# Patient Record
Sex: FEMALE | Race: WHITE | NOT HISPANIC OR LATINO | Employment: FULL TIME | URBAN - METROPOLITAN AREA
[De-identification: names, ages, dates, MRNs, and addresses within clinical notes are randomized per-mention and may not be internally consistent; named-entity substitution may affect disease eponyms.]

---

## 2017-02-06 ENCOUNTER — OFFICE VISIT (OUTPATIENT)
Dept: MEDICAL GROUP | Facility: MEDICAL CENTER | Age: 42
End: 2017-02-06
Payer: COMMERCIAL

## 2017-02-06 VITALS
WEIGHT: 225 LBS | DIASTOLIC BLOOD PRESSURE: 88 MMHG | TEMPERATURE: 98 F | OXYGEN SATURATION: 99 % | RESPIRATION RATE: 12 BRPM | HEART RATE: 75 BPM | SYSTOLIC BLOOD PRESSURE: 122 MMHG | BODY MASS INDEX: 36.16 KG/M2 | HEIGHT: 66 IN

## 2017-02-06 DIAGNOSIS — R51.9 GENERALIZED HEADACHES: ICD-10-CM

## 2017-02-06 DIAGNOSIS — R53.83 OTHER FATIGUE: ICD-10-CM

## 2017-02-06 DIAGNOSIS — G47.09 OTHER INSOMNIA: ICD-10-CM

## 2017-02-06 DIAGNOSIS — M25.50 MULTIPLE JOINT PAIN: ICD-10-CM

## 2017-02-06 PROCEDURE — 99214 OFFICE O/P EST MOD 30 MIN: CPT | Performed by: NURSE PRACTITIONER

## 2017-02-06 RX ORDER — TEMAZEPAM 7.5 MG/1
7.5 CAPSULE ORAL NIGHTLY PRN
Qty: 15 CAP | Refills: 0 | Status: SHIPPED
Start: 2017-02-06 | End: 2017-02-24

## 2017-02-06 NOTE — MR AVS SNAPSHOT
"        Patito Briscoe   2017 8:40 AM   Office Visit   MRN: [id number]    Department:  [address]   Dept Phone:  [phone]    Description:  Female : 1975   Provider:  PITO Borges           Reason for Visit     Insomnia patient states she is having ongoing issues with insomnia       Allergies as of 2017     Allergen Noted Reactions    Doxycycline 2013   Diarrhea, Itching    Lachelle 2011   Diarrhea, Vomiting    Codeine 2011   Rash, Itching    rash    Penicillins 2011   Hives    Ok with omnicef    Sumatriptan 2015   Itching    Vicodin [Hydrocodone-Acetaminophen] 2013   Vomiting      You were diagnosed with     Other fatigue   [0921897]       Generalized headaches   [411206]       Multiple joint pain   [466871]       Other insomnia   [106118]         Vital Signs     Blood Pressure Pulse Temperature Respirations Height Weight    122/88 mmHg 75 36.7 °C (98 °F) 12 1.676 m (5' 6\") 102.059 kg (225 lb)    Body Mass Index Oxygen Saturation Smoking Status             36.33 kg/m2 99% Never Smoker          Basic Information     Date Of Birth Sex Race Ethnicity Preferred Language    1975 Female White Non- English      Problem List              ICD-10-CM Priority Class Noted - Resolved    Asthma, exercise induced J45.990 Low  3/4/2013 - Present    Gastroesophageal reflux disease without esophagitis K21.9   2016 - Present    Multiple joint pain M25.50   2016 - Present    Other fatigue R53.83   2016 - Present    Diarrhea R19.7   2016 - Present    Generalized headaches R51   2017 - Present      Health Maintenance        Date Due Completion Dates    IMM DTaP/Tdap/Td Vaccine (1 - Tdap) 7/15/1996 ---    IMM INFLUENZA (1) 2016 10/1/2014    PAP SMEAR 2019            Current Immunizations     Influenza TIV (IM) 10/1/2014    Pneumococcal Vaccine (PCV7) Historical Data 10/1/2014      Below and/or attached are " the medications your provider expects you to take. Review all of your home medications and newly ordered medications with your provider and/or pharmacist. Follow medication instructions as directed by your provider and/or pharmacist. Please keep your medication list with you and share with your provider. Update the information when medications are discontinued, doses are changed, or new medications (including over-the-counter products) are added; and carry medication information at all times in the event of emergency situations     Allergies:  DOXYCYCLINE - Diarrhea,Itching     GINGER - Diarrhea,Vomiting     CODEINE - Rash,Itching     PENICILLINS - Hives     SUMATRIPTAN - Itching     VICODIN - Vomiting               Medications  Valid as of: February 06, 2017 -  9:12 AM    Generic Name Brand Name Tablet Size Instructions for use    Albuterol Sulfate (Aero Soln) albuterol 108 (90 BASE) MCG/ACT Inhale 1-2 Puffs by mouth every four hours as needed for Shortness of Breath.        Albuterol Sulfate (Nebu Soln) PROVENTIL 2.5mg/3ml 3 mL by Nebulization route every four hours as needed for Shortness of Breath.        Cholecalciferol (Cap) Vitamin D 1000 UNIT Take 1 Cap by mouth every day.        Omeprazole (CAPSULE DELAYED RELEASE) PRILOSEC 20 MG Take 1 Cap by mouth every day.        Phentermine HCl (Tab) ADIPEX-P 37.5 MG TAKE ONE TABLET BY MOUTH IN THE MORNING BEFORE BREAKFAST        Temazepam (Cap) RESTORIL 7.5 MG Take 1 Cap by mouth at bedtime as needed for Sleep.        .                 Medicines prescribed today were sent to:     [facility] [address] [facility] (), [place] - [address]    [address] () [place] [address]    Phone: [phone] Fax: [phone]    Open 24 Hours?: No    [facility] #[id number] - [place], [place] - [address] [facility]    [address] [facility] [place] [address]    Phone: [phone] Fax: [phone]    Open 24 Hours?: No      Medication refill instructions:       If your prescription bottle  indicates you have medication refills left, it is not necessary to call your provider’s office. Please contact your pharmacy and they will refill your medication.    If your prescription bottle indicates you do not have any refills left, you may request refills at any time through one of the following ways: The online Trunk Show system (except Urgent Care), by calling your provider’s office, or by asking your pharmacy to contact your provider’s office with a refill request. Medication refills are processed only during regular business hours and may not be available until the next business day. Your provider may request additional information or to have a follow-up visit with you prior to refilling your medication.   *Please Note: Medication refills are assigned a new Rx number when refilled electronically. Your pharmacy may indicate that no refills were authorized even though a new prescription for the same medication is available at the pharmacy. Please request the medicine by name with the pharmacy before contacting your provider for a refill.        Referral     A referral request has been sent to our patient care coordination department. Please allow 3-5 business days for us to process this request and contact you either by phone or mail. If you do not hear from us by the 5th business day, please call us at [phone].           Trunk Show Access Code: Activation code not generated  Current Trunk Show Status: Active
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
## 2017-02-24 DIAGNOSIS — G47.09 OTHER INSOMNIA: ICD-10-CM

## 2017-02-24 RX ORDER — TEMAZEPAM 15 MG/1
15 CAPSULE ORAL NIGHTLY PRN
Qty: 30 CAP | Refills: 0 | Status: SHIPPED
Start: 2017-02-24 | End: 2017-04-10 | Stop reason: SDUPTHER

## 2017-02-28 DIAGNOSIS — G47.09 OTHER INSOMNIA: ICD-10-CM

## 2017-02-28 RX ORDER — PHENTERMINE HYDROCHLORIDE 37.5 MG/1
TABLET ORAL
Qty: 30 TAB | Refills: 0 | Status: SHIPPED
Start: 2017-02-28 | End: 2017-04-10 | Stop reason: SDUPTHER

## 2017-03-01 NOTE — TELEPHONE ENCOUNTER
Was the patient seen in the last year in this department? Yes  refill 2/6/17    Does patient have an active prescription for medications requested? No     Received Request Via: Pharmacy

## 2017-04-03 ENCOUNTER — HOSPITAL ENCOUNTER (EMERGENCY)
Facility: MEDICAL CENTER | Age: 42
End: 2017-04-03
Attending: EMERGENCY MEDICINE
Payer: COMMERCIAL

## 2017-04-03 ENCOUNTER — APPOINTMENT (OUTPATIENT)
Dept: RADIOLOGY | Facility: MEDICAL CENTER | Age: 42
End: 2017-04-03
Attending: EMERGENCY MEDICINE
Payer: COMMERCIAL

## 2017-04-03 VITALS
HEIGHT: 63 IN | BODY MASS INDEX: 39.65 KG/M2 | OXYGEN SATURATION: 96 % | HEART RATE: 86 BPM | DIASTOLIC BLOOD PRESSURE: 89 MMHG | RESPIRATION RATE: 18 BRPM | WEIGHT: 223.77 LBS | SYSTOLIC BLOOD PRESSURE: 142 MMHG | TEMPERATURE: 97.9 F

## 2017-04-03 DIAGNOSIS — R07.81 PLEURITIC CHEST PAIN: ICD-10-CM

## 2017-04-03 DIAGNOSIS — R07.9 CHEST PAIN, UNSPECIFIED TYPE: ICD-10-CM

## 2017-04-03 LAB
ALBUMIN SERPL BCP-MCNC: 4.8 G/DL (ref 3.2–4.9)
ALBUMIN/GLOB SERPL: 1.3 G/DL
ALP SERPL-CCNC: 63 U/L (ref 30–99)
ALT SERPL-CCNC: 19 U/L (ref 2–50)
ANION GAP SERPL CALC-SCNC: 8 MMOL/L (ref 0–11.9)
APPEARANCE UR: CLEAR
AST SERPL-CCNC: 18 U/L (ref 12–45)
BASOPHILS # BLD AUTO: 0.5 % (ref 0–1.8)
BASOPHILS # BLD: 0.03 K/UL (ref 0–0.12)
BILIRUB SERPL-MCNC: 0.5 MG/DL (ref 0.1–1.5)
BNP SERPL-MCNC: 5 PG/ML (ref 0–100)
BUN SERPL-MCNC: 18 MG/DL (ref 8–22)
CALCIUM SERPL-MCNC: 10 MG/DL (ref 8.5–10.5)
CHLORIDE SERPL-SCNC: 101 MMOL/L (ref 96–112)
CO2 SERPL-SCNC: 25 MMOL/L (ref 20–33)
COLOR UR AUTO: YELLOW
CREAT SERPL-MCNC: 0.72 MG/DL (ref 0.5–1.4)
DEPRECATED D DIMER PPP IA-ACNC: <200 NG/ML(D-DU)
EKG IMPRESSION: NORMAL
EOSINOPHIL # BLD AUTO: 0.27 K/UL (ref 0–0.51)
EOSINOPHIL NFR BLD: 4.4 % (ref 0–6.9)
ERYTHROCYTE [DISTWIDTH] IN BLOOD BY AUTOMATED COUNT: 38.9 FL (ref 35.9–50)
GFR SERPL CREATININE-BSD FRML MDRD: >60 ML/MIN/1.73 M 2
GLOBULIN SER CALC-MCNC: 3.7 G/DL (ref 1.9–3.5)
GLUCOSE SERPL-MCNC: 89 MG/DL (ref 65–99)
GLUCOSE UR QL STRIP.AUTO: NEGATIVE MG/DL
HCG UR QL: NEGATIVE
HCT VFR BLD AUTO: 47 % (ref 37–47)
HGB BLD-MCNC: 15.6 G/DL (ref 12–16)
IMM GRANULOCYTES # BLD AUTO: 0.01 K/UL (ref 0–0.11)
IMM GRANULOCYTES NFR BLD AUTO: 0.2 % (ref 0–0.9)
KETONES UR QL STRIP.AUTO: NEGATIVE MG/DL
LEUKOCYTE ESTERASE UR QL STRIP.AUTO: NEGATIVE
LIPASE SERPL-CCNC: 8 U/L (ref 11–82)
LYMPHOCYTES # BLD AUTO: 1.57 K/UL (ref 1–4.8)
LYMPHOCYTES NFR BLD: 25.7 % (ref 22–41)
MCH RBC QN AUTO: 30 PG (ref 27–33)
MCHC RBC AUTO-ENTMCNC: 33.2 G/DL (ref 33.6–35)
MCV RBC AUTO: 90.4 FL (ref 81.4–97.8)
MONOCYTES # BLD AUTO: 0.29 K/UL (ref 0–0.85)
MONOCYTES NFR BLD AUTO: 4.8 % (ref 0–13.4)
NEUTROPHILS # BLD AUTO: 3.93 K/UL (ref 2–7.15)
NEUTROPHILS NFR BLD: 64.4 % (ref 44–72)
NITRITE UR QL STRIP.AUTO: NEGATIVE
NRBC # BLD AUTO: 0 K/UL
NRBC BLD AUTO-RTO: 0 /100 WBC
PH UR STRIP.AUTO: 6 [PH]
PLATELET # BLD AUTO: 186 K/UL (ref 164–446)
PMV BLD AUTO: 11.2 FL (ref 9–12.9)
POTASSIUM SERPL-SCNC: 3.9 MMOL/L (ref 3.6–5.5)
PROT SERPL-MCNC: 8.5 G/DL (ref 6–8.2)
PROT UR QL STRIP: NEGATIVE MG/DL
RBC # BLD AUTO: 5.2 M/UL (ref 4.2–5.4)
RBC UR QL AUTO: NEGATIVE
SODIUM SERPL-SCNC: 134 MMOL/L (ref 135–145)
SP GR UR: 1.01
TROPONIN I SERPL-MCNC: <0.01 NG/ML (ref 0–0.04)
WBC # BLD AUTO: 6.1 K/UL (ref 4.8–10.8)

## 2017-04-03 PROCEDURE — 36415 COLL VENOUS BLD VENIPUNCTURE: CPT

## 2017-04-03 PROCEDURE — 96374 THER/PROPH/DIAG INJ IV PUSH: CPT

## 2017-04-03 PROCEDURE — 71020 DX-CHEST-2 VIEWS: CPT

## 2017-04-03 PROCEDURE — 84484 ASSAY OF TROPONIN QUANT: CPT

## 2017-04-03 PROCEDURE — 83880 ASSAY OF NATRIURETIC PEPTIDE: CPT

## 2017-04-03 PROCEDURE — 80053 COMPREHEN METABOLIC PANEL: CPT

## 2017-04-03 PROCEDURE — 700101 HCHG RX REV CODE 250: Performed by: EMERGENCY MEDICINE

## 2017-04-03 PROCEDURE — 93005 ELECTROCARDIOGRAM TRACING: CPT

## 2017-04-03 PROCEDURE — 99284 EMERGENCY DEPT VISIT MOD MDM: CPT

## 2017-04-03 PROCEDURE — 94640 AIRWAY INHALATION TREATMENT: CPT

## 2017-04-03 PROCEDURE — 83690 ASSAY OF LIPASE: CPT

## 2017-04-03 PROCEDURE — 85025 COMPLETE CBC W/AUTO DIFF WBC: CPT

## 2017-04-03 PROCEDURE — 81025 URINE PREGNANCY TEST: CPT

## 2017-04-03 PROCEDURE — 85379 FIBRIN DEGRADATION QUANT: CPT

## 2017-04-03 PROCEDURE — 700111 HCHG RX REV CODE 636 W/ 250 OVERRIDE (IP): Performed by: EMERGENCY MEDICINE

## 2017-04-03 PROCEDURE — 81002 URINALYSIS NONAUTO W/O SCOPE: CPT

## 2017-04-03 RX ORDER — OXYCODONE HYDROCHLORIDE AND ACETAMINOPHEN 5; 325 MG/1; MG/1
1-2 TABLET ORAL EVERY 4 HOURS PRN
Qty: 15 TAB | Refills: 0 | Status: SHIPPED | OUTPATIENT
Start: 2017-04-03 | End: 2019-07-08

## 2017-04-03 RX ORDER — IPRATROPIUM BROMIDE AND ALBUTEROL SULFATE 2.5; .5 MG/3ML; MG/3ML
3 SOLUTION RESPIRATORY (INHALATION)
Status: COMPLETED | OUTPATIENT
Start: 2017-04-03 | End: 2017-04-03

## 2017-04-03 RX ORDER — KETOROLAC TROMETHAMINE 30 MG/ML
30 INJECTION, SOLUTION INTRAMUSCULAR; INTRAVENOUS ONCE
Status: COMPLETED | OUTPATIENT
Start: 2017-04-03 | End: 2017-04-03

## 2017-04-03 RX ADMIN — IPRATROPIUM BROMIDE AND ALBUTEROL SULFATE 3 ML: .5; 3 SOLUTION RESPIRATORY (INHALATION) at 11:15

## 2017-04-03 RX ADMIN — KETOROLAC TROMETHAMINE 30 MG: 30 INJECTION, SOLUTION INTRAMUSCULAR; INTRAVENOUS at 12:22

## 2017-04-03 ASSESSMENT — COPD QUESTIONNAIRES
HAVE YOU SMOKED AT LEAST 100 CIGARETTES IN YOUR ENTIRE LIFE: YES
DURING THE PAST 4 WEEKS HOW MUCH DID YOU FEEL SHORT OF BREATH: NONE/LITTLE OF THE TIME
DO YOU EVER COUGH UP ANY MUCUS OR PHLEGM?: NO/ONLY WITH OCCASIONAL COLDS OR INFECTIONS
COPD SCREENING SCORE: 2

## 2017-04-03 ASSESSMENT — PAIN SCALES - GENERAL: PAINLEVEL_OUTOF10: 5

## 2017-04-03 ASSESSMENT — LIFESTYLE VARIABLES: EVER_SMOKED: YES

## 2017-04-03 NOTE — ED PROVIDER NOTES
ED Provider Note    CHIEF COMPLAINT  Chief Complaint   Patient presents with   • Chest Pain     Pt BIB self to triage for c/o sharp/stabbing pain for two days. pt reports pain in left chest/ radiates to back/shoulder and up neck.        HPI  Patito Briscoe is a 41 y.o. female who presents to the emergency department playing and chest pain. She states that she has left-sided chest pain and increase in severity over the last 48 hours. The pain, and suddenly when she was laughing watching a video. The pain is sharp in nature, radiates to her back and left arm, no alleviating or exacerbating factors. She's had a slight cough and shortness of breath and feels pressure in the left side of her chest wall. She denies fever, shortness of breath, hemoptysis, hematochezia, melena, hematemesis. The patient denies swelling of her lower extremities, abdominal pain, nausea, vomiting. She states that she has not had a period in many years secondary to ablation.    Cardiac Risk Factors:  No Age > 65  No Aspirin use within 7 days  No prior history of coronary artery disease  No diabetes  No hyperlipidemia   No hypertension  No obesity  No family history of coronary artery disease at a young age <54 yo  No tobacco use   No drugs (methamphetamine or cocaine)  No history of aortic aneurysm   No history of aortic dissection   No history of deep vein thrombosis or pulmonary embolism   No hormone replacement  No oral birth control      REVIEW OF SYSTEMS  Positives as above. Pertinent negatives include shortness of breath, All other review of systems are negative    PAST MEDICAL HISTORY  Past Medical History   Diagnosis Date   • Asthma, exercise induced 3/4/2013       FAMILY HISTORY  Noncontributory    SOCIAL HISTORY  Social History     Social History   • Marital Status:      Spouse Name: N/A   • Number of Children: N/A   • Years of Education: N/A     Social History Main Topics   • Smoking status: Never Smoker    • Smokeless  "tobacco: Never Used   • Alcohol Use: Yes      Comment: occasionally   • Drug Use: No   • Sexual Activity:     Partners: Male     Birth Control/ Protection: Surgical     Other Topics Concern   • Not on file     Social History Narrative       SURGICAL HISTORY  Past Surgical History   Procedure Laterality Date   • Tonsillectomy     • Clavicle orif     • Endometrial ablation       2009       CURRENT MEDICATIONS  Home Medications     **Home medications have not yet been reviewed for this encounter**          ALLERGIES  Allergies   Allergen Reactions   • Doxycycline Diarrhea and Itching   • Lachelle Diarrhea and Vomiting   • Codeine Rash and Itching     rash   • Penicillins Hives     Ok with omnicef   • Sumatriptan Itching   • Vicodin [Hydrocodone-Acetaminophen] Vomiting       PHYSICAL EXAM  VITAL SIGNS: /89 mmHg  Pulse 86  Temp(Src) 36.6 °C (97.9 °F) (Temporal)  Resp 18  Ht 1.6 m (5' 2.99\")  Wt 101.5 kg (223 lb 12.3 oz)  BMI 39.65 kg/m2  SpO2 96%     Constitutional: Well developed, Well nourished, No acute distress, Non-toxic appearance.   Eyes: PERRLA, EOMI, Conjunctiva normal, No discharge.   Cardiovascular: Normal heart rate, Normal rhythm, No murmurs, No rubs, No gallops, and intact distal pulses.   Thorax & Lungs: Slight wheezing and rhonchi left chest wall No respiratory distress,  No chest wall tenderness.   Abdomen: Bowel sounds normal, Soft, No tenderness, No guarding, No rebound, No pulsatile masses.   Skin: Warm, Dry, No erythema, No rash.   Extremities: Full range of motion, no deformity, no edema, negative Homans sign.  Neurologic: Alert & oriented x 3, No focal deficits noted, acting appropriately on exam.  Psychiatric: Affect normal for clinical presentation.    Results for orders placed or performed during the hospital encounter of 04/03/17   CBC w/ Differential   Result Value Ref Range    WBC 6.1 4.8 - 10.8 K/uL    RBC 5.20 4.20 - 5.40 M/uL    Hemoglobin 15.6 12.0 - 16.0 g/dL    Hematocrit " 47.0 37.0 - 47.0 %    MCV 90.4 81.4 - 97.8 fL    MCH 30.0 27.0 - 33.0 pg    MCHC 33.2 (L) 33.6 - 35.0 g/dL    RDW 38.9 35.9 - 50.0 fL    Platelet Count 186 164 - 446 K/uL    MPV 11.2 9.0 - 12.9 fL    Neutrophils-Polys 64.40 44.00 - 72.00 %    Lymphocytes 25.70 22.00 - 41.00 %    Monocytes 4.80 0.00 - 13.40 %    Eosinophils 4.40 0.00 - 6.90 %    Basophils 0.50 0.00 - 1.80 %    Immature Granulocytes 0.20 0.00 - 0.90 %    Nucleated RBC 0.00 /100 WBC    Neutrophils (Absolute) 3.93 2.00 - 7.15 K/uL    Lymphs (Absolute) 1.57 1.00 - 4.80 K/uL    Monos (Absolute) 0.29 0.00 - 0.85 K/uL    Eos (Absolute) 0.27 0.00 - 0.51 K/uL    Baso (Absolute) 0.03 0.00 - 0.12 K/uL    Immature Granulocytes (abs) 0.01 0.00 - 0.11 K/uL    NRBC (Absolute) 0.00 K/uL   Complete Metabolic Panel (CMP)   Result Value Ref Range    Sodium 134 (L) 135 - 145 mmol/L    Potassium 3.9 3.6 - 5.5 mmol/L    Chloride 101 96 - 112 mmol/L    Co2 25 20 - 33 mmol/L    Anion Gap 8.0 0.0 - 11.9    Glucose 89 65 - 99 mg/dL    Bun 18 8 - 22 mg/dL    Creatinine 0.72 0.50 - 1.40 mg/dL    Calcium 10.0 8.5 - 10.5 mg/dL    AST(SGOT) 18 12 - 45 U/L    ALT(SGPT) 19 2 - 50 U/L    Alkaline Phosphatase 63 30 - 99 U/L    Total Bilirubin 0.5 0.1 - 1.5 mg/dL    Albumin 4.8 3.2 - 4.9 g/dL    Total Protein 8.5 (H) 6.0 - 8.2 g/dL    Globulin 3.7 (H) 1.9 - 3.5 g/dL    A-G Ratio 1.3 g/dL   Lipase   Result Value Ref Range    Lipase 8 (L) 11 - 82 U/L   Troponin STAT   Result Value Ref Range    Troponin I <0.01 0.00 - 0.04 ng/mL   Btype Natriuretic Peptide   Result Value Ref Range    B Natriuretic Peptide 5 0 - 100 pg/mL   D-Dimer (only helpful in low pre-test probability wells critieria. Do not order if patient ruled out by PERC criteria. See Weblinks at top of Labs section)   Result Value Ref Range    D-Dimer Screen <200 <250 ng/mL(D-DU)   ESTIMATED GFR   Result Value Ref Range    GFR If African American >60 >60 mL/min/1.73 m 2    GFR If Non African American >60 >60 mL/min/1.73 m 2    POC UA   Result Value Ref Range    POC Color Yellow     POC Appearance Clear     POC Glucose Negative Negative mg/dL    POC Ketones Negative Negative mg/dL    POC Specific Gravity 1.010 1.005-1.030    POC Blood Negative Negative    POC Urine PH 6.0 5.0-8.0    POC Protein Negative Negative mg/dL    POC Nitrites Negative Negative    POC Leukocyte Esterase Negative Negative   POC URINE PREGNANCY   Result Value Ref Range    POC Urine Pregnancy Test Negative Negative   EKG (NOW)   Result Value Ref Range    Report       [facility] Emergency Dept.    Test Date:  2017  Pt Name:    VELASQUEZ DUFF              Department: ER  MRN:        [id number]                      Room:  Gender:     F                            Technician: [id number]  :        1975                   Requested By:ER TRIAGE PROTOCOL  Order #:    [id number]                    Reading MD: LANE REDDY DO    Measurements  Intervals                                Axis  Rate:       77                           P:          37  CA:         164                          QRS:        13  QRSD:       80                           T:          -8  QT:         404  QTc:        458    Interpretive Statements  SINUS RHYTHM  BORDERLINE T ABNORMALITIES, INFERIOR LEADS  No previous ECG available for comparison    Electronically Signed On 4-3-2017 10:48:41 PDT by LANE REDDY DO       EKG reviewed above  RADIOLOGY/PROCEDURES  DX-CHEST-2 VIEWS   Final Result      No acute cardiopulmonary disease.          COURSE & MEDICAL DECISION MAKING  Pertinent Labs & Imaging studies reviewed. (See chart for details)  This is a charming 41-year-old female with pleuritic chest pain. She has no cardiac risk factors, negative EKG, troponin negative his symptoms greater than 24 hours. I do not believe she is experiencing acute coronary syndrome, myocardial infarction. In addition, the patient has a negative d-dimer and I do not believe the patient  is experiencing a pulmonary embolism, she is not hypoxic, tachypneic or tachycardic. The patient probably is experiencing pleurisy. I have given her Toradol IV in the emergency department with significant improvement of symptoms. I instructed the patient to return to the emergency department if she has increasing symptomatology and to follow-up with her primary care physician.    Discharge Medication List as of 4/3/2017 12:56 PM      START taking these medications    Details   oxycodone-acetaminophen (PERCOCET) 5-325 MG Tab Take 1-2 Tabs by mouth every four hours as needed., Disp-15 Tab, R-0, Print Rx Paper             FINAL IMPRESSION     1. Chest pain, unspecified type    2. Pleuritic chest pain        I reviewed prescription monitoring program for patient's narcotic use before prescribing a scheduled drug.The patient will not drink alcohol nor drive with prescribed medications.The patient will return for new or worsening symptoms and is stable at the time of discharge.    The patient is referred to a primary physician for blood pressure management, diabetic screening, and for all other preventative health concerns.    DISPOSITION:  Patient will be discharged home in stable condition.    FOLLOW UP:  [facility], Emergency Dept  [address] [facility] [address] [phone]    If symptoms worsen    Jessica Schroeder, A.P.N.  [address] [address] [address] [phone]    Schedule an appointment as soon as possible for a visit in 1 week        OUTPATIENT MEDICATIONS:  Discharge Medication List as of 4/3/2017 12:56 PM      START taking these medications    Details   oxycodone-acetaminophen (PERCOCET) 5-325 MG Tab Take 1-2 Tabs by mouth every four hours as needed., Disp-15 Tab, R-0, Print Rx Paper                    Electronically signed by: Leonardo Toth, 4/3/2017 11:24 AM

## 2017-04-03 NOTE — ED AVS SNAPSHOT
K121 Access Code: Activation code not generated  Current K121 Status: Active    [employer]  A secure, online tool to manage your health information     [employer]’s K121® is a secure, online tool that connects you to your personalized health information from the privacy of your home -- day or night - making it very easy for you to manage your healthcare. Once the activation process is completed, you can even access your medical information using the K121 rolly, which is available for free in the Apple Rolly store or Google Play store.     K121 provides the following levels of access (as shown below):   My Chart Features   [facility] Primary Care Doctor [facility]  Specialists [facility]  Urgent  Care Non-[facility]  Primary Care  Doctor   Email your healthcare team securely and privately 24/7 X X X X   Manage appointments: schedule your next appointment; view details of past/upcoming appointments X      Request prescription refills. X      View recent personal medical records, including lab and immunizations X X X X   View health record, including health history, allergies, medications X X X X   Read reports about your outpatient visits, procedures, consult and ER notes X X X X   See your discharge summary, which is a recap of your hospital and/or ER visit that includes your diagnosis, lab results, and care plan. X X       How to register for K121:  1. Go to  [email or web address].  2. Click on the Sign Up Now box, which takes you to the New Member Sign Up page. You will need to provide the following information:  a. Enter your K121 Access Code exactly as it appears at the top of this page. (You will not need to use this code after you’ve completed the sign-up process. If you do not sign up before the expiration date, you must request a new code.)   b. Enter your date of birth.   c. Enter your home email address.   d. Click Submit, and follow the next screen’s instructions.  3. Create a K121 ID. This will  be your [employer] login ID and cannot be changed, so think of one that is secure and easy to remember.  4. Create a [employer] password. You can change your password at any time.  5. Enter your Password Reset Question and Answer. This can be used at a later time if you forget your password.   6. Enter your e-mail address. This allows you to receive e-mail notifications when new information is available in [employer].  7. Click Sign Up. You can now view your health information.    For assistance activating your [employer] account, call [phone]

## 2017-04-03 NOTE — ED NOTES
Chief Complaint   Patient presents with   • Chest Pain     Pt BIB self to triage for c/o sharp/stabbing pain for two days. pt reports pain in left chest/ radiates to back/shoulder and up neck.      Explained to pt triage process, made pt aware to tell this RN of any changes/concerns, pt verbalized understanding of process and instructions given. Pt to ER lobby.

## 2017-04-03 NOTE — DISCHARGE INSTRUCTIONS
Chest Wall Pain  Chest wall pain is pain in or around the bones and muscles of your chest. It may take up to 6 weeks to get better. It may take longer if you must stay physically active in your work and activities.   CAUSES   Chest wall pain may happen on its own. However, it may be caused by:  · A viral illness like the flu.  · Injury.  · Coughing.  · Exercise.  · Arthritis.  · Fibromyalgia.  · Shingles.  HOME CARE INSTRUCTIONS   · Avoid overtiring physical activity. Try not to strain or perform activities that cause pain. This includes any activities using your chest or your abdominal and side muscles, especially if heavy weights are used.  · Put ice on the sore area.  · Put ice in a plastic bag.  · Place a towel between your skin and the bag.  · Leave the ice on for 15-20 minutes per hour while awake for the first 2 days.  · Only take over-the-counter or prescription medicines for pain, discomfort, or fever as directed by your caregiver.  SEEK IMMEDIATE MEDICAL CARE IF:   · Your pain increases, or you are very uncomfortable.  · You have a fever.  · Your chest pain becomes worse.  · You have new, unexplained symptoms.  · You have nausea or vomiting.  · You feel sweaty or lightheaded.  · You have a cough with phlegm (sputum), or you cough up blood.  MAKE SURE YOU:   · Understand these instructions.  · Will watch your condition.  · Will get help right away if you are not doing well or get worse.     This information is not intended to replace advice given to you by your health care provider. Make sure you discuss any questions you have with your health care provider.     Document Released: 12/18/2006 Document Revised: 03/11/2013 Document Reviewed: 03/14/2016  [employer] Interactive Patient Education ©2016 [employer]  Pleurisy  Pleurisy is an inflammation and swelling of the lining of the lungs (pleura). Because of this inflammation, it hurts to breathe. It can be aggravated by coughing, laughing, or deep breathing.  Pleurisy is often caused by an underlying infection or disease.   HOME CARE INSTRUCTIONS   Monitor your pleurisy for any changes. The following actions may help to alleviate any discomfort you are experiencing:  · Medicine may help with pain. Only take over-the-counter or prescription medicines for pain, discomfort, or fever as directed by your health care provider.  · Only take antibiotic medicine as directed. Make sure to finish it even if you start to feel better.  SEEK MEDICAL CARE IF:   · Your pain is not controlled with medicine or is increasing.  · You have an increase in pus-like (purulent) secretions brought up with coughing.  SEEK IMMEDIATE MEDICAL CARE IF:   · You have blue or dark lips, fingernails, or toenails.  · You are coughing up blood.  · You have increased difficulty breathing.  · You have continuing pain unrelieved by medicine or pain lasting more than 1 week.  · You have pain that radiates into your neck, arms, or jaw.  · You develop increased shortness of breath or wheezing.  · You develop a fever, rash, vomiting, fainting, or other serious symptoms.  MAKE SURE YOU:  · Understand these instructions.    · Will watch your condition.    · Will get help right away if you are not doing well or get worse.        This information is not intended to replace advice given to you by your health care provider. Make sure you discuss any questions you have with your health care provider.     Document Released: 12/18/2006 Document Revised: 08/20/2014 Document Reviewed: 06/01/2014  [employer] Interactive Patient Education ©2016 [employer]

## 2017-04-03 NOTE — ED AVS SNAPSHOT
Home Care Instructions                                                                                                                Patito Briscoe   MRN: [id number]    Department:  [facility], Emergency Dept   Date of Visit:  4/3/2017            [facility] Emergency Dept    [address] [place] [address]    Phone:  [phone]      You were seen by     Leonardo Toth D.O.      Your Diagnosis Was     Chest pain, unspecified type     R07.9       These are the medications you received during your hospitalization from 04/03/2017 0937 to 04/03/2017 1256     Date/Time Order Dose Route Action    04/03/2017 1115 ipratropium-albuterol (DUONEB) nebulizer solution 3 mL 3 mL Nebulization Given    04/03/2017 1222 ketorolac (TORADOL) injection 30 mg 30 mg Intravenous Given      Follow-up Information     1. Follow up with [facility] Emergency [facility].    Specialty:  Emergency Medicine    Why:  If symptoms worsen    Contact information    [address] [place] [id number] [phone]        2. Follow up with [facility]. Schedule an appointment as soon as possible for a visit in 1 week.    Specialty:  Family Medicine    Contact information    [address] [facility] [address]  [facility] [address] [phone]        Medication Information     Review all of your home medications and newly ordered medications with your primary doctor and/or pharmacist as soon as possible. Follow medication instructions as directed by your doctor and/or pharmacist.     Please keep your complete medication list with you and share with your physician. Update the information when medications are discontinued, doses are changed, or new medications (including over-the-counter products) are added; and carry medication information at all times in the event of emergency situations.               Medication List      START taking these medications        Instructions    Morning Afternoon Evening Bedtime    oxycodone-acetaminophen 5-325 MG Tabs   Commonly known as:  PERCOCET        Take 1-2 Tabs by mouth every four hours as needed.   Dose:  1-2 Tab                          ASK your doctor about these medications        Instructions    Morning Afternoon Evening Bedtime    * albuterol 108 (90 BASE) MCG/ACT Aers inhalation aerosol        Doctor's comments:  May fill as cheapest albuterol possible.   Inhale 1-2 Puffs by mouth every four hours as needed for Shortness of Breath.   Dose:  1-2 Puff                        * albuterol 2.5mg/3ml Nebu solution for nebulization   Commonly known as:  PROVENTIL        3 mL by Nebulization route every four hours as needed for Shortness of Breath.   Dose:  2.5 mg                        omeprazole 20 MG delayed-release capsule   Commonly known as:  PRILOSEC        Take 1 Cap by mouth every day.   Dose:  20 mg                        phentermine 37.5 MG tablet   Commonly known as:  ADIPEX-P        TAKE ONE TABLET BY MOUTH ONCE DAILY IN THE MORNING BEFORE  BREAKFAST                        temazepam 15 MG Caps   Commonly known as:  RESTORIL        Take 1 Cap by mouth at bedtime as needed for Sleep.   Dose:  15 mg                        Vitamin D 1000 UNIT Caps        Take 1 Cap by mouth every day.   Dose:  1 Cap                        * Notice:  This list has 2 medication(s) that are the same as other medications prescribed for you. Read the directions carefully, and ask your doctor or other care provider to review them with you.         Where to Get Your Medications      You can get these medications from any pharmacy     Bring a paper prescription for each of these medications    - oxycodone-acetaminophen 5-325 MG Tabs            Procedures and tests performed during your visit     Btype Natriuretic Peptide    CBC w/ Differential    Complete Metabolic Panel (CMP)    D-Dimer (only helpful in low pre-test probability wells critieria. Do  not order if patient ruled out by PERC criteria. See Weblinks at top of Labs section)    DX-CHEST-2 VIEWS    EKG (NOW)    ESTIMATED GFR    IV Saline Lock    Lipase    OLD EKG    POC UA    POC URINE PREGNANCY    Troponin STAT        Discharge Instructions       Chest Wall Pain  Chest wall pain is pain in or around the bones and muscles of your chest. It may take up to 6 weeks to get better. It may take longer if you must stay physically active in your work and activities.   CAUSES   Chest wall pain may happen on its own. However, it may be caused by:  · A viral illness like the flu.  · Injury.  · Coughing.  · Exercise.  · Arthritis.  · Fibromyalgia.  · Shingles.  HOME CARE INSTRUCTIONS   · Avoid overtiring physical activity. Try not to strain or perform activities that cause pain. This includes any activities using your chest or your abdominal and side muscles, especially if heavy weights are used.  · Put ice on the sore area.  · Put ice in a plastic bag.  · Place a towel between your skin and the bag.  · Leave the ice on for 15-20 minutes per hour while awake for the first 2 days.  · Only take over-the-counter or prescription medicines for pain, discomfort, or fever as directed by your caregiver.  SEEK IMMEDIATE MEDICAL CARE IF:   · Your pain increases, or you are very uncomfortable.  · You have a fever.  · Your chest pain becomes worse.  · You have new, unexplained symptoms.  · You have nausea or vomiting.  · You feel sweaty or lightheaded.  · You have a cough with phlegm (sputum), or you cough up blood.  MAKE SURE YOU:   · Understand these instructions.  · Will watch your condition.  · Will get help right away if you are not doing well or get worse.     This information is not intended to replace advice given to you by your health care provider. Make sure you discuss any questions you have with your health care provider.     Document Released: 12/18/2006 Document Revised: 03/11/2013 Document Reviewed:  03/14/2016  [employer] Interactive Patient Education ©2016 [employer].  Pleurisy  Pleurisy is an inflammation and swelling of the lining of the lungs (pleura). Because of this inflammation, it hurts to breathe. It can be aggravated by coughing, laughing, or deep breathing. Pleurisy is often caused by an underlying infection or disease.   HOME CARE INSTRUCTIONS   Monitor your pleurisy for any changes. The following actions may help to alleviate any discomfort you are experiencing:  · Medicine may help with pain. Only take over-the-counter or prescription medicines for pain, discomfort, or fever as directed by your health care provider.  · Only take antibiotic medicine as directed. Make sure to finish it even if you start to feel better.  SEEK MEDICAL CARE IF:   · Your pain is not controlled with medicine or is increasing.  · You have an increase in pus-like (purulent) secretions brought up with coughing.  SEEK IMMEDIATE MEDICAL CARE IF:   · You have blue or dark lips, fingernails, or toenails.  · You are coughing up blood.  · You have increased difficulty breathing.  · You have continuing pain unrelieved by medicine or pain lasting more than 1 week.  · You have pain that radiates into your neck, arms, or jaw.  · You develop increased shortness of breath or wheezing.  · You develop a fever, rash, vomiting, fainting, or other serious symptoms.  MAKE SURE YOU:  · Understand these instructions.    · Will watch your condition.    · Will get help right away if you are not doing well or get worse.        This information is not intended to replace advice given to you by your health care provider. Make sure you discuss any questions you have with your health care provider.     Document Released: 12/18/2006 Document Revised: 08/20/2014 Document Reviewed: 06/01/2014  [employer] Interactive Patient Education ©2016 [employer].            Patient Information     Patient Information    Following emergency treatment: all patient  requiring follow-up care must return either to a private physician or a clinic if your condition worsens before you are able to obtain further medical attention, please return to the emergency room.     Billing Information    At [facility], we work to make the billing process streamlined for our patients.  Our Representatives are here to answer any questions you may have regarding your hospital bill.  If you have insurance coverage and have supplied your insurance information to us, we will submit a claim to your insurer on your behalf.  Should you have any questions regarding your bill, we can be reached online or by phone as follows:  Online: You are able pay your bills online or live chat with our representatives about any billing questions you may have. We are here to help Monday - Friday from 8:00am to 7:30pm and 9:00am - 12:00pm on Saturdays.  Please visit [email or web address]  for more information.   Phone:  [phone] or [phone]    Please note that your emergency physician, surgeon, pathologist, radiologist, anesthesiologist, and other specialists are not employed by [facility] and will therefore bill separately for their services.  Please contact them directly for any questions concerning their bills at the numbers below:     Emergency Physician Services:  [phone]  [facility]:  [phone]  [facility]:  [phone]  [facility]:  [phone]    1. Your final bill may vary from the amount quoted upon discharge if all procedures are not complete at that time, or if your doctor has additional procedures of which we are not aware. You will receive an additional bill if you return to the Emergency Department at [facility] for suture removal regardless of the facility of which the sutures were placed.     2. Please arrange for settlement of this account at the emergency registration.    3. All self-pay accounts  are due in full at the time of treatment.  If you are unable to meet this obligation then payment is expected within 4-5 days.     4. If you have had radiology studies (CT, X-ray, Ultrasound, MRI), you have received a preliminary result during your emergency department visit. Please contact the radiology department [phone] to receive a copy of your final result. Please discuss the Final result with your primary physician or with the follow up physician provided.     Crisis Hotline:  [place] Crisis Hotline:  0-740-HJYGMHQ or 1-849.348.2262  [place] Crisis Hotline:    1-840.603.8719 or 488-620-2303         ED Discharge Follow Up Questions    1. In order to provide you with very good care, we would like to follow up with a phone call in the next few days.  May we have your permission to contact you?     YES /  NO    2. What is the best phone number to call you? (       )_____-__________    3. What is the best time to call you?      Morning  /  Afternoon  /  Evening                   Patient Signature:  ____________________________________________________________    Date:  ____________________________________________________________

## 2017-04-03 NOTE — ED NOTES
Pt given discharge instructions and Rx x 1. Pt verbalized understanding. VSS no acute distress noted, pt able to ambulate without difficulty. Pt home with

## 2017-04-03 NOTE — FLOWSHEET NOTE
04/03/17 1118   Interdisciplinary Plan of Care-Goals (Indications)   Obstructive Ventilatory Defect or Pulmonary Disease without Obvious Obstruction History / Diagnosis   Interdisciplinary Plan of Care-Outcomes    Bronchodilator Outcome Patient at Stable Baseline   Education   Education Yes - Pt. / Family has been Instructed in use of Respiratory Medications and Adverse Reactions   RT Assessment of Delivered Medications   Evaluation of Medication Delivery Daily Yes-- Pt /Family has been Instructed in use of Respiratory Medications and Adverse Reactions   SVN Group   #SVN Performed Yes   Given By: Mouthpiece   Date SVN Last Changed 04/03/17   Date SVN Next Change Due (Q 7 Days) 04/10/17   Respiratory WDL   Respiratory (WDL) X   Chest Exam   Respiration 18   Pulse 79   Heart Rate (Monitored) 79   Breath Sounds   Pre/Post Intervention Pre Intervention Assessment   RUL Breath Sounds Clear   RML Breath Sounds Clear   RLL Breath Sounds Diminished   LANNY Breath Sounds Clear   LLL Breath Sounds Diminished   Oximetry   Continuous Oximetry Yes   O2 Alarms Set & Reviewed Yes   Oxygen   Home O2 Use Prior To Admission? No   Pulse Oximetry 100 %   O2 (LPM) 0   O2 (FiO2) 21   O2 Daily Delivery Respiratory  Room Air with O2 Available

## 2017-04-03 NOTE — ED AVS SNAPSHOT
4/3/2017          Patito Briscoe  [address]   [place] [address]    Dear Patito:    [facility] wants to ensure your discharge home is safe and you or your loved ones have had all your questions answered regarding your care after you leave the hospital.    You may receive a telephone call within two days of your discharge.  This call is to make certain you understand your discharge instructions as well as ensure we provided you with the best care possible during your stay with us.     The call will only last approximately 3-5 minutes and will be done by a nurse.    Once again, we want to ensure your discharge home is safe and that you have a clear understanding of any next steps in your care.  If you have any questions or concerns, please do not hesitate to contact us, we are here for you.  Thank you for choosing [facility] for your healthcare needs.    Sincerely,    Shravan Gomes    [facility]

## 2017-04-11 DIAGNOSIS — G47.09 OTHER INSOMNIA: ICD-10-CM

## 2017-04-11 RX ORDER — TEMAZEPAM 15 MG/1
15 CAPSULE ORAL NIGHTLY PRN
Qty: 30 CAP | Refills: 0 | Status: SHIPPED
Start: 2017-04-11 | End: 2019-12-24

## 2017-04-11 RX ORDER — PHENTERMINE HYDROCHLORIDE 37.5 MG/1
TABLET ORAL
Qty: 30 TAB | Refills: 0 | Status: SHIPPED
Start: 2017-04-11 | End: 2017-07-10 | Stop reason: SDUPTHER

## 2017-04-17 RX ORDER — TEMAZEPAM 15 MG/1
CAPSULE ORAL
Qty: 30 CAP | Refills: 0 | Status: SHIPPED
Start: 2017-04-17 | End: 2017-05-22 | Stop reason: SDUPTHER

## 2017-04-17 NOTE — TELEPHONE ENCOUNTER
Prescription faxed to:    [id number]  [place] (), [place] - [address]  [address] [place] [address]  [place] () [place] [address]  Phone: [phone] Fax: [phone]

## 2017-04-17 NOTE — TELEPHONE ENCOUNTER
Was the patient seen in the last year in this department? Yes     Does patient have an active prescription for medications requested? No     Received Request Via: Pharmacy     Per  last fill: 3-10-17 # 30

## 2017-05-22 RX ORDER — PHENTERMINE HYDROCHLORIDE 37.5 MG/1
TABLET ORAL
Qty: 30 TAB | Refills: 0 | Status: SHIPPED
Start: 2017-05-22 | End: 2018-05-21 | Stop reason: SDUPTHER

## 2017-05-22 RX ORDER — TEMAZEPAM 15 MG/1
15 CAPSULE ORAL NIGHTLY PRN
Qty: 30 CAP | Refills: 0 | Status: SHIPPED
Start: 2017-05-22 | End: 2019-07-08 | Stop reason: SDUPTHER

## 2017-05-22 RX ORDER — OMEPRAZOLE 20 MG/1
CAPSULE, DELAYED RELEASE ORAL
Qty: 30 CAP | Refills: 0 | Status: SHIPPED | OUTPATIENT
Start: 2017-05-22 | End: 2017-07-03 | Stop reason: SDUPTHER

## 2017-05-22 NOTE — TELEPHONE ENCOUNTER
Was the patient seen in the last year in this department? Yes     Does patient have an active prescription for medications requested? No     Received Request Via: Pharmacy     Per  last fill:   Restoril: 4-17-17 # 30  Phentermine: 4-13-17 # 30

## 2017-05-22 NOTE — TELEPHONE ENCOUNTER
Prescription faxed to:    [id number]  [place] (), [place] - [address]  [address] [place] [address]  [place] () [place] [address]  Phone: [phone] Fax: [phone]

## 2017-07-03 RX ORDER — OMEPRAZOLE 20 MG/1
CAPSULE, DELAYED RELEASE ORAL
Qty: 30 CAP | Refills: 0 | Status: SHIPPED | OUTPATIENT
Start: 2017-07-03

## 2017-07-10 RX ORDER — PHENTERMINE HYDROCHLORIDE 37.5 MG/1
TABLET ORAL
Qty: 30 TAB | Refills: 0 | Status: SHIPPED
Start: 2017-07-10 | End: 2017-12-18 | Stop reason: SDUPTHER

## 2017-07-10 NOTE — TELEPHONE ENCOUNTER
Was the patient seen in the last year in this department? Yes     Does patient have an active prescription for medications requested? No     Received Request Via: Pharmacy     Last visit:2/6/17    Last  fill:5/22/17

## 2018-01-15 ENCOUNTER — APPOINTMENT (OUTPATIENT)
Dept: MEDICAL GROUP | Facility: LAB | Age: 43
End: 2018-01-15

## 2018-02-21 RX ORDER — PHENTERMINE HYDROCHLORIDE 37.5 MG/1
TABLET ORAL
Qty: 30 TAB | Refills: 0
Start: 2018-02-21 | End: 2018-03-23

## 2018-02-21 NOTE — TELEPHONE ENCOUNTER
Was the patient seen in the last year in this department? No LOV: 2-6-17    Does patient have an active prescription for medications requested? No     Received Request Via: Pharmacy     Per  last fill: 12-19-17 # 30

## 2018-05-21 DIAGNOSIS — E66.9 CLASS 2 OBESITY WITHOUT SERIOUS COMORBIDITY IN ADULT, UNSPECIFIED BMI, UNSPECIFIED OBESITY TYPE: ICD-10-CM

## 2018-05-21 PROBLEM — E66.812 CLASS 2 OBESITY WITHOUT SERIOUS COMORBIDITY IN ADULT: Status: ACTIVE | Noted: 2018-05-21

## 2018-05-21 RX ORDER — PHENTERMINE HYDROCHLORIDE 37.5 MG/1
TABLET ORAL
Qty: 30 TAB | Refills: 2 | Status: SHIPPED
Start: 2018-05-21 | End: 2018-06-18

## 2018-07-02 ENCOUNTER — OFFICE VISIT (OUTPATIENT)
Dept: URGENT CARE | Facility: PHYSICIAN GROUP | Age: 43
End: 2018-07-02

## 2018-07-02 VITALS
DIASTOLIC BLOOD PRESSURE: 78 MMHG | RESPIRATION RATE: 18 BRPM | SYSTOLIC BLOOD PRESSURE: 122 MMHG | BODY MASS INDEX: 31.89 KG/M2 | WEIGHT: 180 LBS | TEMPERATURE: 98.7 F | HEART RATE: 74 BPM | OXYGEN SATURATION: 97 % | HEIGHT: 63 IN

## 2018-07-02 DIAGNOSIS — J45.21 MILD INTERMITTENT ASTHMA WITH EXACERBATION: ICD-10-CM

## 2018-07-02 DIAGNOSIS — J06.9 ACUTE URI: ICD-10-CM

## 2018-07-02 PROCEDURE — 99214 OFFICE O/P EST MOD 30 MIN: CPT | Performed by: PHYSICIAN ASSISTANT

## 2018-07-02 RX ORDER — PREDNISONE 20 MG/1
TABLET ORAL
Qty: 10 TAB | Refills: 0 | Status: SHIPPED | OUTPATIENT
Start: 2018-07-02 | End: 2019-07-08

## 2018-07-02 RX ORDER — METHYLPREDNISOLONE 4 MG/1
TABLET ORAL
Qty: 1 KIT | Refills: 0 | Status: SHIPPED | OUTPATIENT
Start: 2018-07-02 | End: 2019-07-08

## 2018-07-03 ASSESSMENT — ENCOUNTER SYMPTOMS
SHORTNESS OF BREATH: 0
SPUTUM PRODUCTION: 0
WHEEZING: 1
HEMOPTYSIS: 0
FEVER: 0
CHEST TIGHTNESS: 1
SORE THROAT: 0
CHILLS: 0
COUGH: 1
PALPITATIONS: 0

## 2018-07-03 NOTE — PROGRESS NOTES
Subjective:      Patito Briscoe is a 42 y.o. female who presents with Cough (congestion, sinus x 2 day)            Asthma   She complains of chest tightness, cough and wheezing. There is no hemoptysis, shortness of breath or sputum production. This is a new problem. The current episode started in the past 7 days. The problem occurs constantly. The problem has been unchanged. The cough is non-productive. Pertinent negatives include no chest pain, ear pain, fever, malaise/fatigue or sore throat. Her symptoms are aggravated by lying down. Her symptoms are not alleviated by beta-agonist. There are no known risk factors for lung disease. Her past medical history is significant for asthma.       Review of Systems   Constitutional: Negative for chills, fever and malaise/fatigue.   HENT: Negative for congestion, ear pain and sore throat.    Respiratory: Positive for cough and wheezing. Negative for hemoptysis, sputum production and shortness of breath.    Cardiovascular: Negative for chest pain and palpitations.   All other systems reviewed and are negative.    PMH:  has a past medical history of Asthma, exercise induced (3/4/2013). She also has no past medical history of Diabetes.  MEDS:   Current Outpatient Prescriptions:   •  Albuterol Sulfate 108 (90 Base) MCG/ACT AEROSOL POWDER, BREATH ACTIVATED, Inhale 1 Inhalation by mouth 4 times a day as needed., Disp: 1 Each, Rfl: 3  •  MethylPREDNISolone (MEDROL DOSEPAK) 4 MG Tablet Therapy Pack, Take as directed on package., Disp: 1 Kit, Rfl: 0  •  omeprazole (PRILOSEC) 20 MG delayed-release capsule, TAKE ONE CAPSULE BY MOUTH ONCE DAILY, Disp: 30 Cap, Rfl: 0  •  albuterol (PROVENTIL) 2.5mg/3ml Nebu Soln solution for nebulization, 3 mL by Nebulization route every four hours as needed for Shortness of Breath., Disp: 100 mL, Rfl: 3  •  albuterol 108 (90 BASE) MCG/ACT Aero Soln inhalation aerosol, Inhale 1-2 Puffs by mouth every four hours as needed for Shortness of Breath.,  "Disp: 1 Inhaler, Rfl: 3  •  Cholecalciferol (VITAMIN D) 1000 UNIT Cap, Take 1 Cap by mouth every day., Disp: 30 Cap, Rfl: 11  •  predniSONE (DELTASONE) 20 MG Tab, Take two daily for 5 days, Disp: 10 Tab, Rfl: 0  •  temazepam (RESTORIL) 15 MG Cap, Take 1 Cap by mouth at bedtime as needed for Sleep. TAKE ONE CAPSULE BY MOUTH AT BEDTIME AS NEEDED FOR SLEEP, Disp: 30 Cap, Rfl: 0  •  temazepam (RESTORIL) 15 MG Cap, Take 1 Cap by mouth at bedtime as needed for Sleep., Disp: 30 Cap, Rfl: 0  •  oxycodone-acetaminophen (PERCOCET) 5-325 MG Tab, Take 1-2 Tabs by mouth every four hours as needed., Disp: 15 Tab, Rfl: 0  ALLERGIES:   Allergies   Allergen Reactions   • Doxycycline Diarrhea and Itching   • Ginger Diarrhea and Vomiting   • Codeine Rash and Itching     rash   • Penicillins Hives     Ok with omnicef   • Sumatriptan Itching   • Vicodin [Hydrocodone-Acetaminophen] Vomiting     SURGHX:   Past Surgical History:   Procedure Laterality Date   • CLAVICLE ORIF     • ENDOMETRIAL ABLATION      2009   • TONSILLECTOMY       SOCHX:  reports that she has never smoked. She has never used smokeless tobacco. She reports that she drinks alcohol. She reports that she does not use drugs.  FH: Family history was reviewed, no pertinent findings to report  Medications, Allergies, and current problem list reviewed today in Epic       Objective:     /78   Pulse 74   Temp 37.1 °C (98.7 °F)   Resp 18   Ht 1.6 m (5' 2.99\")   Wt 81.6 kg (180 lb)   SpO2 97%   BMI 31.90 kg/m²      Physical Exam   Constitutional: She is oriented to person, place, and time. She appears well-developed and well-nourished. She is active.  Non-toxic appearance. She does not have a sickly appearance. She does not appear ill. No distress. She is not intubated.   HENT:   Head: Normocephalic and atraumatic.   Right Ear: Hearing, tympanic membrane, external ear and ear canal normal.   Left Ear: Hearing, tympanic membrane, external ear and ear canal normal. "   Nose: Nose normal.   Mouth/Throat: Uvula is midline, oropharynx is clear and moist and mucous membranes are normal.   Eyes: Conjunctivae, EOM and lids are normal.   Neck: Normal range of motion and full passive range of motion without pain. Neck supple.   Cardiovascular: Normal rate, regular rhythm, S1 normal, S2 normal and normal heart sounds.  Exam reveals no gallop and no friction rub.    No murmur heard.  Pulmonary/Chest: Effort normal. No accessory muscle usage. No apnea, no tachypnea and no bradypnea. She is not intubated. No respiratory distress. She has no decreased breath sounds. She has wheezes. She has no rhonchi. She has no rales. She exhibits no tenderness.   Musculoskeletal: Normal range of motion.   Neurological: She is alert and oriented to person, place, and time.   Skin: Skin is warm and dry.   Psychiatric: She has a normal mood and affect. Her speech is normal and behavior is normal. Judgment and thought content normal.   Vitals reviewed.              Assessment/Plan:     1. Mild intermittent asthma with exacerbation    - Albuterol Sulfate 108 (90 Base) MCG/ACT AEROSOL POWDER, BREATH ACTIVATED; Inhale 1 Inhalation by mouth 4 times a day as needed.  Dispense: 1 Each; Refill: 3  - MethylPREDNISolone (MEDROL DOSEPAK) 4 MG Tablet Therapy Pack; Take as directed on package.  Dispense: 1 Kit; Refill: 0    Differential diagnosis, natural history, supportive care discussed. Follow-up with primary care provider within 7-10 days, emergency room precautions discussed.  Patient and/or family appears understanding of information.  Handout and review of patients diagnosis and treatment was discussed extensively.

## 2019-07-08 ENCOUNTER — OFFICE VISIT (OUTPATIENT)
Dept: MEDICAL GROUP | Facility: LAB | Age: 44
End: 2019-07-08

## 2019-07-08 VITALS
OXYGEN SATURATION: 95 % | TEMPERATURE: 97.9 F | BODY MASS INDEX: 35.97 KG/M2 | HEART RATE: 72 BPM | SYSTOLIC BLOOD PRESSURE: 98 MMHG | WEIGHT: 203 LBS | DIASTOLIC BLOOD PRESSURE: 78 MMHG | RESPIRATION RATE: 14 BRPM | HEIGHT: 63 IN

## 2019-07-08 DIAGNOSIS — Z12.31 ENCOUNTER FOR SCREENING MAMMOGRAM FOR BREAST CANCER: ICD-10-CM

## 2019-07-08 DIAGNOSIS — G47.9 SLEEP DISTURBANCE: ICD-10-CM

## 2019-07-08 PROCEDURE — 99213 OFFICE O/P EST LOW 20 MIN: CPT | Performed by: NURSE PRACTITIONER

## 2019-07-08 RX ORDER — PHENTERMINE HYDROCHLORIDE 37.5 MG/1
TABLET ORAL
Qty: 30 TAB | Refills: 3 | Status: SHIPPED
Start: 2019-07-08 | End: 2019-08-05

## 2019-07-08 RX ORDER — TEMAZEPAM 15 MG/1
15 CAPSULE ORAL NIGHTLY PRN
Qty: 30 CAP | Refills: 2 | Status: SHIPPED
Start: 2019-07-08 | End: 2019-08-07

## 2019-07-08 ASSESSMENT — PATIENT HEALTH QUESTIONNAIRE - PHQ9: CLINICAL INTERPRETATION OF PHQ2 SCORE: 0
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
## 2019-12-04 DIAGNOSIS — R23.2 HOT FLASHES: ICD-10-CM

## 2020-01-29 ENCOUNTER — HOSPITAL ENCOUNTER (OUTPATIENT)
Dept: LAB | Facility: MEDICAL CENTER | Age: 45
End: 2020-01-29
Attending: NURSE PRACTITIONER
Payer: COMMERCIAL

## 2020-01-29 DIAGNOSIS — R23.2 HOT FLASHES: ICD-10-CM

## 2020-01-29 LAB
ERYTHROCYTE [DISTWIDTH] IN BLOOD BY AUTOMATED COUNT: 39.7 FL (ref 35.9–50)
HCT VFR BLD AUTO: 45.7 % (ref 37–47)
HGB BLD-MCNC: 15 G/DL (ref 12–16)
MCH RBC QN AUTO: 30.8 PG (ref 27–33)
MCHC RBC AUTO-ENTMCNC: 32.8 G/DL (ref 33.6–35)
MCV RBC AUTO: 93.8 FL (ref 81.4–97.8)
PLATELET # BLD AUTO: 215 K/UL (ref 164–446)
PMV BLD AUTO: 12 FL (ref 9–12.9)
PROGEST SERPL-MCNC: <0.08 NG/ML
RBC # BLD AUTO: 4.87 M/UL (ref 4.2–5.4)
WBC # BLD AUTO: 7.2 K/UL (ref 4.8–10.8)

## 2020-01-29 PROCEDURE — 36415 COLL VENOUS BLD VENIPUNCTURE: CPT

## 2020-01-29 PROCEDURE — 82670 ASSAY OF TOTAL ESTRADIOL: CPT

## 2020-01-29 PROCEDURE — 83001 ASSAY OF GONADOTROPIN (FSH): CPT

## 2020-01-29 PROCEDURE — 84144 ASSAY OF PROGESTERONE: CPT

## 2020-01-29 PROCEDURE — 85027 COMPLETE CBC AUTOMATED: CPT

## 2020-01-29 PROCEDURE — 84443 ASSAY THYROID STIM HORMONE: CPT

## 2020-01-30 ENCOUNTER — OFFICE VISIT (OUTPATIENT)
Dept: MEDICAL GROUP | Facility: LAB | Age: 45
End: 2020-01-30
Payer: COMMERCIAL

## 2020-01-30 VITALS
WEIGHT: 211 LBS | OXYGEN SATURATION: 98 % | HEART RATE: 90 BPM | RESPIRATION RATE: 12 BRPM | SYSTOLIC BLOOD PRESSURE: 128 MMHG | HEIGHT: 63 IN | DIASTOLIC BLOOD PRESSURE: 84 MMHG | TEMPERATURE: 97.7 F | BODY MASS INDEX: 37.39 KG/M2

## 2020-01-30 DIAGNOSIS — R61 NIGHT SWEATS: ICD-10-CM

## 2020-01-30 DIAGNOSIS — Z23 NEED FOR PNEUMOCOCCAL VACCINATION: ICD-10-CM

## 2020-01-30 DIAGNOSIS — R79.89 LOW SERUM PROGESTERONE: ICD-10-CM

## 2020-01-30 DIAGNOSIS — G89.29 CHRONIC PAIN OF LEFT UPPER EXTREMITY: ICD-10-CM

## 2020-01-30 DIAGNOSIS — M79.645 PAIN IN FINGER OF LEFT HAND: ICD-10-CM

## 2020-01-30 DIAGNOSIS — Z23 NEED FOR TDAP VACCINATION: ICD-10-CM

## 2020-01-30 DIAGNOSIS — L60.8 DISCOLORATION OF NAILBEDS: ICD-10-CM

## 2020-01-30 DIAGNOSIS — F51.01 PRIMARY INSOMNIA: ICD-10-CM

## 2020-01-30 DIAGNOSIS — M79.602 CHRONIC PAIN OF LEFT UPPER EXTREMITY: ICD-10-CM

## 2020-01-30 DIAGNOSIS — Z00.00 PREVENTATIVE HEALTH CARE: ICD-10-CM

## 2020-01-30 LAB
ESTRADIOL SERPL-MCNC: 57 PG/ML
FSH SERPL-ACNC: 16 MIU/ML
TSH SERPL DL<=0.005 MIU/L-ACNC: 1.33 UIU/ML (ref 0.38–5.33)

## 2020-01-30 PROCEDURE — 90471 IMMUNIZATION ADMIN: CPT | Performed by: NURSE PRACTITIONER

## 2020-01-30 PROCEDURE — 90715 TDAP VACCINE 7 YRS/> IM: CPT | Performed by: NURSE PRACTITIONER

## 2020-01-30 PROCEDURE — 99215 OFFICE O/P EST HI 40 MIN: CPT | Mod: 25 | Performed by: NURSE PRACTITIONER

## 2020-01-30 PROCEDURE — 90732 PPSV23 VACC 2 YRS+ SUBQ/IM: CPT | Performed by: NURSE PRACTITIONER

## 2020-01-30 PROCEDURE — 90472 IMMUNIZATION ADMIN EACH ADD: CPT | Performed by: NURSE PRACTITIONER

## 2020-01-30 RX ORDER — HYDROXYZINE HYDROCHLORIDE 25 MG/1
25 TABLET, FILM COATED ORAL EVERY EVENING
Qty: 30 TAB | Refills: 2 | Status: SHIPPED | OUTPATIENT
Start: 2020-01-30 | End: 2020-05-22

## 2020-01-30 SDOH — HEALTH STABILITY: MENTAL HEALTH: HOW OFTEN DO YOU HAVE A DRINK CONTAINING ALCOHOL?: NEVER

## 2020-01-30 ASSESSMENT — PATIENT HEALTH QUESTIONNAIRE - PHQ9: CLINICAL INTERPRETATION OF PHQ2 SCORE: 0
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
## 2020-02-05 DIAGNOSIS — G47.9 SLEEP DISTURBANCE: ICD-10-CM

## 2020-02-05 NOTE — TELEPHONE ENCOUNTER
Received request via: Patient    Was the patient seen in the last year in this department? Yes 1-    Does the patient have an active prescription (recently filled or refills available) for medication(s) requested? No   12/24/2019 #30

## 2020-02-06 RX ORDER — TEMAZEPAM 15 MG/1
CAPSULE ORAL
Qty: 30 CAP | Refills: 0 | Status: SHIPPED
Start: 2020-02-06 | End: 2020-03-07

## 2020-02-29 ENCOUNTER — HOSPITAL ENCOUNTER (OUTPATIENT)
Dept: RADIOLOGY | Facility: MEDICAL CENTER | Age: 45
End: 2020-02-29
Attending: ORTHOPAEDIC SURGERY
Payer: COMMERCIAL

## 2020-02-29 DIAGNOSIS — R22.32 MASS OF FINGER OF LEFT HAND: ICD-10-CM

## 2020-02-29 PROCEDURE — A9576 INJ PROHANCE MULTIPACK: HCPCS | Performed by: ORTHOPAEDIC SURGERY

## 2020-02-29 PROCEDURE — 73220 MRI UPPR EXTREMITY W/O&W/DYE: CPT | Mod: LT

## 2020-02-29 PROCEDURE — 700117 HCHG RX CONTRAST REV CODE 255: Performed by: ORTHOPAEDIC SURGERY

## 2020-02-29 RX ADMIN — GADOTERIDOL 15 ML: 279.3 INJECTION, SOLUTION INTRAVENOUS at 09:27

## 2020-03-24 ENCOUNTER — OFFICE VISIT (OUTPATIENT)
Dept: MEDICAL GROUP | Facility: LAB | Age: 45
End: 2020-03-24
Payer: COMMERCIAL

## 2020-03-24 DIAGNOSIS — M79.645 FINGER PAIN, LEFT: ICD-10-CM

## 2020-03-24 DIAGNOSIS — R93.89 ABNORMAL MRI: ICD-10-CM

## 2020-03-24 DIAGNOSIS — E66.9 CLASS 2 OBESITY WITHOUT SERIOUS COMORBIDITY IN ADULT, UNSPECIFIED BMI, UNSPECIFIED OBESITY TYPE: ICD-10-CM

## 2020-03-24 PROCEDURE — 99213 OFFICE O/P EST LOW 20 MIN: CPT | Mod: 95,CR | Performed by: NURSE PRACTITIONER

## 2020-03-24 RX ORDER — GABAPENTIN 100 MG/1
100-300 CAPSULE ORAL 3 TIMES DAILY
Qty: 90 CAP | Refills: 0 | Status: SHIPPED | OUTPATIENT
Start: 2020-03-24 | End: 2020-05-22

## 2020-03-24 NOTE — PROGRESS NOTES
Telephone Appointment Visit   As a means of avoiding spread of COVID-19, this visit is being conducted by telephone. This telephone visit was initiated by the patient and they verbally consented.    Time at start of call: 2:27    Reason for Call:  Symptom Follow-up    Patient Comments / History:   #1- extreme finger pain (4th left finger):  Pt reports that there has been a line present below nail for years without pain and pain started in Jan 2020 per pt - bringing her in here for a visit.  Now seeing Ortho - Dr. Fuller -whom she states has recommended surgery for definitive diagnoses/excision of small nailbed mass involving the fourth digit that measures on MRI 6 x 5 x 2 mm in size. Extremely painful for pt- worsening day by day, particularly if she accidentally touches the finger / nail.  Pain is low grade all the time but dramatically worse in the cold or with contact- causing an electric shock.  Struggling with insurance to get insurance to cover this recommended surgery which makes her very frustrated and tearful.  Told maybe a glomus tumor but can't tell her for sure until surgery / pathology is complete in the differential diagnoses makes her very fearful, including a squamous cell carcinoma or melanoma.  Dr Fuller rx meloxicam for the pain which she was just notified today by the pharmacy but it is ready.  She is in such pain that she tells me she is dreaming at night that she has cut her finger off because of the discomfort.  She works as a hairstylist.    #2-obesity: Chronic issue.  Requesting a refill of phentermine which helps to control her appetite.  She denies any negative side effects of phentermine, particularly denies heart racing or phentermine induced anxiety/insomnia.    Labs / Images Reviewed   MRI dated 2/29/2020    Assessment and Plan:   1. Finger pain, left  -Reviewed hand MRI and recent orthopedic consult note discussing surgery.  Apparently misunderstood patient at our visit in  January in terms of the duration of her pain, she tells me that it was nonpainful until a few weeks before she came in to see me in January.  Patient would really like to move forward with surgery in hopes of improving her quality of life/eliminating her daily pain level.  I will get in touch with her orthopedist's office to see if there is anything I can do to help move along the approval process for the surgical procedure.  She was prescribed gabapentin in the meantime to help decrease her pain level and offer her a bit of improved sleep.  Discussed potential side effects of gabapentin.  Also encouraged her to start meloxicam as prescribed by her orthopedic surgeon.  - gabapentin (NEURONTIN) 100 MG Cap; Take 1-3 Caps by mouth 3 times a day.  Dispense: 90 Cap; Refill: 0    2. Class 2 obesity without serious comorbidity in adult, unspecified BMI, unspecified obesity type  -Phentermine is working well for the patient.  This was refilled.  Reviewed her  profile which is appropriate, showing that I am the only prescriber of this medication for her at this time.    Follow-up: 1 to 2 months regarding phentermine and at any time regarding initiation of gabapentin/her intense finger pain.    Time at end of call: 2:50  Total Time Spent: 21-30 minutes    GORDY Light.

## 2020-05-11 ENCOUNTER — TELEPHONE (OUTPATIENT)
Dept: MEDICAL GROUP | Facility: LAB | Age: 45
End: 2020-05-11

## 2020-05-11 NOTE — TELEPHONE ENCOUNTER
----- Message from Gissel Batista, Med Ass't sent at 5/8/2020  4:46 PM PDT -----  Regarding: URGENT  Jun Naranjo from patient insurance company called requesting for a call back in regards to patient's referral for surgery on her finger. He stated patient is in excruciating pain and her insurance has been trying to get a hold of us for about 8 weeks. Chart notes are needed in order for surgery referral to get approved. Jun did state it was urgent. His phone number is [phone]. If you need to get a hold of patient's , phone number is [phone]. I was not able to take care of this call as we were supper slammed Friday so I do apologize.     Thank you

## 2020-05-22 DIAGNOSIS — F51.01 PRIMARY INSOMNIA: ICD-10-CM

## 2020-05-22 DIAGNOSIS — E66.9 CLASS 2 OBESITY WITHOUT SERIOUS COMORBIDITY IN ADULT, UNSPECIFIED BMI, UNSPECIFIED OBESITY TYPE: ICD-10-CM

## 2020-05-22 DIAGNOSIS — M79.645 FINGER PAIN, LEFT: ICD-10-CM

## 2020-05-22 RX ORDER — GABAPENTIN 100 MG/1
CAPSULE ORAL
Qty: 90 CAP | Refills: 0 | Status: SHIPPED | OUTPATIENT
Start: 2020-05-22 | End: 2020-06-09

## 2020-05-22 RX ORDER — PHENTERMINE HYDROCHLORIDE 37.5 MG/1
TABLET ORAL
Qty: 30 TAB | Refills: 0 | Status: SHIPPED | OUTPATIENT
Start: 2020-05-22 | End: 2020-09-09 | Stop reason: SDUPTHER

## 2020-05-22 RX ORDER — HYDROXYZINE HYDROCHLORIDE 25 MG/1
TABLET, FILM COATED ORAL
Qty: 30 TAB | Refills: 0 | Status: SHIPPED | OUTPATIENT
Start: 2020-05-22 | End: 2020-09-09

## 2020-05-22 NOTE — TELEPHONE ENCOUNTER
Received request via: Pharmacy   4/10/20  Was the patient seen in the last year in this department? Yes  3/24/20  Does the patient have an active prescription (recently filled or refills available) for medication(s) requested? No

## 2020-05-29 ENCOUNTER — OFFICE VISIT (OUTPATIENT)
Dept: ADMISSIONS | Facility: MEDICAL CENTER | Age: 45
End: 2020-05-29
Attending: ORTHOPAEDIC SURGERY
Payer: COMMERCIAL

## 2020-05-29 DIAGNOSIS — Z01.812 PRE-OPERATIVE LABORATORY EXAMINATION: ICD-10-CM

## 2020-05-29 LAB — COVID ORDER STATUS COVID19: NORMAL

## 2020-05-29 PROCEDURE — C9803 HOPD COVID-19 SPEC COLLECT: HCPCS

## 2020-06-01 LAB
SARS-COV-2 RNA RESP QL NAA+PROBE: NOT DETECTED
SPECIMEN SOURCE: NORMAL

## 2020-06-02 ENCOUNTER — ANESTHESIA EVENT (OUTPATIENT)
Dept: SURGERY | Facility: MEDICAL CENTER | Age: 45
End: 2020-06-02
Payer: COMMERCIAL

## 2020-06-02 DIAGNOSIS — Z01.812 PRE-OPERATIVE LABORATORY EXAMINATION: ICD-10-CM

## 2020-06-02 LAB — HCG SERPL QL: NEGATIVE

## 2020-06-02 PROCEDURE — 36415 COLL VENOUS BLD VENIPUNCTURE: CPT

## 2020-06-02 PROCEDURE — 84703 CHORIONIC GONADOTROPIN ASSAY: CPT

## 2020-06-02 RX ORDER — MELOXICAM 15 MG/1
1 TABLET ORAL 2 TIMES DAILY
COMMUNITY
Start: 2020-05-22 | End: 2020-09-09

## 2020-06-02 SDOH — HEALTH STABILITY: MENTAL HEALTH: HOW MANY STANDARD DRINKS CONTAINING ALCOHOL DO YOU HAVE ON A TYPICAL DAY?: 3 OR 4

## 2020-06-02 SDOH — HEALTH STABILITY: MENTAL HEALTH: HOW OFTEN DO YOU HAVE 6 OR MORE DRINKS ON ONE OCCASION?: WEEKLY

## 2020-06-02 SDOH — HEALTH STABILITY: MENTAL HEALTH: HOW OFTEN DO YOU HAVE A DRINK CONTAINING ALCOHOL?: NOT ASKED

## 2020-06-02 NOTE — OR NURSING
Hx and meds reviewed, pre op instructions given. Pt aware may take the listed meds morning of surgery; albuterol if needed and gabapentin. Anesthesia fasting guidelines reviewed with pt.

## 2020-06-03 ENCOUNTER — HOSPITAL ENCOUNTER (OUTPATIENT)
Facility: MEDICAL CENTER | Age: 45
End: 2020-06-03
Attending: ORTHOPAEDIC SURGERY | Admitting: ORTHOPAEDIC SURGERY
Payer: COMMERCIAL

## 2020-06-03 ENCOUNTER — ANESTHESIA (OUTPATIENT)
Dept: SURGERY | Facility: MEDICAL CENTER | Age: 45
End: 2020-06-03
Payer: COMMERCIAL

## 2020-06-03 VITALS
TEMPERATURE: 96.8 F | OXYGEN SATURATION: 97 % | DIASTOLIC BLOOD PRESSURE: 75 MMHG | BODY MASS INDEX: 38.75 KG/M2 | RESPIRATION RATE: 16 BRPM | WEIGHT: 218.7 LBS | SYSTOLIC BLOOD PRESSURE: 122 MMHG | HEIGHT: 63 IN | HEART RATE: 69 BPM

## 2020-06-03 DIAGNOSIS — R22.32 FINGER MASS, LEFT: ICD-10-CM

## 2020-06-03 LAB — PATHOLOGY CONSULT NOTE: NORMAL

## 2020-06-03 PROCEDURE — 160046 HCHG PACU - 1ST 60 MINS PHASE II: Performed by: ORTHOPAEDIC SURGERY

## 2020-06-03 PROCEDURE — 700111 HCHG RX REV CODE 636 W/ 250 OVERRIDE (IP): Performed by: ANESTHESIOLOGY

## 2020-06-03 PROCEDURE — 700101 HCHG RX REV CODE 250: Performed by: ORTHOPAEDIC SURGERY

## 2020-06-03 PROCEDURE — 160048 HCHG OR STATISTICAL LEVEL 1-5: Performed by: ORTHOPAEDIC SURGERY

## 2020-06-03 PROCEDURE — A9270 NON-COVERED ITEM OR SERVICE: HCPCS

## 2020-06-03 PROCEDURE — 500881 HCHG PACK, EXTREMITY: Performed by: ORTHOPAEDIC SURGERY

## 2020-06-03 PROCEDURE — 160002 HCHG RECOVERY MINUTES (STAT): Performed by: ORTHOPAEDIC SURGERY

## 2020-06-03 PROCEDURE — 501838 HCHG SUTURE GENERAL: Performed by: ORTHOPAEDIC SURGERY

## 2020-06-03 PROCEDURE — 88305 TISSUE EXAM BY PATHOLOGIST: CPT

## 2020-06-03 PROCEDURE — 502576 HCHG PACK, HAND: Performed by: ORTHOPAEDIC SURGERY

## 2020-06-03 PROCEDURE — 700102 HCHG RX REV CODE 250 W/ 637 OVERRIDE(OP): Performed by: ANESTHESIOLOGY

## 2020-06-03 PROCEDURE — 160039 HCHG SURGERY MINUTES - EA ADDL 1 MIN LEVEL 3: Performed by: ORTHOPAEDIC SURGERY

## 2020-06-03 PROCEDURE — 700101 HCHG RX REV CODE 250: Performed by: ANESTHESIOLOGY

## 2020-06-03 PROCEDURE — 700101 HCHG RX REV CODE 250

## 2020-06-03 PROCEDURE — 160028 HCHG SURGERY MINUTES - 1ST 30 MINS LEVEL 3: Performed by: ORTHOPAEDIC SURGERY

## 2020-06-03 PROCEDURE — 700102 HCHG RX REV CODE 250 W/ 637 OVERRIDE(OP)

## 2020-06-03 PROCEDURE — 700105 HCHG RX REV CODE 258: Performed by: ORTHOPAEDIC SURGERY

## 2020-06-03 PROCEDURE — 160009 HCHG ANES TIME/MIN: Performed by: ORTHOPAEDIC SURGERY

## 2020-06-03 PROCEDURE — 160025 RECOVERY II MINUTES (STATS): Performed by: ORTHOPAEDIC SURGERY

## 2020-06-03 PROCEDURE — A9270 NON-COVERED ITEM OR SERVICE: HCPCS | Performed by: ANESTHESIOLOGY

## 2020-06-03 PROCEDURE — 160035 HCHG PACU - 1ST 60 MINS PHASE I: Performed by: ORTHOPAEDIC SURGERY

## 2020-06-03 RX ORDER — SODIUM CHLORIDE, SODIUM LACTATE, POTASSIUM CHLORIDE, CALCIUM CHLORIDE 600; 310; 30; 20 MG/100ML; MG/100ML; MG/100ML; MG/100ML
INJECTION, SOLUTION INTRAVENOUS CONTINUOUS
Status: DISCONTINUED | OUTPATIENT
Start: 2020-06-03 | End: 2020-06-03 | Stop reason: HOSPADM

## 2020-06-03 RX ORDER — OXYCODONE HCL 5 MG/5 ML
5 SOLUTION, ORAL ORAL
Status: COMPLETED | OUTPATIENT
Start: 2020-06-03 | End: 2020-06-03

## 2020-06-03 RX ORDER — ACETAMINOPHEN 500 MG
1000 TABLET ORAL ONCE
Status: COMPLETED | OUTPATIENT
Start: 2020-06-03 | End: 2020-06-03

## 2020-06-03 RX ORDER — LIDOCAINE HYDROCHLORIDE 10 MG/ML
INJECTION, SOLUTION INFILTRATION; PERINEURAL
Status: COMPLETED
Start: 2020-06-03 | End: 2020-06-03

## 2020-06-03 RX ORDER — TRAMADOL HYDROCHLORIDE 50 MG/1
50 TABLET ORAL EVERY 4 HOURS PRN
Qty: 17 TAB | Refills: 0 | Status: SHIPPED | OUTPATIENT
Start: 2020-06-03 | End: 2020-06-08

## 2020-06-03 RX ORDER — HYDROMORPHONE HYDROCHLORIDE 1 MG/ML
0.2 INJECTION, SOLUTION INTRAMUSCULAR; INTRAVENOUS; SUBCUTANEOUS
Status: DISCONTINUED | OUTPATIENT
Start: 2020-06-03 | End: 2020-06-03 | Stop reason: HOSPADM

## 2020-06-03 RX ORDER — MIDAZOLAM HYDROCHLORIDE 1 MG/ML
1 INJECTION INTRAMUSCULAR; INTRAVENOUS
Status: DISCONTINUED | OUTPATIENT
Start: 2020-06-03 | End: 2020-06-03 | Stop reason: HOSPADM

## 2020-06-03 RX ORDER — HYDROMORPHONE HYDROCHLORIDE 1 MG/ML
0.1 INJECTION, SOLUTION INTRAMUSCULAR; INTRAVENOUS; SUBCUTANEOUS
Status: DISCONTINUED | OUTPATIENT
Start: 2020-06-03 | End: 2020-06-03 | Stop reason: HOSPADM

## 2020-06-03 RX ORDER — HYDROMORPHONE HYDROCHLORIDE 1 MG/ML
0.4 INJECTION, SOLUTION INTRAMUSCULAR; INTRAVENOUS; SUBCUTANEOUS
Status: DISCONTINUED | OUTPATIENT
Start: 2020-06-03 | End: 2020-06-03 | Stop reason: HOSPADM

## 2020-06-03 RX ORDER — KETAMINE HYDROCHLORIDE 50 MG/ML
INJECTION, SOLUTION INTRAMUSCULAR; INTRAVENOUS PRN
Status: DISCONTINUED | OUTPATIENT
Start: 2020-06-03 | End: 2020-06-03 | Stop reason: SURG

## 2020-06-03 RX ORDER — MIDAZOLAM HYDROCHLORIDE 1 MG/ML
INJECTION INTRAMUSCULAR; INTRAVENOUS PRN
Status: DISCONTINUED | OUTPATIENT
Start: 2020-06-03 | End: 2020-06-03 | Stop reason: SURG

## 2020-06-03 RX ORDER — DEXAMETHASONE SODIUM PHOSPHATE 4 MG/ML
INJECTION, SOLUTION INTRA-ARTICULAR; INTRALESIONAL; INTRAMUSCULAR; INTRAVENOUS; SOFT TISSUE PRN
Status: DISCONTINUED | OUTPATIENT
Start: 2020-06-03 | End: 2020-06-03 | Stop reason: SURG

## 2020-06-03 RX ORDER — DIPHENHYDRAMINE HYDROCHLORIDE 50 MG/ML
12.5 INJECTION INTRAMUSCULAR; INTRAVENOUS
Status: DISCONTINUED | OUTPATIENT
Start: 2020-06-03 | End: 2020-06-03 | Stop reason: HOSPADM

## 2020-06-03 RX ORDER — KETOROLAC TROMETHAMINE 30 MG/ML
INJECTION, SOLUTION INTRAMUSCULAR; INTRAVENOUS PRN
Status: DISCONTINUED | OUTPATIENT
Start: 2020-06-03 | End: 2020-06-03 | Stop reason: SURG

## 2020-06-03 RX ORDER — LABETALOL HYDROCHLORIDE 5 MG/ML
5 INJECTION, SOLUTION INTRAVENOUS
Status: DISCONTINUED | OUTPATIENT
Start: 2020-06-03 | End: 2020-06-03 | Stop reason: HOSPADM

## 2020-06-03 RX ORDER — OXYCODONE HCL 5 MG/5 ML
10 SOLUTION, ORAL ORAL
Status: COMPLETED | OUTPATIENT
Start: 2020-06-03 | End: 2020-06-03

## 2020-06-03 RX ORDER — ONDANSETRON 2 MG/ML
INJECTION INTRAMUSCULAR; INTRAVENOUS PRN
Status: DISCONTINUED | OUTPATIENT
Start: 2020-06-03 | End: 2020-06-03 | Stop reason: SURG

## 2020-06-03 RX ORDER — BUPIVACAINE HYDROCHLORIDE AND EPINEPHRINE 5; 5 MG/ML; UG/ML
INJECTION, SOLUTION EPIDURAL; INTRACAUDAL; PERINEURAL
Status: DISCONTINUED | OUTPATIENT
Start: 2020-06-03 | End: 2020-06-03 | Stop reason: HOSPADM

## 2020-06-03 RX ORDER — HYDRALAZINE HYDROCHLORIDE 20 MG/ML
5 INJECTION INTRAMUSCULAR; INTRAVENOUS
Status: DISCONTINUED | OUTPATIENT
Start: 2020-06-03 | End: 2020-06-03 | Stop reason: HOSPADM

## 2020-06-03 RX ORDER — CEFAZOLIN SODIUM 1 G/3ML
INJECTION, POWDER, FOR SOLUTION INTRAMUSCULAR; INTRAVENOUS PRN
Status: DISCONTINUED | OUTPATIENT
Start: 2020-06-03 | End: 2020-06-03 | Stop reason: SURG

## 2020-06-03 RX ORDER — MAGNESIUM SULFATE HEPTAHYDRATE 40 MG/ML
INJECTION, SOLUTION INTRAVENOUS PRN
Status: DISCONTINUED | OUTPATIENT
Start: 2020-06-03 | End: 2020-06-03 | Stop reason: SURG

## 2020-06-03 RX ORDER — HALOPERIDOL 5 MG/ML
1 INJECTION INTRAMUSCULAR
Status: DISCONTINUED | OUTPATIENT
Start: 2020-06-03 | End: 2020-06-03 | Stop reason: HOSPADM

## 2020-06-03 RX ADMIN — SODIUM CHLORIDE, POTASSIUM CHLORIDE, SODIUM LACTATE AND CALCIUM CHLORIDE: 600; 310; 30; 20 INJECTION, SOLUTION INTRAVENOUS at 11:14

## 2020-06-03 RX ADMIN — LIDOCAINE HYDROCHLORIDE 0.5 ML: 10 INJECTION, SOLUTION INFILTRATION; PERINEURAL at 10:37

## 2020-06-03 RX ADMIN — POVIDONE-IODINE 15 ML: 10 SOLUTION TOPICAL at 10:37

## 2020-06-03 RX ADMIN — FENTANYL CITRATE 50 MCG: 50 INJECTION, SOLUTION INTRAMUSCULAR; INTRAVENOUS at 11:32

## 2020-06-03 RX ADMIN — KETAMINE HYDROCHLORIDE 30 MG: 50 INJECTION INTRAMUSCULAR; INTRAVENOUS at 11:19

## 2020-06-03 RX ADMIN — KETOROLAC TROMETHAMINE 30 MG: 30 INJECTION, SOLUTION INTRAMUSCULAR at 11:44

## 2020-06-03 RX ADMIN — Medication 0.5 ML: at 10:37

## 2020-06-03 RX ADMIN — MIDAZOLAM HYDROCHLORIDE 2 MG: 1 INJECTION, SOLUTION INTRAMUSCULAR; INTRAVENOUS at 11:12

## 2020-06-03 RX ADMIN — ONDANSETRON 8 MG: 2 INJECTION INTRAMUSCULAR; INTRAVENOUS at 11:44

## 2020-06-03 RX ADMIN — DEXAMETHASONE SODIUM PHOSPHATE 10 MG: 4 INJECTION, SOLUTION INTRAMUSCULAR; INTRAVENOUS at 11:19

## 2020-06-03 RX ADMIN — PROPOFOL 50 MCG/KG/MIN: 10 INJECTION, EMULSION INTRAVENOUS at 11:19

## 2020-06-03 RX ADMIN — SODIUM CHLORIDE, POTASSIUM CHLORIDE, SODIUM LACTATE AND CALCIUM CHLORIDE: 600; 310; 30; 20 INJECTION, SOLUTION INTRAVENOUS at 10:38

## 2020-06-03 RX ADMIN — ACETAMINOPHEN 1000 MG: 500 TABLET, FILM COATED ORAL at 10:38

## 2020-06-03 RX ADMIN — MAGNESIUM SULFATE IN WATER 4 G: 40 INJECTION, SOLUTION INTRAVENOUS at 11:21

## 2020-06-03 RX ADMIN — CEFAZOLIN 2 G: 1 INJECTION, POWDER, FOR SOLUTION INTRAVENOUS at 11:19

## 2020-06-03 RX ADMIN — OXYCODONE HYDROCHLORIDE 5 MG: 5 SOLUTION ORAL at 13:03

## 2020-06-03 RX ADMIN — PROPOFOL 200 MG: 10 INJECTION, EMULSION INTRAVENOUS at 11:17

## 2020-06-03 ASSESSMENT — PAIN SCALES - GENERAL: PAIN_LEVEL: 0

## 2020-06-03 NOTE — ANESTHESIA TIME REPORT
Anesthesia Start and Stop Event Times     Date Time Event    6/3/2020 1105 Ready for Procedure     1114 Anesthesia Start     1201 Anesthesia Stop        Responsible Staff  06/03/20    Name Role Begin End    Martha Joaquin M.D. Anesth 1114 1201        Preop Diagnosis (Free Text):  Pre-op Diagnosis     PAIN IN FINGER OF LEFT HAND        Preop Diagnosis (Codes):    Post op Diagnosis  Finger mass, left      Premium Reason  Non-Premium    Comments:

## 2020-06-03 NOTE — ANESTHESIA POSTPROCEDURE EVALUATION
Patient: Patito Briscoe    Procedure Summary     Date:  06/03/20 Room / Location:   OR 02 / SURGERY [facility]    Anesthesia Start:  1114 Anesthesia Stop:  1201    Procedures:       IRRIGATION AND DEBRIDEMENT, WOUND- FOR RING FINGER NAIL REMOVAL (Left Finger)      EXCISION, MASS- SUBUNGUAL (Left Finger) Diagnosis:  (PAIN IN FINGER OF LEFT HAND)    Surgeon:  Guero Blankenship M.D. Responsible Provider:  Martha Joaquin M.D.    Anesthesia Type:  general ASA Status:  2          Final Anesthesia Type: general  Last vitals  BP   Blood Pressure: 147/88    Temp   36.4 °C (97.5 °F)    Pulse   Pulse: 72   Resp   18    SpO2   97 %      Anesthesia Post Evaluation    Patient location during evaluation: PACU  Patient participation: complete - patient participated  Level of consciousness: awake and alert  Pain score: 0    Airway patency: patent  Anesthetic complications: no  Cardiovascular status: hemodynamically stable  Respiratory status: acceptable  Hydration status: euvolemic    PONV: none           Nurse Pain Score: 6 (NPRS)

## 2020-06-03 NOTE — OR NURSING
1155 Pt arrived from OR post   IRRIGATION AND DEBRIDEMENT, WOUND- FOR RING FINGER NAIL REMOVAL  Left  General    EXCISION, MASS- SUBUNGUAL  Left     , report received. Pt breathing unlabored with clear lung sounds bilat.    1243 Report to Ros MESSINA. Pt states pain is controled and tolerable, denies nausea. Pt tolerating PO fluids. Pt to phase II

## 2020-06-03 NOTE — ANESTHESIA PROCEDURE NOTES
Airway    Date/Time: 6/3/2020 11:18 AM  Performed by: Martha Joaquin M.D.  Authorized by: Martha Joaquin M.D.     Location:  OR  Urgency:  Elective  Indications for Airway Management:  Anesthesia      Spontaneous Ventilation: absent    Sedation Level:  Deep  Preoxygenated: Yes    Mask Difficulty Assessment:  0 - not attempted  Final Airway Type:  Supraglottic airway  Final Supraglottic Airway:  Standard LMA    SGA Size:  4  Number of Attempts at Approach:  1

## 2020-06-03 NOTE — OR NURSING
Oral pain medication administered for 4/10 pain to left ring finger. Pt discharged to the care of her  following an uneventful stay in the discharge area.

## 2020-06-03 NOTE — DISCHARGE INSTRUCTIONS
ACTIVITY: Rest and take it easy for the first 24 hours.  A responsible adult is recommended to remain with you during that time.  It is normal to feel sleepy.  We encourage you to not do anything that requires balance, judgment or coordination.    MILD FLU-LIKE SYMPTOMS ARE NORMAL. YOU MAY EXPERIENCE GENERALIZED MUSCLE ACHES, THROAT IRRITATION, HEADACHE AND/OR SOME NAUSEA.    FOR 24 HOURS DO NOT:  Drive, operate machinery or run household appliances.  Drink beer or alcoholic beverages.   Make important decisions or sign legal documents.    SPECIAL INSTRUCTIONS: Follow Dr wandy    DIET: To avoid nausea, slowly advance diet as tolerated, avoiding spicy or greasy foods for the first day.  Add more substantial food to your diet according to your physician's instructions.  Babies can be fed formula or breast milk as soon as they are hungry.  INCREASE FLUIDS AND FIBER TO AVOID CONSTIPATION.    SURGICAL DRESSING/BATHING: OK to remove dressing in 2 days and also remove yellow gauze from finger.     FOLLOW-UP APPOINTMENT:  A follow-up appointment should be arranged with your doctor; call to schedule.    You should CALL YOUR PHYSICIAN if you develop:  Fever greater than 101 degrees F.  Pain not relieved by medication, or persistent nausea or vomiting.  Excessive bleeding (blood soaking through dressing) or unexpected drainage from the wound.  Extreme redness or swelling around the incision site, drainage of pus or foul smelling drainage.  Inability to urinate or empty your bladder within 8 hours.  Problems with breathing or chest pain.    You should call 911 if you develop problems with breathing or chest pain.  If you are unable to contact your doctor or surgical center, you should go to the nearest emergency room or urgent care center.  Physician's telephone #: [phone] Dr Faye    If any questions arise, call your doctor.  If your doctor is not available, please feel free to call the Surgical Center at  [phone].  The Center is open Monday through Friday from 7AM to 7PM.  You can also call the HEALTH HOTLINE open 24 hours/day, 7 days/week and speak to a nurse at [phone], or toll free at [phone].    A registered nurse may call you a few days after your surgery to see how you are doing after your procedure.    MEDICATIONS: Resume taking daily medication.  Take prescribed pain medication with food.  If no medication is prescribed, you may take non-aspirin pain medication if needed.  PAIN MEDICATION CAN BE VERY CONSTIPATING.  Take a stool softener or laxative such as senokot, pericolace, or milk of magnesia if needed.    Prescription given for Tramadol.  Last pain medication given at n/a.    If your physician has prescribed pain medication that includes Acetaminophen (Tylenol), do not take additional Acetaminophen (Tylenol) while taking the prescribed medication.    Depression / Suicide Risk    As you are discharged from this [facility] facility, it is important to learn how to keep safe from harming yourself.    Recognize the warning signs:  · Abrupt changes in personality, positive or negative- including increase in energy   · Giving away possessions  · Change in eating patterns- significant weight changes-  positive or negative  · Change in sleeping patterns- unable to sleep or sleeping all the time   · Unwillingness or inability to communicate  · Depression  · Unusual sadness, discouragement and loneliness  · Talk of wanting to die  · Neglect of personal appearance   · Rebelliousness- reckless behavior  · Withdrawal from people/activities they love  · Confusion- inability to concentrate     If you or a loved one observes any of these behaviors or has concerns about self-harm, here's what you can do:  · Talk about it- your feelings and reasons for harming yourself  · Remove any means that you might use to hurt yourself (examples: pills, rope, extension cords, firearm)  · Get professional help  from the community (Mental Health, Substance Abuse, psychological counseling)  · Do not be alone:Call your Safe Contact- someone whom you trust who will be there for you.  · Call your local CRISIS HOTLINE 489-3032 or 995-136-4082  · Call your local Children's Mobile Crisis Response Team [place] (785) 228-2669 or www.RuckPack  · Call the toll free National Suicide Prevention Hotlines   · National Suicide Prevention Lifeline 767-790-MLHJ (6390)  · National Hope Line Network 800-SUICIDE (165-3013)

## 2020-06-03 NOTE — ANESTHESIA PREPROCEDURE EVALUATION
45yo F here for I & D finger and nail removal    Relevant Problems   PULMONARY   (+) Asthma, exercise induced      NEURO   (+) Generalized headaches      GI   (+) Gastroesophageal reflux disease without esophagitis       Physical Exam    Airway   Mallampati: II  TM distance: >3 FB  Neck ROM: full       Cardiovascular - normal exam  Rhythm: regular  Rate: normal  (-) murmur     Dental - normal exam           Pulmonary - normal exam  Breath sounds clear to auscultation     Abdominal    Neurological - normal exam                 Anesthesia Plan    ASA 2       Plan - general       Airway plan will be LMA        Induction: intravenous    Postoperative Plan: Postoperative administration of opioids is intended.    Pertinent diagnostic labs and testing reviewed    Informed Consent:    Anesthetic plan and risks discussed with patient.    Use of blood products discussed with: patient whom consented to blood products.

## 2020-06-04 NOTE — OP REPORT
DATE OF SERVICE:  06/03/2020    PREOPERATIVE DIAGNOSIS:  Left ring finger subungual mass.    POSTOPERATIVE DIAGNOSIS:  Left ring finger subungual mass.    SURGERIES PERFORMED:  1.  Mass excision, left ring finger, subungual.  2.  Left ring finger nail removal.    SURGEON:  Guero Blankenship MD    ANESTHESIA:  General.    COMPLICATIONS:  None.    SPECIMENS:  Left ring finger subungual mass.    TOURNIQUET TIME:  17 minutes.    TOURNIQUET PRESSURE:  250 mmHg.    ESTIMATED BLOOD LOSS:  1  mL    INDICATIONS FOR PROCEDURE:  The patient is a pleasant lady who has had roughly   1 year of persistent pain in the left ring finger, as well as a new onset   streaking along the ring finger.  This pain has been recalcitrant to   nonoperative treatments including rest, NSAIDS, activity modifications, and   icing.  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    DESCRIPTION OF PROCEDURE:  On the day of surgery, the patient was seen in the   preoperative area where informed consent was obtained with all risks and   benefits of the procedure explained and all questions answered.  She wished to   proceed with the surgery.  The proper site was marked.  She was subsequently   taken to the operating room and placed in the supine position.  All bony   prominences well padded.  General endotracheal anesthesia was induced.  A   tourniquet was placed on the left upper extremity.  She was then prepped and   draped in the usual sterile fashion.    A timeout was performed with all persons in attendance agreeing on the proper   patient, proper surgical site, and proper surgery to be performed.    An [place] was used to exsanguinate the left upper extremity and the   tourniquet was insufflated to 250 mmHg.  A [place] was first used to gently   elevate the nail plate from the  underlying nail bed.  This was then removed   from the germinal matrix.  There was no significant damage or mass noted along   the nail and therefore this was not passed off for pathologic evaluation.    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  I then used a [place] blade to excise this area and I   attempted to excise it in its entirety.  However, it did require small   additional resection in the more radial aspect.  After removing this tumor.    It was measured to be 3 mm in diameter.  Its shape and form was also   consistent with a glomus tumor.  In order to fully access this area of tumor,   I did have to incise along the eponychial fold on both the radial and lateral   ulnar aspects of the fold.  This was then elevated using a skin hook.    The wound was then thoroughly irrigated with copious amounts of normal saline.    The underlying nail bed was attempted to be repaired using 4-0 chromic gut   suture, which did reapproximate the edges nearly completely, but not 100%.  I   felt that this was adequate closure.  I do believe that she will do well from   this.  I then repaired the incisions along the eponychial fold, which were   repaired using 4-0 Vicryl.  The wound was then dressed with triple antibiotic   ointment in the nail bed and Xeroform gauze was placed under the eponychial   fold to prevent scarring.  This was then dressed with 4x4 and tube gauze.  The   tourniquet was deflated.  General endotracheal anesthesia was reversed.  She   was taken to the PACU in good and stable condition.    DISPOSITION:  She will be discharged home on a regular diet.  She will have a   10-pound lifting restriction of the left upper extremity.  She should apply   ice and elevate as much as possible for the next 72 hours.  She will return to   the clinic in 10-14 days for repeat evaluation.  She was prescribed narcotic   pain medication and instructed not to drive  or operate machinery while taking   this medication.       ____________________________________     MD FLASH Fried / JARRED    DD:  06/03/2020 22:52:35  DT:  06/04/2020 01:12:49    D#:  [id number]  Job#:  [id number]

## 2020-06-09 DIAGNOSIS — M79.645 FINGER PAIN, LEFT: ICD-10-CM

## 2020-06-09 RX ORDER — GABAPENTIN 100 MG/1
CAPSULE ORAL
Qty: 90 CAP | Refills: 0 | Status: SHIPPED | OUTPATIENT
Start: 2020-06-09 | End: 2020-09-09

## 2020-06-09 NOTE — TELEPHONE ENCOUNTER
Received request via: Patient    Was the patient seen in the last year in this department? Yes  3/24/2020  Does the patient have an active prescription (recently filled or refills available) for medication(s) requested? No

## 2020-09-09 ENCOUNTER — TELEMEDICINE (OUTPATIENT)
Dept: MEDICAL GROUP | Facility: LAB | Age: 45
End: 2020-09-09
Payer: COMMERCIAL

## 2020-09-09 VITALS — HEIGHT: 63 IN | BODY MASS INDEX: 38.74 KG/M2

## 2020-09-09 DIAGNOSIS — R53.83 LOW ENERGY: ICD-10-CM

## 2020-09-09 DIAGNOSIS — E66.9 CLASS 2 OBESITY WITHOUT SERIOUS COMORBIDITY IN ADULT, UNSPECIFIED BMI, UNSPECIFIED OBESITY TYPE: ICD-10-CM

## 2020-09-09 DIAGNOSIS — D18.00 GLOMUS TUMOR: ICD-10-CM

## 2020-09-09 DIAGNOSIS — M67.449 DIGITAL MUCINOUS CYST: ICD-10-CM

## 2020-09-09 DIAGNOSIS — Z13.820 SCREENING FOR OSTEOPOROSIS: ICD-10-CM

## 2020-09-09 DIAGNOSIS — K13.0 LIP MASS: ICD-10-CM

## 2020-09-09 DIAGNOSIS — R68.89 FREQUENTLY SICK: ICD-10-CM

## 2020-09-09 DIAGNOSIS — M25.50 MULTIPLE JOINT PAIN: ICD-10-CM

## 2020-09-09 PROCEDURE — 99214 OFFICE O/P EST MOD 30 MIN: CPT | Mod: 95,CR | Performed by: NURSE PRACTITIONER

## 2020-09-09 RX ORDER — PHENTERMINE HYDROCHLORIDE 37.5 MG/1
TABLET ORAL
Qty: 30 TAB | Refills: 2 | Status: SHIPPED | OUTPATIENT
Start: 2020-09-09 | End: 2021-01-07

## 2020-09-09 NOTE — PROGRESS NOTES
"Telemedicine: Established Patient   This evaluation was conducted via Zoom using secure and encrypted videoconferencing technology. The patient was in a private location in the state of [place].    The patient's identity was confirmed and verbal consent was obtained for this virtual visit.    Subjective:   CC:   Chief Complaint   Patient presents with   • Other     discuss upcoming surgery        Patito is a 45 y.o. female presenting for evaluation and management of:    Meeting today regarding patient's concern over autoimmune dz.  Concerned about neurofibromatosis as a friend of hers is a physician who suggested this related to multiple growths on her extremities as well as a few dark lesions she has had on her body.  She denies any family history of neurofibromatosis.  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  Now growth to 2nd toe right foot (present x 2 mo) and bottom lip (present x 7 months approx).  Numb / tingling sensation to toe but not lip.  No sensation to lip growth.   \"I always have joint pain to multiple joints.\"    Struggles with energy and obesity.  Frequent unexplained rashes to legs that itch / burn - \"from almost any contact.\"    Hx of skin tags to eyelids / hands when she was born.   Had multiple bony fractures as a child (ankle x 1 / foot x 2 - sliding on snow / second was cousin stepping on foot).  Broke elbow / thumb as a child.    Right leg is shorter than left leg, per pt's chiropractor.  + known scoliosis.    Mentions 3 brown spots on back of leg \"when I was little, now gone.\"    Had uterine ablation a few yrs ago b/c of \"growth in my uterus.\"    No known family history of autoimmune disease.    Also requesting refill of phentermine.  Responds well to phentermine in terms of increased energy and decreased appetite.  Struggles with her weight and energy level on a day-to-day basis.  Denies any negative side effects of phentermine.    ROS  Denies abd pain, " "n/v/d    Allergies   Allergen Reactions   • Sulfa Drugs Hives     Internal and external   • Tape      Blisters, even with paper tape  tegaderm is OK       Current medicines (including changes today)  Current Outpatient Medications   Medication Sig Dispense Refill   • omeprazole (PRILOSEC) 20 MG delayed-release capsule TAKE ONE CAPSULE BY MOUTH ONCE DAILY (Patient taking differently: Take 20 mg by mouth every bedtime.) 30 Cap 0   • gabapentin (NEURONTIN) 100 MG Cap TAKE 1 TO 3 CAPSULES BY MOUTH THREE TIMES DAILY 90 Cap 0   • meloxicam (MOBIC) 15 MG tablet Take 1 Tab by mouth 2 Times a Day.     • hydrOXYzine HCl (ATARAX) 25 MG Tab TAKE 1 TABLET BY MOUTH IN THE EVENING FOR SLEEP 30 Tab 0     No current facility-administered medications for this visit.        Patient Active Problem List    Diagnosis Date Noted   • Asthma, exercise induced 03/04/2013     Priority: Low   • Finger mass, left 06/03/2020   • Sleep disturbance 07/08/2019   • Class 2 obesity without serious comorbidity in adult 05/21/2018   • Generalized headaches 02/06/2017   • Gastroesophageal reflux disease without esophagitis 04/25/2016   • Multiple joint pain 04/25/2016   • Other fatigue 04/25/2016   • Diarrhea 04/25/2016       Family History   Problem Relation Age of Onset   • Diabetes Mother    • Heart Disease Father    • Hypertension Father    • Stroke Father         from an TBI   • Diabetes Paternal Uncle    • Diabetes Maternal Grandfather        She  has a past medical history of Asthma, exercise induced (3/4/2013) and Heart burn. She also has no past medical history of Diabetes.  She  has a past surgical history that includes tonsillectomy; clavicle orif; endometrial ablation; mammoplasty augmentation; irrigation & debridement ortho (Left, 6/3/2020); and mass excision ortho (Left, 6/3/2020).       Objective:   Ht 1.6 m (5' 3\")   BMI 38.74 kg/m²     Physical Exam  Gen. 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 Digital mucinous cyst  REFERRAL TO GENETICS   3. Lip mass  REFERRAL TO GENETICS   4. Frequently sick  REFERRAL TO GENETICS   5. Multiple joint pain  REFERRAL TO GENETICS   6. Screening for osteoporosis  DS-BONE DENSITY STUDY (DEXA)   7. Class 2 obesity without serious comorbidity in adult, unspecified BMI, unspecified obesity type  phentermine (ADIPEX-P) 37.5 MG tablet   8. Low energy  phentermine (ADIPEX-P) 37.5 MG tablet     Patient was referred to a genetic specialist as she would like to be tested for neurofibromatosis.  If genetic work-up is negative, recommend she contact me for possible muscular autoimmune work-up to include rheumatoid factor, BRITANY, sed rate, CCP.  Also recommend DEXA scan as she mentioned that she easily broke bones as a child.    Refilled phentermine.  Reminded her potential side effects of phentermine.  Considering her weight and phentermine use, recommend spot checking blood pressure and heart rate, emailing me if blood pressure readings are consistently greater than 140/90 or heart rate above 100.  Encouraged her to increase her exercise frequency and duration as well as control her portions.    In prescribing controlled substances to this patient, I certify that I have obtained and reviewed the medical history of Patito Briscoe. I have also made a good leslie effort to obtain applicable records from other providers who have treated the patient and records did not demonstrate any increased risk of substance abuse that would prevent me from prescribing controlled substances.     I have conducted a physical exam and documented it. I have reviewed Ms. Briscoe’s prescription history as maintained by the [place] Prescription Monitoring Program.       Follow-up:  4-6 weeks pending wait on  genetic consult         Total face to face time 25 minutes of which over 50% of this visit is spent in counseling, education and outlining a plan of treatment and coordination of care for the above conditions. This included but was not limited to discussion of medication options and potential risks related to the medications, referral and specialty care options. All patient questions were answered

## 2020-10-26 ENCOUNTER — OFFICE VISIT (OUTPATIENT)
Dept: HEMATOLOGY ONCOLOGY | Facility: MEDICAL CENTER | Age: 45
End: 2020-10-26

## 2020-10-26 VITALS
HEIGHT: 63 IN | WEIGHT: 223.44 LBS | OXYGEN SATURATION: 98 % | DIASTOLIC BLOOD PRESSURE: 76 MMHG | HEART RATE: 76 BPM | TEMPERATURE: 96.6 F | SYSTOLIC BLOOD PRESSURE: 124 MMHG | RESPIRATION RATE: 16 BRPM | BODY MASS INDEX: 39.59 KG/M2

## 2020-10-26 DIAGNOSIS — D18.00 GLOMUS TUMOR: ICD-10-CM

## 2020-10-26 PROCEDURE — 99358 PROLONG SERVICE W/O CONTACT: CPT | Performed by: MEDICAL GENETICS

## 2020-10-26 PROCEDURE — 99203 OFFICE O/P NEW LOW 30 MIN: CPT | Performed by: MEDICAL GENETICS

## 2020-10-26 NOTE — NON-PROVIDER
Instructed patient on how to successfully collect an in office saliva specimen, patient agreed and verbalized understanding.

## 2020-10-26 NOTE — PROGRESS NOTES
"Non face to face prolonged services of clinical data and chart information reviewed for a total time of 30 performed on 10/23,24,25 for Patito Briscoe  Reviewed were:    Referral    Previous encounters    Imaging all mammography, colonoscopy, CT/tomography, MRI and PET studies    Previous procedures all surgical and biopsy procedures including pathology studies and interpretation     Notes    Media all personal and family clinical data from outside institutions including germline DNA sequence studies    Miscellaneous reports    Patient summaries family history as documented by referring clinician  Counseling, testing information and materials prepared  \"I spent 30 minutes  from 1100 to 1130 reviewing past records, prior to visit pertaining to genetic risk.\"   Rm Sethi M.D  edited  "

## 2020-10-26 NOTE — PROGRESS NOTES
GENETIC RISK ASSESSMENT    Patito Briscoe is a 45 y.o. year old patient who was referred by Elda for cancer genetic risk assessment.    Chief Complaint: glomus tumor and others and family history    HPI: The patient carries longstanding diagnoses of uterin, tonsillar, perineural and mucosal tumors generally benign. She has a recent diagnosis of glomus tumor  Family historyaunt (panc)   Review of personal and family histories suggested that a cancer genetic risk assessment was in order.    Review of Systems (ROS):  Constitutional N  Eyes N  ENT N   Respiratory N  Cardiovascular N  Gastrointestinal N  Genitourinary N  Musculoskeletal N  Skin N  Neurological N  Endocrine N  Psychiatric N  Hematologic/lymphatic N  Allergic/Immunologic pcn  All other systems reviewed and are negative.    History (Past/Family)  Family History:   Family History   Problem Relation Age of Onset   • Diabetes Mother    • Heart Disease Father    • Hypertension Father    • Stroke Father         from an TBI   • Diabetes Paternal Uncle    • Diabetes Maternal Grandfather       3 generation pedigree built   Yes   [place] empiric risk calculation No   [place] II empiric risk calculation  No    Social History:       Social History     Socioeconomic History   • Marital status:      Spouse name: Not on file   • Number of children: Not on file   • Years of education: Not on file   • Highest education level: Not on file   Occupational History   • Not on file   Social Needs   • Financial resource strain: Not on file   • Food insecurity     Worry: Not on file     Inability: Not on file   • Transportation needs     Medical: Not on file     Non-medical: Not on file   Tobacco Use   • Smoking status: Never Smoker   • Smokeless tobacco: Never Used   Substance and Sexual Activity   • Alcohol use: Yes     Drinks per session: 3 or 4     Binge frequency: Weekly   • Drug use: No   • Sexual activity: Yes     Partners: Male     Birth  "control/protection: Surgical     Comment: ; 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; self employeed    Lifestyle   • Physical activity     Days per week: Not on file     Minutes per session: Not on file   • Stress: Not on file   Relationships   • Social connections     Talks on phone: Not on file     Gets together: Not on file     Attends Voodoo service: Not on file     Active member of club or organization: Not on file     Attends meetings of clubs or organizations: Not on file     Relationship status: Not on file   • Intimate partner violence     Fear of current or ex partner: Not on file     Emotionally abused: Not on file     Physically abused: Not on file     Forced sexual activity: Not on file   Other Topics Concern   • Not on file   Social History Narrative   • Not on file       Patient Past History:  Past Medical History:   Diagnosis Date   • Asthma, exercise induced 3/4/2013   • Heart burn            NCCN Testing Criteria Met                            Yes    Physical Exam  Constitutional    /76 (BP Location: Left arm, Patient Position: Sitting, BP Cuff Size: Adult)   Pulse 76   Temp 35.9 °C (96.6 °F) (Temporal)   Resp 16   Ht 1.6 m (5' 3\")   Wt 101.4 kg (223 lb 7 oz)   SpO2 98%   BMI 39.58 kg/m²     No pe per covid protocol     Assessment and Plan:  Patient with history of multiple tumors (including glomus) and family history of pancreatic    I spent a total of 30 minutes of face to face time with >50% of time spent in counseling and coordination of care discussing matters stated in above note.    [facility] genetics panel      Rm Sethi M.D.  "

## 2020-11-16 ENCOUNTER — TELEMEDICINE (OUTPATIENT)
Dept: HEMATOLOGY ONCOLOGY | Facility: MEDICAL CENTER | Age: 45
End: 2020-11-16

## 2020-11-16 DIAGNOSIS — Z80.9 FAMILY HISTORY OF CANCER: ICD-10-CM

## 2020-11-16 PROCEDURE — 99212 OFFICE O/P EST SF 10 MIN: CPT | Performed by: MEDICAL GENETICS

## 2020-11-16 NOTE — PROGRESS NOTES
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 This finding should have no effect on clinical management until fully characterized.    All questions answered

## 2020-11-16 NOTE — PROGRESS NOTES
"Non face to face prolonged services of clinical data and chart information reviewed for a total time of 30 performed on 11/13,14,15 for J Rachna    Referral    Previous encounters    Imaging mammography, colonoscopy, CT/tomography, PET and MRI    Previous procedures all surgical and biopsy procedures including pathology analysis and interpretation    Notes    Media all personal and family clinical data from outside institutions including germline DNA studies and results    Miscellaneous reports    Patient summaries family history as documented by referring clinician  Counseling, testing information and materials prepared  \"I spent 30 minutes  from 1730 to 1800 reviewing past records, prior to visit pertaining to genetic risk.\"   Rm Sethi M.D.  edited  "

## 2021-01-07 DIAGNOSIS — E66.9 CLASS 2 OBESITY WITHOUT SERIOUS COMORBIDITY IN ADULT, UNSPECIFIED BMI, UNSPECIFIED OBESITY TYPE: ICD-10-CM

## 2021-01-07 DIAGNOSIS — R53.83 LOW ENERGY: ICD-10-CM

## 2021-01-07 RX ORDER — PHENTERMINE HYDROCHLORIDE 37.5 MG/1
TABLET ORAL
Qty: 30 TAB | Refills: 0 | Status: SHIPPED | OUTPATIENT
Start: 2021-01-07 | End: 2021-02-22 | Stop reason: SDUPTHER

## 2021-01-07 NOTE — TELEPHONE ENCOUNTER
Received request via: Pharmacy    Was the patient seen in the last year in this department? Yes  11/16/2020  Does the patient have an active prescription (recently filled or refills available) for medication(s) requested? No

## 2021-02-22 ENCOUNTER — PATIENT MESSAGE (OUTPATIENT)
Dept: MEDICAL GROUP | Facility: LAB | Age: 46
End: 2021-02-22

## 2021-02-22 DIAGNOSIS — E66.9 CLASS 2 OBESITY WITHOUT SERIOUS COMORBIDITY IN ADULT, UNSPECIFIED BMI, UNSPECIFIED OBESITY TYPE: ICD-10-CM

## 2021-02-22 DIAGNOSIS — R53.83 LOW ENERGY: ICD-10-CM

## 2021-02-22 RX ORDER — PHENTERMINE HYDROCHLORIDE 37.5 MG/1
TABLET ORAL
Qty: 30 TABLET | Refills: 0 | Status: SHIPPED | OUTPATIENT
Start: 2021-02-22 | End: 2021-03-22 | Stop reason: SDUPTHER

## 2021-02-22 NOTE — TELEPHONE ENCOUNTER
----- Message from Patito Briscoe sent at 2/21/2021  7:59 AM PST -----  Regarding: Non-Urgent Medical Question  Contact: [phone]  Hello.    Can I get my prescription refilled for phentermine one more time?  My work schedule is conflicting with your appointment availability. I should be free on Mondays in a couple of weeks. I will make an appointment.  Since I cannot yet schedule my vaccine, I would prefer a virtual appointment.  I am hoping that my previous history with asthma, bronchitis and pneumonia can hopefully get me on the list sooner.    Thank you.

## 2021-02-22 NOTE — TELEPHONE ENCOUNTER
----- Message from Patito Briscoe sent at 2/22/2021  1:04 PM PST -----  Regarding: Non-Urgent Medical Question  Contact: [phone]  Hello there.  I got an appointment scheduled for March 22nd.  I need to get a refill of my phentermine for March.  I have already made a request through the [facility] on [address] [place] [address] in [facility].  Can you fill it until I can see you March 22nd?  I have one pill for tomorrow, and that's it.     Thanks.  Patito

## 2021-02-22 NOTE — TELEPHONE ENCOUNTER
Received request via: Patient    Was the patient seen in the last year in this department? Yes  9/9/20  Does the patient have an active prescription (recently filled or refills available) for medication(s) requested? No

## 2021-03-22 ENCOUNTER — TELEMEDICINE (OUTPATIENT)
Dept: MEDICAL GROUP | Facility: LAB | Age: 46
End: 2021-03-22
Payer: COMMERCIAL

## 2021-03-22 VITALS — BODY MASS INDEX: 38.62 KG/M2 | HEIGHT: 63 IN | WEIGHT: 218 LBS

## 2021-03-22 DIAGNOSIS — E66.9 CLASS 2 OBESITY WITHOUT SERIOUS COMORBIDITY IN ADULT, UNSPECIFIED BMI, UNSPECIFIED OBESITY TYPE: ICD-10-CM

## 2021-03-22 DIAGNOSIS — R53.83 LOW ENERGY: ICD-10-CM

## 2021-03-22 PROCEDURE — 99213 OFFICE O/P EST LOW 20 MIN: CPT | Mod: 95,CR | Performed by: NURSE PRACTITIONER

## 2021-03-22 RX ORDER — PHENTERMINE HYDROCHLORIDE 37.5 MG/1
TABLET ORAL
Qty: 90 TABLET | Refills: 0 | Status: SHIPPED | OUTPATIENT
Start: 2021-03-22 | End: 2022-06-29 | Stop reason: SDUPTHER

## 2021-03-22 ASSESSMENT — PATIENT HEALTH QUESTIONNAIRE - PHQ9: CLINICAL INTERPRETATION OF PHQ2 SCORE: 0

## 2021-03-22 NOTE — PROGRESS NOTES
"Telemedicine: Established Patient   This evaluation was conducted via Zoom using secure and encrypted videoconferencing technology. The patient was in a private location in the state of [place].    The patient's identity was confirmed and verbal consent was obtained for this virtual visit.    Subjective:   CC:   Chief Complaint   Patient presents with   • Weight Check   • Medication Refill       Patito is a 45 y.o. female presenting for evaluation and management of:    Obesity:  Chronic issue.  Has been able to loose 5 lbs and keep this off since 10/2020.  Wants to stay on phentermine - helps give her a \"kick,\" to keep trying to loose weight.  Denies any negative side effects of phentermine, in particular she denies panic attacks / anxiety / heart palpitations.  Sleeps well at night.  Overall feeling great.      ROS  Negative except for HPI    Allergies   Allergen Reactions   • Sulfa Drugs Hives     Internal and external   • Tape      Blisters, even with paper tape  tegaderm is OK       Current medicines (including changes today)  Current Outpatient Medications   Medication Sig Dispense Refill   • Calcium-Vitamin D ([facility] CALCIUM-600/VIT D PO) Take  by mouth.     • phentermine (ADIPEX-P) 37.5 MG tablet TAKE 1 TABLET BY MOUTH ONCE DAILY IN THE MORNING BEFORE BREAKFAST 30 tablet 0   • omeprazole (PRILOSEC) 20 MG delayed-release capsule TAKE ONE CAPSULE BY MOUTH ONCE DAILY (Patient taking differently: Take 20 mg by mouth every bedtime.) 30 Cap 0     No current facility-administered medications for this visit.       Patient Active Problem List    Diagnosis Date Noted   • Asthma, exercise induced 03/04/2013     Priority: Low   • Finger mass, left 06/03/2020   • Sleep disturbance 07/08/2019   • Class 2 obesity without serious comorbidity in adult 05/21/2018   • Generalized headaches 02/06/2017   • Gastroesophageal reflux disease without esophagitis 04/25/2016   • Multiple joint pain 04/25/2016   • Other fatigue 04/25/2016 " "  • Diarrhea 04/25/2016       Family History   Problem Relation Age of Onset   • Diabetes Mother    • Heart Disease Father    • Hypertension Father    • Stroke Father         from an TBI   • Diabetes Paternal Uncle    • Diabetes Maternal Grandfather        She  has a past medical history of Asthma, exercise induced (3/4/2013) and Heart burn. She also has no past medical history of Diabetes.  She  has a past surgical history that includes tonsillectomy; clavicle orif; endometrial ablation; mammoplasty augmentation; irrigation & debridement ortho (Left, 6/3/2020); and mass excision ortho (Left, 6/3/2020).       Objective:   Ht 1.6 m (5' 3\")   Wt 98.9 kg (218 lb)   BMI 38.62 kg/m²     Physical Exam  Gen: NAD  Resp: nonlabored.  Able to speak in full sentences  Psy: pleasant / cooperative.   Neuro:  Alert and oriented x 3  Assessment and Plan:   The following treatment plan was discussed:   \"  1. Class 2 obesity without serious comorbidity in adult, unspecified BMI, unspecified obesity type  phentermine (ADIPEX-P) 37.5 MG tablet   2. Low energy  phentermine (ADIPEX-P) 37.5 MG tablet   \"  Responding well to phentermine and would like to continue on this medication for discussed potential side effects such as insomnia, heart palpitations, anxiety, all of which she denies having any symptoms of.  Reviewed her  profile which shows her last refill of phentermine was 1 month ago.  Also reminded her that she is due for a Pap smear and this was scheduled for her today.  She may have another 3 months of phentermine in the interim prior to her in person visit in 6 months for a Pap smear.  In prescribing controlled substances to this patient, I certify that I have obtained and reviewed the medical history of Patito Briscoe. I have also made a good leslie effort to obtain applicable records from other providers who have treated the patient and records did not demonstrate any increased risk of substance abuse that would " prevent me from prescribing controlled substances.     I have conducted a physical exam and documented it. I have reviewed Ms. Briscoe’s prescription history as maintained by the [place] Prescription Monitoring Program.

## 2021-06-13 ENCOUNTER — OFFICE VISIT (OUTPATIENT)
Dept: URGENT CARE | Facility: CLINIC | Age: 46
End: 2021-06-13
Payer: COMMERCIAL

## 2021-06-13 VITALS
WEIGHT: 227.6 LBS | SYSTOLIC BLOOD PRESSURE: 142 MMHG | BODY MASS INDEX: 40.33 KG/M2 | TEMPERATURE: 97.5 F | OXYGEN SATURATION: 100 % | HEIGHT: 63 IN | DIASTOLIC BLOOD PRESSURE: 100 MMHG | HEART RATE: 84 BPM | RESPIRATION RATE: 14 BRPM

## 2021-06-13 DIAGNOSIS — R03.0 ELEVATED BLOOD PRESSURE READING IN OFFICE WITHOUT DIAGNOSIS OF HYPERTENSION: ICD-10-CM

## 2021-06-13 PROCEDURE — 93000 ELECTROCARDIOGRAM COMPLETE: CPT | Performed by: NURSE PRACTITIONER

## 2021-06-13 PROCEDURE — 99214 OFFICE O/P EST MOD 30 MIN: CPT | Performed by: NURSE PRACTITIONER

## 2021-06-13 ASSESSMENT — ENCOUNTER SYMPTOMS
MYALGIAS: 0
CLAUDICATION: 0
ORTHOPNEA: 0
HEADACHES: 0
PND: 0
NAUSEA: 0
FEVER: 0
CHILLS: 0
VOMITING: 0
PALPITATIONS: 0

## 2021-06-13 NOTE — PATIENT INSTRUCTIONS
"Hypertension, Adult  High blood pressure (hypertension) is when the force of blood pumping through the arteries is too strong. The arteries are the blood vessels that carry blood from the heart throughout the body. Hypertension forces the heart to work harder to pump blood and may cause arteries to become narrow or stiff. Untreated or uncontrolled hypertension can cause a heart attack, heart failure, a stroke, kidney disease, and other problems.  A blood pressure reading consists of a higher number over a lower number. Ideally, your blood pressure should be below 120/80. The first (\"top\") number is called the systolic pressure. It is a measure of the pressure in your arteries as your heart beats. The second (\"bottom\") number is called the diastolic pressure. It is a measure of the pressure in your arteries as the heart relaxes.  What are the causes?  The exact cause of this condition is not known. There are some conditions that result in or are related to high blood pressure.  What increases the risk?  Some risk factors for high blood pressure are under your control. The following factors may make you more likely to develop this condition:  · Smoking.  · Having type 2 diabetes mellitus, high cholesterol, or both.  · Not getting enough exercise or physical activity.  · Being overweight.  · Having too much fat, sugar, calories, or salt (sodium) in your diet.  · Drinking too much alcohol.  Some risk factors for high blood pressure may be difficult or impossible to change. Some of these factors include:  · Having chronic kidney disease.  · Having a family history of high blood pressure.  · Age. Risk increases with age.  · Race. You may be at higher risk if you are .  · Gender. Men are at higher risk than women before age 45. After age 65, women are at higher risk than men.  · Having obstructive sleep apnea.  · Stress.  What are the signs or symptoms?  High blood pressure may not cause symptoms. Very high " blood pressure (hypertensive crisis) may cause:  · Headache.  · Anxiety.  · Shortness of breath.  · Nosebleed.  · Nausea and vomiting.  · Vision changes.  · Severe chest pain.  · Seizures.  How is this diagnosed?  This condition is diagnosed by measuring your blood pressure while you are seated, with your arm resting on a flat surface, your legs uncrossed, and your feet flat on the floor. The cuff of the blood pressure monitor will be placed directly against the skin of your upper arm at the level of your heart. It should be measured at least twice using the same arm. Certain conditions can cause a difference in blood pressure between your right and left arms.  Certain factors can cause blood pressure readings to be lower or higher than normal for a short period of time:  · When your blood pressure is higher when you are in a health care provider's office than when you are at home, this is called white coat hypertension. Most people with this condition do not need medicines.  · When your blood pressure is higher at home than when you are in a health care provider's office, this is called masked hypertension. Most people with this condition may need medicines to control blood pressure.  If you have a high blood pressure reading during one visit or you have normal blood pressure with other risk factors, you may be asked to:  · Return on a different day to have your blood pressure checked again.  · Monitor your blood pressure at home for 1 week or longer.  If you are diagnosed with hypertension, you may have other blood or imaging tests to help your health care provider understand your overall risk for other conditions.  How is this treated?  This condition is treated by making healthy lifestyle changes, such as eating healthy foods, exercising more, and reducing your alcohol intake. Your health care provider may prescribe medicine if lifestyle changes are not enough to get your blood pressure under control, and  if:  · Your systolic blood pressure is above 130.  · Your diastolic blood pressure is above 80.  Your personal target blood pressure may vary depending on your medical conditions, your age, and other factors.  Follow these instructions at home:  Eating and drinking    · Eat a diet that is high in fiber and potassium, and low in sodium, added sugar, and fat. An example eating plan is called the DASH (Dietary Approaches to Stop Hypertension) diet. To eat this way:  ? Eat plenty of fresh fruits and vegetables. Try to fill one half of your plate at each meal with fruits and vegetables.  ? Eat whole grains, such as whole-wheat pasta, brown rice, or whole-grain bread. Fill about one fourth of your plate with whole grains.  ? Eat or drink low-fat dairy products, such as skim milk or low-fat yogurt.  ? Avoid fatty cuts of meat, processed or cured meats, and poultry with skin. Fill about one fourth of your plate with lean proteins, such as fish, chicken without skin, beans, eggs, or tofu.  ? Avoid pre-made and processed foods. These tend to be higher in sodium, added sugar, and fat.  · Reduce your daily sodium intake. Most people with hypertension should eat less than 1,500 mg of sodium a day.  · Do not drink alcohol if:  ? Your health care provider tells you not to drink.  ? You are pregnant, may be pregnant, or are planning to become pregnant.  · If you drink alcohol:  ? Limit how much you use to:  § 0-1 drink a day for women.  § 0-2 drinks a day for men.  ? Be aware of how much alcohol is in your drink. In the [place] one drink equals one 12 oz bottle of beer (355 mL), one 5 oz glass of wine (148 mL), or one 1½ oz glass of hard liquor (44 mL).  Lifestyle    · Work with your health care provider to maintain a healthy body weight or to lose weight. Ask what an ideal weight is for you.  · Get at least 30 minutes of exercise most days of the week. Activities may include walking, swimming, or biking.  · Include exercise to  strengthen your muscles (resistance exercise), such as Pilates or lifting weights, as part of your weekly exercise routine. Try to do these types of exercises for 30 minutes at least 3 days a week.  · Do not use any products that contain nicotine or tobacco, such as cigarettes, e-cigarettes, and chewing tobacco. If you need help quitting, ask your health care provider.  · Monitor your blood pressure at home as told by your health care provider.  · Keep all follow-up visits as told by your health care provider. This is important.  Medicines  · Take over-the-counter and prescription medicines only as told by your health care provider. Follow directions carefully. Blood pressure medicines must be taken as prescribed.  · Do not skip doses of blood pressure medicine. Doing this puts you at risk for problems and can make the medicine less effective.  · Ask your health care provider about side effects or reactions to medicines that you should watch for.  Contact a health care provider if you:  · Think you are having a reaction to a medicine you are taking.  · Have headaches that keep coming back (recurring).  · Feel dizzy.  · Have swelling in your ankles.  · Have trouble with your vision.  Get help right away if you:  · Develop a severe headache or confusion.  · Have unusual weakness or numbness.  · Feel faint.  · Have severe pain in your chest or abdomen.  · Vomit repeatedly.  · Have trouble breathing.  Summary  · Hypertension is when the force of blood pumping through your arteries is too strong. If this condition is not controlled, it may put you at risk for serious complications.  · Your personal target blood pressure may vary depending on your medical conditions, your age, and other factors. For most people, a normal blood pressure is less than 120/80.  · Hypertension is treated with lifestyle changes, medicines, or a combination of both. Lifestyle changes include losing weight, eating a healthy, low-sodium diet,  exercising more, and limiting alcohol.  This information is not intended to replace advice given to you by your health care provider. Make sure you discuss any questions you have with your health care provider.  Document Released: 12/18/2006 Document Revised: 08/28/2019 Document Reviewed: 08/28/2019  Elsevier Patient Education © 2020 [employer]

## 2021-06-13 NOTE — PROGRESS NOTES
"Subjective:     Patito Briscoe is a 45 y.o. female who presents for Other (Not feeling well, skin feels hot and red, feeling off. Last week visiion barbara. Had a headache earlier.)      HPI  Pt presents for evaluation of a new problem. Patito is a pleasant 45-year-old female who presents to urgent care today with her  for complaints of elevated blood pressure reading at home.  She notes yesterday she developed flushing of her chest as well as feeling fatigued and \"off\".  She then decided to check her blood pressure at home.  Her blood pressure has been ranging from 160/80 to 180/100.  She has no previous history of hypertension.  She recently started working out again, has stopped drinking alcoholic beverages and has stopped using phentermine that was prescribed by her primary care physician.  She does not smoke and does not have family history of cardiovascular disease.  Her father does suffer from hypertension.  She denies any chest pain, pressure, tightness, headaches, nausea or elevated heart rate.  Negative for shortness of breath.    Review of Systems   Constitutional: Positive for malaise/fatigue. Negative for chills and fever.   Cardiovascular: Negative for chest pain, palpitations, orthopnea, claudication, leg swelling and PND.   Gastrointestinal: Negative for nausea and vomiting.   Musculoskeletal: Negative for myalgias.   Neurological: Negative for headaches.       PMH:   Past Medical History:   Diagnosis Date   • Asthma, exercise induced 3/4/2013   • Heart burn      ALLERGIES:   Allergies   Allergen Reactions   • Sulfa Drugs Hives     Internal and external   • Tape      Blisters, even with paper tape  tegaderm is OK     SURGHX:   Past Surgical History:   Procedure Laterality Date   • IRRIGATION & DEBRIDEMENT ORTHO Left 6/3/2020    Procedure: IRRIGATION AND DEBRIDEMENT, WOUND- FOR RING FINGER NAIL REMOVAL;  Surgeon: Guero Blankenship M.D.;  Location: SURGERY [facility];  Service: " Orthopedics   • MASS EXCISION ORTHO Left 6/3/2020    Procedure: EXCISION, MASS- SUBUNGUAL;  Surgeon: Guero Blankenship M.D.;  Location: SURGERY [facility];  Service: Orthopedics   • CLAVICLE ORIF     • ENDOMETRIAL ABLATION      2009   • MAMMOPLASTY AUGMENTATION      age 25   • TONSILLECTOMY       SOCHX:   Social History     Socioeconomic History   • Marital status:      Spouse name: Not on file   • Number of children: Not on file   • Years of education: Not on file   • Highest education level: Not on file   Occupational History   • Not on file   Tobacco Use   • Smoking status: Never Smoker   • Smokeless tobacco: Never Used   Vaping Use   • Vaping Use: Never used   Substance and Sexual Activity   • Alcohol use: Yes   • Drug use: No   • Sexual activity: Yes     Partners: Male     Birth control/protection: Surgical     Comment: ; 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; self employeed    Other Topics Concern   • Not on file   Social History Narrative   • Not on file     Social Determinants of Health     Financial Resource Strain:    • Difficulty of Paying Living Expenses:    Food Insecurity:    • Worried About Running Out of Food in the Last Year:    • Ran Out of Food in the Last Year:    Transportation Needs:    • Lack of Transportation (Medical):    • Lack of Transportation (Non-Medical):    Physical Activity:    • Days of Exercise per Week:    • Minutes of Exercise per Session:    Stress:    • Feeling of Stress :    Social Connections:    • Frequency of Communication with Friends and Family:    • Frequency of Social Gatherings with Friends and Family:    • Attends Sikhism Services:    • Active Member of Clubs or Organizations:    • Attends Club or Organization Meetings:    • Marital Status:    Intimate Partner Violence:    • Fear of Current or Ex-Partner:    • Emotionally Abused:    • Physically Abused:    • Sexually Abused:      FH:   Family History   Problem Relation Age of Onset   • Diabetes Mother    • Heart Disease  "Father    • Hypertension Father    • Stroke Father         from an TBI   • Diabetes Paternal Uncle    • Diabetes Maternal Grandfather          Objective:   /100 (BP Location: Left arm, Patient Position: Sitting, BP Cuff Size: Adult)   Pulse 84   Temp 36.4 °C (97.5 °F) (Temporal)   Resp 14   Ht 1.6 m (5' 3\")   Wt 103 kg (227 lb 9.6 oz)   SpO2 100%   BMI 40.32 kg/m²     Physical Exam  Vitals and nursing note reviewed.   Constitutional:       General: She is not in acute distress.     Appearance: Normal appearance. She is not ill-appearing.   HENT:      Head: Normocephalic and atraumatic.      Right Ear: External ear normal.      Left Ear: External ear normal.      Nose: No congestion or rhinorrhea.      Mouth/Throat:      Mouth: Mucous membranes are moist.   Eyes:      Extraocular Movements: Extraocular movements intact.      Pupils: Pupils are equal, round, and reactive to light.   Cardiovascular:      Rate and Rhythm: Normal rate and regular rhythm.      Pulses: Normal pulses.           Carotid pulses are 2+ on the right side and 2+ on the left side.       Radial pulses are 2+ on the right side and 2+ on the left side.        Posterior tibial pulses are 2+ on the right side and 2+ on the left side.      Heart sounds: Normal heart sounds. Heart sounds not distant. No murmur heard.     Pulmonary:      Effort: Pulmonary effort is normal.      Breath sounds: Normal breath sounds.   Abdominal:      General: Abdomen is flat. Bowel sounds are normal.      Palpations: Abdomen is soft.      Tenderness: There is no abdominal tenderness. There is no right CVA tenderness or left CVA tenderness.   Musculoskeletal:         General: Normal range of motion.      Cervical back: Normal range of motion and neck supple.      Right lower leg: No edema.      Left lower leg: No edema.   Skin:     General: Skin is warm and dry.      Capillary Refill: Capillary refill takes less than 2 seconds.   Neurological:      General: No " focal deficit present.      Mental Status: She is alert and oriented to person, place, and time. Mental status is at baseline.   Psychiatric:         Mood and Affect: Mood normal.         Behavior: Behavior normal.         Thought Content: Thought content normal.         Judgment: Judgment normal.       ECG: Normal sinus rhythm rate of 71  Assessment/Plan:   Assessment      1. Elevated blood pressure reading in office without diagnosis of hypertension     Her blood pressure in the clinic was elevated.  Her previous office visits with primary care were reviewed and there is no previous history of elevated blood pressure reading.  She is otherwise asymptomatic.  I recommended that she follow-up with primary care this week for further evaluation of high blood pressure.  Patient was educated to refrain from caffeine, alcohol and eat low-sodium diet.  Strict ER precautions given for worsening symptoms.  As this is her first documented elevated blood pressure reading, she was not placed on antihypertensive.  She and her  agreed to this plan of care today.  AVS handout given and reviewed with patient. Time spent evaluating this patient was at least 30 minutes and includes preparing for visit, counseling/education, exam and evaluation, obtaining history, independent interpretation and ordering labs/tests/procedures.    Pt educated on red flags and when to seek treatment back in ER or UC.

## 2021-06-23 ENCOUNTER — PATIENT MESSAGE (OUTPATIENT)
Dept: MEDICAL GROUP | Facility: LAB | Age: 46
End: 2021-06-23

## 2021-06-23 RX ORDER — HYDROCHLOROTHIAZIDE 25 MG/1
25 TABLET ORAL DAILY
Qty: 30 TABLET | Refills: 1 | Status: SHIPPED | OUTPATIENT
Start: 2021-06-23 | End: 2021-08-17

## 2021-06-30 ENCOUNTER — OFFICE VISIT (OUTPATIENT)
Dept: MEDICAL GROUP | Facility: LAB | Age: 46
End: 2021-06-30
Payer: COMMERCIAL

## 2021-06-30 VITALS
HEIGHT: 63 IN | RESPIRATION RATE: 12 BRPM | TEMPERATURE: 97.2 F | BODY MASS INDEX: 39.87 KG/M2 | DIASTOLIC BLOOD PRESSURE: 90 MMHG | OXYGEN SATURATION: 97 % | HEART RATE: 94 BPM | SYSTOLIC BLOOD PRESSURE: 138 MMHG | WEIGHT: 225 LBS

## 2021-06-30 DIAGNOSIS — R00.0 TACHYCARDIA: ICD-10-CM

## 2021-06-30 DIAGNOSIS — I10 ESSENTIAL HYPERTENSION: ICD-10-CM

## 2021-06-30 DIAGNOSIS — Z00.00 PREVENTATIVE HEALTH CARE: ICD-10-CM

## 2021-06-30 DIAGNOSIS — R06.02 SOB (SHORTNESS OF BREATH): ICD-10-CM

## 2021-06-30 PROCEDURE — 99214 OFFICE O/P EST MOD 30 MIN: CPT | Performed by: NURSE PRACTITIONER

## 2021-06-30 RX ORDER — LISINOPRIL 10 MG/1
10 TABLET ORAL DAILY
Qty: 30 TABLET | Refills: 5 | Status: SHIPPED | OUTPATIENT
Start: 2021-06-30 | End: 2021-07-06

## 2021-07-03 ENCOUNTER — HOSPITAL ENCOUNTER (OUTPATIENT)
Dept: LAB | Facility: MEDICAL CENTER | Age: 46
End: 2021-07-03
Attending: NURSE PRACTITIONER
Payer: COMMERCIAL

## 2021-07-03 DIAGNOSIS — Z00.00 PREVENTATIVE HEALTH CARE: ICD-10-CM

## 2021-07-03 LAB
ALBUMIN SERPL BCP-MCNC: 4.4 G/DL (ref 3.2–4.9)
ALBUMIN/GLOB SERPL: 1.4 G/DL
ALP SERPL-CCNC: 74 U/L (ref 30–99)
ALT SERPL-CCNC: 26 U/L (ref 2–50)
ANION GAP SERPL CALC-SCNC: 10 MMOL/L (ref 7–16)
AST SERPL-CCNC: 21 U/L (ref 12–45)
BASOPHILS # BLD AUTO: 0.2 % (ref 0–1.8)
BASOPHILS # BLD: 0.02 K/UL (ref 0–0.12)
BILIRUB SERPL-MCNC: 0.4 MG/DL (ref 0.1–1.5)
BUN SERPL-MCNC: 11 MG/DL (ref 8–22)
CALCIUM SERPL-MCNC: 9.2 MG/DL (ref 8.5–10.5)
CHLORIDE SERPL-SCNC: 99 MMOL/L (ref 96–112)
CHOLEST SERPL-MCNC: 240 MG/DL (ref 100–199)
CO2 SERPL-SCNC: 27 MMOL/L (ref 20–33)
CREAT SERPL-MCNC: 0.67 MG/DL (ref 0.5–1.4)
EOSINOPHIL # BLD AUTO: 0.16 K/UL (ref 0–0.51)
EOSINOPHIL NFR BLD: 1.7 % (ref 0–6.9)
ERYTHROCYTE [DISTWIDTH] IN BLOOD BY AUTOMATED COUNT: 41.9 FL (ref 35.9–50)
EST. AVERAGE GLUCOSE BLD GHB EST-MCNC: 108 MG/DL
FASTING STATUS PATIENT QL REPORTED: NORMAL
GLOBULIN SER CALC-MCNC: 3.1 G/DL (ref 1.9–3.5)
GLUCOSE SERPL-MCNC: 92 MG/DL (ref 65–99)
HBA1C MFR BLD: 5.4 % (ref 4–5.6)
HCT VFR BLD AUTO: 46.3 % (ref 37–47)
HDLC SERPL-MCNC: 50 MG/DL
HGB BLD-MCNC: 15.2 G/DL (ref 12–16)
IMM GRANULOCYTES # BLD AUTO: 0.04 K/UL (ref 0–0.11)
IMM GRANULOCYTES NFR BLD AUTO: 0.4 % (ref 0–0.9)
LDLC SERPL CALC-MCNC: 159 MG/DL
LYMPHOCYTES # BLD AUTO: 1.87 K/UL (ref 1–4.8)
LYMPHOCYTES NFR BLD: 19.7 % (ref 22–41)
MCH RBC QN AUTO: 31 PG (ref 27–33)
MCHC RBC AUTO-ENTMCNC: 32.8 G/DL (ref 33.6–35)
MCV RBC AUTO: 94.3 FL (ref 81.4–97.8)
MONOCYTES # BLD AUTO: 0.5 K/UL (ref 0–0.85)
MONOCYTES NFR BLD AUTO: 5.3 % (ref 0–13.4)
NEUTROPHILS # BLD AUTO: 6.92 K/UL (ref 2–7.15)
NEUTROPHILS NFR BLD: 72.7 % (ref 44–72)
NRBC # BLD AUTO: 0 K/UL
NRBC BLD-RTO: 0 /100 WBC
PLATELET # BLD AUTO: 236 K/UL (ref 164–446)
PMV BLD AUTO: 12 FL (ref 9–12.9)
POTASSIUM SERPL-SCNC: 3.8 MMOL/L (ref 3.6–5.5)
PROT SERPL-MCNC: 7.5 G/DL (ref 6–8.2)
RBC # BLD AUTO: 4.91 M/UL (ref 4.2–5.4)
SODIUM SERPL-SCNC: 136 MMOL/L (ref 135–145)
TRIGL SERPL-MCNC: 153 MG/DL (ref 0–149)
TSH SERPL DL<=0.005 MIU/L-ACNC: 2.56 UIU/ML (ref 0.38–5.33)
WBC # BLD AUTO: 9.5 K/UL (ref 4.8–10.8)

## 2021-07-03 PROCEDURE — 83036 HEMOGLOBIN GLYCOSYLATED A1C: CPT

## 2021-07-03 PROCEDURE — 36415 COLL VENOUS BLD VENIPUNCTURE: CPT

## 2021-07-03 PROCEDURE — 85025 COMPLETE CBC W/AUTO DIFF WBC: CPT

## 2021-07-03 PROCEDURE — 80053 COMPREHEN METABOLIC PANEL: CPT

## 2021-07-03 PROCEDURE — 84443 ASSAY THYROID STIM HORMONE: CPT

## 2021-07-03 PROCEDURE — 80061 LIPID PANEL: CPT

## 2021-07-06 DIAGNOSIS — I10 ESSENTIAL HYPERTENSION: ICD-10-CM

## 2021-07-06 RX ORDER — LISINOPRIL 20 MG/1
20 TABLET ORAL DAILY
Qty: 30 TABLET | Refills: 5
Start: 2021-07-06 | End: 2021-08-17

## 2021-07-12 ENCOUNTER — PATIENT MESSAGE (OUTPATIENT)
Dept: MEDICAL GROUP | Facility: LAB | Age: 46
End: 2021-07-12

## 2021-07-13 RX ORDER — LISINOPRIL AND HYDROCHLOROTHIAZIDE 25; 20 MG/1; MG/1
1 TABLET ORAL DAILY
Qty: 90 TABLET | Refills: 3 | Status: SHIPPED | OUTPATIENT
Start: 2021-07-13 | End: 2021-09-13

## 2021-07-22 DIAGNOSIS — R23.2 HOT FLASHES: ICD-10-CM

## 2021-07-22 DIAGNOSIS — R68.82 LOW LIBIDO: ICD-10-CM

## 2021-08-17 ENCOUNTER — APPOINTMENT (OUTPATIENT)
Dept: RADIOLOGY | Facility: MEDICAL CENTER | Age: 46
End: 2021-08-17
Attending: EMERGENCY MEDICINE
Payer: COMMERCIAL

## 2021-08-17 ENCOUNTER — HOSPITAL ENCOUNTER (OUTPATIENT)
Facility: MEDICAL CENTER | Age: 46
End: 2021-08-20
Attending: EMERGENCY MEDICINE | Admitting: INTERNAL MEDICINE
Payer: COMMERCIAL

## 2021-08-17 ENCOUNTER — APPOINTMENT (OUTPATIENT)
Dept: RADIOLOGY | Facility: MEDICAL CENTER | Age: 46
End: 2021-08-17
Attending: NURSE PRACTITIONER
Payer: COMMERCIAL

## 2021-08-17 DIAGNOSIS — K80.20 CALCULUS OF GALLBLADDER WITHOUT CHOLECYSTITIS WITHOUT OBSTRUCTION: ICD-10-CM

## 2021-08-17 DIAGNOSIS — R10.13 EPIGASTRIC PAIN: ICD-10-CM

## 2021-08-17 DIAGNOSIS — G89.18 ACUTE POST-OPERATIVE PAIN: ICD-10-CM

## 2021-08-17 DIAGNOSIS — R07.9 ACUTE CHEST PAIN: ICD-10-CM

## 2021-08-17 DIAGNOSIS — R52 INTRACTABLE PAIN: ICD-10-CM

## 2021-08-17 PROBLEM — I10 HYPERTENSION: Status: ACTIVE | Noted: 2021-08-17

## 2021-08-17 PROBLEM — R07.89 OTHER CHEST PAIN: Status: ACTIVE | Noted: 2021-08-17

## 2021-08-17 LAB
ALBUMIN SERPL BCP-MCNC: 4.2 G/DL (ref 3.2–4.9)
ALBUMIN/GLOB SERPL: 1.4 G/DL
ALP SERPL-CCNC: 80 U/L (ref 30–99)
ALT SERPL-CCNC: 23 U/L (ref 2–50)
AMPHET UR QL SCN: NEGATIVE
ANION GAP SERPL CALC-SCNC: 14 MMOL/L (ref 7–16)
AST SERPL-CCNC: 19 U/L (ref 12–45)
BARBITURATES UR QL SCN: NEGATIVE
BASOPHILS # BLD AUTO: 0.5 % (ref 0–1.8)
BASOPHILS # BLD: 0.04 K/UL (ref 0–0.12)
BENZODIAZ UR QL SCN: NEGATIVE
BILIRUB SERPL-MCNC: 0.4 MG/DL (ref 0.1–1.5)
BUN SERPL-MCNC: 10 MG/DL (ref 8–22)
BZE UR QL SCN: NEGATIVE
CALCIUM SERPL-MCNC: 9.1 MG/DL (ref 8.5–10.5)
CANNABINOIDS UR QL SCN: POSITIVE
CHLORIDE SERPL-SCNC: 100 MMOL/L (ref 96–112)
CO2 SERPL-SCNC: 24 MMOL/L (ref 20–33)
CREAT SERPL-MCNC: 0.68 MG/DL (ref 0.5–1.4)
D DIMER PPP IA.FEU-MCNC: <0.27 UG/ML (FEU) (ref 0–0.5)
EKG IMPRESSION: NORMAL
EKG IMPRESSION: NORMAL
EOSINOPHIL # BLD AUTO: 0.23 K/UL (ref 0–0.51)
EOSINOPHIL NFR BLD: 2.9 % (ref 0–6.9)
ERYTHROCYTE [DISTWIDTH] IN BLOOD BY AUTOMATED COUNT: 41.1 FL (ref 35.9–50)
GLOBULIN SER CALC-MCNC: 3 G/DL (ref 1.9–3.5)
GLUCOSE SERPL-MCNC: 110 MG/DL (ref 65–99)
HCG SERPL QL: NEGATIVE
HCT VFR BLD AUTO: 42.8 % (ref 37–47)
HGB BLD-MCNC: 14.5 G/DL (ref 12–16)
IMM GRANULOCYTES # BLD AUTO: 0.03 K/UL (ref 0–0.11)
IMM GRANULOCYTES NFR BLD AUTO: 0.4 % (ref 0–0.9)
LIPASE SERPL-CCNC: 26 U/L (ref 11–82)
LYMPHOCYTES # BLD AUTO: 1.83 K/UL (ref 1–4.8)
LYMPHOCYTES NFR BLD: 23.2 % (ref 22–41)
MAGNESIUM SERPL-MCNC: 1.6 MG/DL (ref 1.5–2.5)
MCH RBC QN AUTO: 30.9 PG (ref 27–33)
MCHC RBC AUTO-ENTMCNC: 33.9 G/DL (ref 33.6–35)
MCV RBC AUTO: 91.3 FL (ref 81.4–97.8)
METHADONE UR QL SCN: NEGATIVE
MONOCYTES # BLD AUTO: 0.45 K/UL (ref 0–0.85)
MONOCYTES NFR BLD AUTO: 5.7 % (ref 0–13.4)
NEUTROPHILS # BLD AUTO: 5.31 K/UL (ref 2–7.15)
NEUTROPHILS NFR BLD: 67.3 % (ref 44–72)
NRBC # BLD AUTO: 0 K/UL
NRBC BLD-RTO: 0 /100 WBC
OPIATES UR QL SCN: NEGATIVE
OXYCODONE UR QL SCN: NEGATIVE
PCP UR QL SCN: NEGATIVE
PLATELET # BLD AUTO: 192 K/UL (ref 164–446)
PMV BLD AUTO: 10.7 FL (ref 9–12.9)
POTASSIUM SERPL-SCNC: 3.9 MMOL/L (ref 3.6–5.5)
PROPOXYPH UR QL SCN: NEGATIVE
PROT SERPL-MCNC: 7.2 G/DL (ref 6–8.2)
RBC # BLD AUTO: 4.69 M/UL (ref 4.2–5.4)
SARS-COV+SARS-COV-2 AG RESP QL IA.RAPID: NOTDETECTED
SODIUM SERPL-SCNC: 138 MMOL/L (ref 135–145)
SPECIMEN SOURCE: NORMAL
TROPONIN T SERPL-MCNC: 10 NG/L (ref 6–19)
TROPONIN T SERPL-MCNC: 10 NG/L (ref 6–19)
WBC # BLD AUTO: 7.9 K/UL (ref 4.8–10.8)

## 2021-08-17 PROCEDURE — 85025 COMPLETE CBC W/AUTO DIFF WBC: CPT

## 2021-08-17 PROCEDURE — 700117 HCHG RX CONTRAST REV CODE 255: Performed by: EMERGENCY MEDICINE

## 2021-08-17 PROCEDURE — 85379 FIBRIN DEGRADATION QUANT: CPT

## 2021-08-17 PROCEDURE — 700111 HCHG RX REV CODE 636 W/ 250 OVERRIDE (IP): Performed by: NURSE PRACTITIONER

## 2021-08-17 PROCEDURE — 96376 TX/PRO/DX INJ SAME DRUG ADON: CPT

## 2021-08-17 PROCEDURE — 76705 ECHO EXAM OF ABDOMEN: CPT

## 2021-08-17 PROCEDURE — 99285 EMERGENCY DEPT VISIT HI MDM: CPT

## 2021-08-17 PROCEDURE — 700102 HCHG RX REV CODE 250 W/ 637 OVERRIDE(OP): Performed by: EMERGENCY MEDICINE

## 2021-08-17 PROCEDURE — 700111 HCHG RX REV CODE 636 W/ 250 OVERRIDE (IP): Performed by: EMERGENCY MEDICINE

## 2021-08-17 PROCEDURE — 83735 ASSAY OF MAGNESIUM: CPT

## 2021-08-17 PROCEDURE — 700102 HCHG RX REV CODE 250 W/ 637 OVERRIDE(OP): Performed by: NURSE PRACTITIONER

## 2021-08-17 PROCEDURE — 96375 TX/PRO/DX INJ NEW DRUG ADDON: CPT

## 2021-08-17 PROCEDURE — 93010 ELECTROCARDIOGRAM REPORT: CPT | Performed by: INTERNAL MEDICINE

## 2021-08-17 PROCEDURE — 96374 THER/PROPH/DIAG INJ IV PUSH: CPT

## 2021-08-17 PROCEDURE — A9270 NON-COVERED ITEM OR SERVICE: HCPCS | Performed by: NURSE PRACTITIONER

## 2021-08-17 PROCEDURE — G0378 HOSPITAL OBSERVATION PER HR: HCPCS

## 2021-08-17 PROCEDURE — A9270 NON-COVERED ITEM OR SERVICE: HCPCS | Performed by: EMERGENCY MEDICINE

## 2021-08-17 PROCEDURE — 87426 SARSCOV CORONAVIRUS AG IA: CPT

## 2021-08-17 PROCEDURE — 84484 ASSAY OF TROPONIN QUANT: CPT

## 2021-08-17 PROCEDURE — 84703 CHORIONIC GONADOTROPIN ASSAY: CPT

## 2021-08-17 PROCEDURE — 71045 X-RAY EXAM CHEST 1 VIEW: CPT

## 2021-08-17 PROCEDURE — 83690 ASSAY OF LIPASE: CPT

## 2021-08-17 PROCEDURE — 80053 COMPREHEN METABOLIC PANEL: CPT

## 2021-08-17 PROCEDURE — 99220 PR INITIAL OBSERVATION CARE,LEVL III: CPT | Performed by: INTERNAL MEDICINE

## 2021-08-17 PROCEDURE — 74175 CTA ABDOMEN W/CONTRAST: CPT

## 2021-08-17 PROCEDURE — 93005 ELECTROCARDIOGRAM TRACING: CPT

## 2021-08-17 PROCEDURE — 93005 ELECTROCARDIOGRAM TRACING: CPT | Performed by: EMERGENCY MEDICINE

## 2021-08-17 PROCEDURE — 96372 THER/PROPH/DIAG INJ SC/IM: CPT

## 2021-08-17 PROCEDURE — 80307 DRUG TEST PRSMV CHEM ANLYZR: CPT

## 2021-08-17 RX ORDER — PROMETHAZINE HYDROCHLORIDE 25 MG/1
12.5-25 TABLET ORAL EVERY 4 HOURS PRN
Status: DISCONTINUED | OUTPATIENT
Start: 2021-08-17 | End: 2021-08-20 | Stop reason: HOSPADM

## 2021-08-17 RX ORDER — ASPIRIN 300 MG/1
300 SUPPOSITORY RECTAL DAILY
Status: DISCONTINUED | OUTPATIENT
Start: 2021-08-17 | End: 2021-08-17

## 2021-08-17 RX ORDER — PROCHLORPERAZINE EDISYLATE 5 MG/ML
5-10 INJECTION INTRAMUSCULAR; INTRAVENOUS EVERY 4 HOURS PRN
Status: DISCONTINUED | OUTPATIENT
Start: 2021-08-17 | End: 2021-08-20 | Stop reason: HOSPADM

## 2021-08-17 RX ORDER — OMEPRAZOLE 20 MG/1
20 CAPSULE, DELAYED RELEASE ORAL DAILY
Status: DISCONTINUED | OUTPATIENT
Start: 2021-08-17 | End: 2021-08-20 | Stop reason: HOSPADM

## 2021-08-17 RX ORDER — KETOROLAC TROMETHAMINE 30 MG/ML
30 INJECTION, SOLUTION INTRAMUSCULAR; INTRAVENOUS EVERY 4 HOURS PRN
Status: DISPENSED | OUTPATIENT
Start: 2021-08-17 | End: 2021-08-18

## 2021-08-17 RX ORDER — BISACODYL 10 MG
10 SUPPOSITORY, RECTAL RECTAL
Status: DISCONTINUED | OUTPATIENT
Start: 2021-08-17 | End: 2021-08-20 | Stop reason: HOSPADM

## 2021-08-17 RX ORDER — KETOROLAC TROMETHAMINE 30 MG/ML
30 INJECTION, SOLUTION INTRAMUSCULAR; INTRAVENOUS EVERY 6 HOURS PRN
Status: DISCONTINUED | OUTPATIENT
Start: 2021-08-17 | End: 2021-08-17

## 2021-08-17 RX ORDER — ACETAMINOPHEN 325 MG/1
650 TABLET ORAL EVERY 6 HOURS PRN
Status: DISCONTINUED | OUTPATIENT
Start: 2021-08-17 | End: 2021-08-20 | Stop reason: HOSPADM

## 2021-08-17 RX ORDER — CLONIDINE HYDROCHLORIDE 0.1 MG/1
0.1 TABLET ORAL EVERY 6 HOURS PRN
Status: DISCONTINUED | OUTPATIENT
Start: 2021-08-17 | End: 2021-08-20 | Stop reason: HOSPADM

## 2021-08-17 RX ORDER — HYDROCHLOROTHIAZIDE 25 MG/1
25 TABLET ORAL
Status: DISCONTINUED | OUTPATIENT
Start: 2021-08-17 | End: 2021-08-20 | Stop reason: HOSPADM

## 2021-08-17 RX ORDER — ONDANSETRON 2 MG/ML
4 INJECTION INTRAMUSCULAR; INTRAVENOUS EVERY 4 HOURS PRN
Status: DISCONTINUED | OUTPATIENT
Start: 2021-08-17 | End: 2021-08-20 | Stop reason: HOSPADM

## 2021-08-17 RX ORDER — ASPIRIN 81 MG/1
324 TABLET, CHEWABLE ORAL DAILY
Status: DISCONTINUED | OUTPATIENT
Start: 2021-08-17 | End: 2021-08-17

## 2021-08-17 RX ORDER — ASPIRIN 325 MG
325 TABLET ORAL DAILY
Status: DISCONTINUED | OUTPATIENT
Start: 2021-08-17 | End: 2021-08-17

## 2021-08-17 RX ORDER — ONDANSETRON 4 MG/1
4 TABLET, ORALLY DISINTEGRATING ORAL EVERY 4 HOURS PRN
Status: DISCONTINUED | OUTPATIENT
Start: 2021-08-17 | End: 2021-08-20 | Stop reason: HOSPADM

## 2021-08-17 RX ORDER — AMINOPHYLLINE 25 MG/ML
100 INJECTION, SOLUTION INTRAVENOUS
Status: DISCONTINUED | OUTPATIENT
Start: 2021-08-17 | End: 2021-08-20 | Stop reason: HOSPADM

## 2021-08-17 RX ORDER — LISINOPRIL 20 MG/1
20 TABLET ORAL
Status: DISCONTINUED | OUTPATIENT
Start: 2021-08-17 | End: 2021-08-20 | Stop reason: HOSPADM

## 2021-08-17 RX ORDER — HYDROMORPHONE HYDROCHLORIDE 1 MG/ML
1 INJECTION, SOLUTION INTRAMUSCULAR; INTRAVENOUS; SUBCUTANEOUS ONCE
Status: COMPLETED | OUTPATIENT
Start: 2021-08-17 | End: 2021-08-17

## 2021-08-17 RX ORDER — POLYETHYLENE GLYCOL 3350 17 G/17G
1 POWDER, FOR SOLUTION ORAL
Status: DISCONTINUED | OUTPATIENT
Start: 2021-08-17 | End: 2021-08-20 | Stop reason: HOSPADM

## 2021-08-17 RX ORDER — ENALAPRILAT 1.25 MG/ML
1.25 INJECTION INTRAVENOUS EVERY 6 HOURS PRN
Status: DISCONTINUED | OUTPATIENT
Start: 2021-08-17 | End: 2021-08-20 | Stop reason: HOSPADM

## 2021-08-17 RX ORDER — PROMETHAZINE HYDROCHLORIDE 25 MG/1
12.5-25 SUPPOSITORY RECTAL EVERY 4 HOURS PRN
Status: DISCONTINUED | OUTPATIENT
Start: 2021-08-17 | End: 2021-08-20 | Stop reason: HOSPADM

## 2021-08-17 RX ORDER — ONDANSETRON 2 MG/ML
4 INJECTION INTRAMUSCULAR; INTRAVENOUS ONCE
Status: COMPLETED | OUTPATIENT
Start: 2021-08-17 | End: 2021-08-17

## 2021-08-17 RX ORDER — AMOXICILLIN 250 MG
2 CAPSULE ORAL 2 TIMES DAILY
Status: DISCONTINUED | OUTPATIENT
Start: 2021-08-17 | End: 2021-08-20 | Stop reason: HOSPADM

## 2021-08-17 RX ORDER — LABETALOL HYDROCHLORIDE 5 MG/ML
10 INJECTION, SOLUTION INTRAVENOUS EVERY 4 HOURS PRN
Status: DISCONTINUED | OUTPATIENT
Start: 2021-08-17 | End: 2021-08-20 | Stop reason: HOSPADM

## 2021-08-17 RX ORDER — LISINOPRIL AND HYDROCHLOROTHIAZIDE 25; 20 MG/1; MG/1
1 TABLET ORAL DAILY
Status: DISCONTINUED | OUTPATIENT
Start: 2021-08-17 | End: 2021-08-17

## 2021-08-17 RX ORDER — REGADENOSON 0.08 MG/ML
0.4 INJECTION, SOLUTION INTRAVENOUS
Status: DISCONTINUED | OUTPATIENT
Start: 2021-08-17 | End: 2021-08-20 | Stop reason: HOSPADM

## 2021-08-17 RX ADMIN — LIDOCAINE HYDROCHLORIDE 30 ML: 20 SOLUTION OROPHARYNGEAL at 08:22

## 2021-08-17 RX ADMIN — LISINOPRIL 20 MG: 20 TABLET ORAL at 17:02

## 2021-08-17 RX ADMIN — LIDOCAINE HYDROCHLORIDE 30 ML: 20 SOLUTION OROPHARYNGEAL at 22:03

## 2021-08-17 RX ADMIN — ONDANSETRON 4 MG: 2 INJECTION INTRAMUSCULAR; INTRAVENOUS at 07:30

## 2021-08-17 RX ADMIN — KETOROLAC TROMETHAMINE 30 MG: 30 INJECTION, SOLUTION INTRAMUSCULAR at 20:28

## 2021-08-17 RX ADMIN — OMEPRAZOLE 20 MG: 20 CAPSULE, DELAYED RELEASE ORAL at 17:03

## 2021-08-17 RX ADMIN — KETOROLAC TROMETHAMINE 30 MG: 30 INJECTION, SOLUTION INTRAMUSCULAR; INTRAVENOUS at 12:03

## 2021-08-17 RX ADMIN — ACETAMINOPHEN 650 MG: 325 TABLET, FILM COATED ORAL at 22:04

## 2021-08-17 RX ADMIN — ASPIRIN 324 MG: 81 TABLET, CHEWABLE ORAL at 11:05

## 2021-08-17 RX ADMIN — HYDROCHLOROTHIAZIDE 25 MG: 25 TABLET ORAL at 17:02

## 2021-08-17 RX ADMIN — DOCUSATE SODIUM 50 MG AND SENNOSIDES 8.6 MG 2 TABLET: 8.6; 5 TABLET, FILM COATED ORAL at 17:03

## 2021-08-17 RX ADMIN — KETOROLAC TROMETHAMINE 30 MG: 30 INJECTION, SOLUTION INTRAMUSCULAR at 16:56

## 2021-08-17 RX ADMIN — HYDROMORPHONE HYDROCHLORIDE 1 MG: 1 INJECTION, SOLUTION INTRAMUSCULAR; INTRAVENOUS; SUBCUTANEOUS at 07:30

## 2021-08-17 RX ADMIN — IOHEXOL 100 ML: 350 INJECTION, SOLUTION INTRAVENOUS at 06:42

## 2021-08-17 RX ADMIN — HYDROMORPHONE HYDROCHLORIDE 1 MG: 1 INJECTION, SOLUTION INTRAMUSCULAR; INTRAVENOUS; SUBCUTANEOUS at 09:52

## 2021-08-17 RX ADMIN — ENOXAPARIN SODIUM 40 MG: 40 INJECTION SUBCUTANEOUS at 11:05

## 2021-08-17 ASSESSMENT — ENCOUNTER SYMPTOMS
FEVER: 0
NEUROLOGICAL NEGATIVE: 1
PSYCHIATRIC NEGATIVE: 1
RESPIRATORY NEGATIVE: 1
MYALGIAS: 1
ABDOMINAL PAIN: 1
EYES NEGATIVE: 1
CHILLS: 0

## 2021-08-17 ASSESSMENT — LIFESTYLE VARIABLES
TOTAL SCORE: 0
EVER HAD A DRINK FIRST THING IN THE MORNING TO STEADY YOUR NERVES TO GET RID OF A HANGOVER: NO
HOW MANY TIMES IN THE PAST YEAR HAVE YOU HAD 5 OR MORE DRINKS IN A DAY: 0
ON A TYPICAL DAY WHEN YOU DRINK ALCOHOL HOW MANY DRINKS DO YOU HAVE: 0
DOES PATIENT WANT TO STOP DRINKING: NO
DO YOU DRINK ALCOHOL: YES
TOTAL SCORE: 0
CONSUMPTION TOTAL: NEGATIVE
TOTAL SCORE: 0
HAVE PEOPLE ANNOYED YOU BY CRITICIZING YOUR DRINKING: NO
ALCOHOL_USE: YES
EVER FELT BAD OR GUILTY ABOUT YOUR DRINKING: NO
HAVE YOU EVER FELT YOU SHOULD CUT DOWN ON YOUR DRINKING: NO
AVERAGE NUMBER OF DAYS PER WEEK YOU HAVE A DRINK CONTAINING ALCOHOL: 0

## 2021-08-17 ASSESSMENT — PAIN DESCRIPTION - PAIN TYPE
TYPE: ACUTE PAIN

## 2021-08-17 ASSESSMENT — PATIENT HEALTH QUESTIONNAIRE - PHQ9
1. LITTLE INTEREST OR PLEASURE IN DOING THINGS: NOT AT ALL
2. FEELING DOWN, DEPRESSED, IRRITABLE, OR HOPELESS: NOT AT ALL
SUM OF ALL RESPONSES TO PHQ9 QUESTIONS 1 AND 2: 0
SUM OF ALL RESPONSES TO PHQ9 QUESTIONS 1 AND 2: 0
2. FEELING DOWN, DEPRESSED, IRRITABLE, OR HOPELESS: NOT AT ALL
1. LITTLE INTEREST OR PLEASURE IN DOING THINGS: NOT AT ALL

## 2021-08-17 ASSESSMENT — COPD QUESTIONNAIRES
COPD SCREENING SCORE: 0
HAVE YOU SMOKED AT LEAST 100 CIGARETTES IN YOUR ENTIRE LIFE: NO/DON'T KNOW
DURING THE PAST 4 WEEKS HOW MUCH DID YOU FEEL SHORT OF BREATH: NONE/LITTLE OF THE TIME
DO YOU EVER COUGH UP ANY MUCUS OR PHLEGM?: NO/ONLY WITH OCCASIONAL COLDS OR INFECTIONS

## 2021-08-17 ASSESSMENT — FIBROSIS 4 INDEX: FIB4 SCORE: 0.77

## 2021-08-17 NOTE — ED PROVIDER NOTES
"ED Provider Note    Scribed for Bari Calloway M.D. by Wayne Connolly. 8/17/2021  6:05 AM    Primary care provider: [facility]  Means of arrival: Walk in  History obtained from: Patient  History limited by: None    CHIEF COMPLAINT  Chief Complaint   Patient presents with    Chest Pain     Woke up from sleep and it feels like my \"heart is ripping - pain everywhere\" - abdomen, back, center chest pain, right sided neck pain. feels like an elephant on them and unable to take deep breath.     HPI  Patito Briscoe is a 46 y.o. female who presents to the Emergency Department for evaluation of constant, sharp, severe sternal chest pain onset early this morning. She reports pain started in her right sided neck, then spread to her chest. She says pain awoke her from sleep. Nursing staff noted the patient stated \"my heart is ripping\". She admits to associated symptoms of radiating right sided abdominal pain, and radiating upper back pain, but denies fever. She denies history of similar pain. She says she initially believed her symptoms were secondary to indigestion, medicated with Prilosec with no alleviation of her symptoms. She notes she was recently diagnosed with hypertension 2 months ago, at which has been well controlled with lisinopril.     REVIEW OF SYSTEMS  Pertinent positives include chest pain, right sided neck pain, radiating right sided abdominal pain, and radiating upper back pain.   Pertinent negatives include no fever.    All other systems reviewed and negative. See HPI for further details.     PAST MEDICAL HISTORY   has a past medical history of Asthma, exercise induced (3/4/2013) and Heart burn.    SURGICAL HISTORY   has a past surgical history that includes tonsillectomy; clavicle orif; endometrial ablation; mammoplasty augmentation; irrigation & debridement ortho (Left, 6/3/2020); and mass excision ortho (Left, 6/3/2020).    SOCIAL HISTORY  Social History     Tobacco Use    Smoking " "status: Never Smoker    Smokeless tobacco: Never Used   Vaping Use    Vaping Use: Never used   Substance Use Topics    Alcohol use: Yes    Drug use: Yes     Types: Oral     Comment: Edibles      Social History     Substance and Sexual Activity   Drug Use Yes    Types: Oral    Comment: Edibles       FAMILY HISTORY  Family History   Problem Relation Age of Onset    Diabetes Mother     Heart Disease Father     Hypertension Father     Stroke Father         from an TBI    Hypertension Brother     Diabetes Paternal Uncle     Diabetes Maternal Grandfather     Hypertension Paternal Grandmother        CURRENT MEDICATIONS  Home Medications       Reviewed by Navi Christy R.N. (Registered Nurse) on 08/17/21 at 0506  Med List Status: Not Addressed     Medication Last Dose Status   Calcium-Vitamin D ([employer] CALCIUM-600/VIT D PO)  Active   hydroCHLOROthiazide (HYDRODIURIL) 25 MG Tab  Active   lisinopril (PRINIVIL) 20 MG Tab  Active   lisinopril-hydrochlorothiazide (PRINZIDE) 20-25 MG per tablet  Active   omeprazole (PRILOSEC) 20 MG delayed-release capsule  Active                    ALLERGIES  Allergies   Allergen Reactions    Sulfa Drugs Hives     Internal and external    Tape      Blisters, even with paper tape  tegaderm is OK       PHYSICAL EXAM  VITAL SIGNS: BP (!) 180/95   Pulse 86   Temp 36 °C (96.8 °F) (Temporal)   Resp 18   Ht 1.6 m (5' 3\")   Wt 99.8 kg (220 lb)   SpO2 95%   BMI 38.97 kg/m²   Nursing note and vitals reviewed.  Constitutional: Well-developed. Moderate distress secondary to pain. Obese.  HENT: Head is normocephalic and atraumatic. Oropharynx is clear and moist without exudate or erythema.   Eyes: Pupils are equal, round, and reactive to light. Conjunctiva are normal.   Cardiovascular: Normal rate and regular rhythm. No murmur heard. Symmetric radial pulses.   Pulmonary/Chest: Breath sounds normal. No wheezes or rales. No chest wall tenderness.   Abdominal: Mild Tenderness to palpation in the " epigastrium, negative Jackson's sign. Obese. Soft. No distention   Musculoskeletal: Extremities exhibit normal range of motion without edema or tenderness. No calf tenderness or palpable cords.   Neurological: Awake, alert and oriented to person, place, and time. No focal deficits noted.  Skin: Skin is warm and dry. No rash.   Psychiatric: Normal mood and affect. Appropriate for clinical situation     DIAGNOSTIC STUDIES / PROCEDURES    EKG Interpretation  Interpreted by me as below    LABS  Results for orders placed or performed during the hospital encounter of 08/17/21   CBC with Differential   Result Value Ref Range    WBC 7.9 4.8 - 10.8 K/uL    RBC 4.69 4.20 - 5.40 M/uL    Hemoglobin 14.5 12.0 - 16.0 g/dL    Hematocrit 42.8 37.0 - 47.0 %    MCV 91.3 81.4 - 97.8 fL    MCH 30.9 27.0 - 33.0 pg    MCHC 33.9 33.6 - 35.0 g/dL    RDW 41.1 35.9 - 50.0 fL    Platelet Count 192 164 - 446 K/uL    MPV 10.7 9.0 - 12.9 fL    Neutrophils-Polys 67.30 44.00 - 72.00 %    Lymphocytes 23.20 22.00 - 41.00 %    Monocytes 5.70 0.00 - 13.40 %    Eosinophils 2.90 0.00 - 6.90 %    Basophils 0.50 0.00 - 1.80 %    Immature Granulocytes 0.40 0.00 - 0.90 %    Nucleated RBC 0.00 /100 WBC    Neutrophils (Absolute) 5.31 2.00 - 7.15 K/uL    Lymphs (Absolute) 1.83 1.00 - 4.80 K/uL    Monos (Absolute) 0.45 0.00 - 0.85 K/uL    Eos (Absolute) 0.23 0.00 - 0.51 K/uL    Baso (Absolute) 0.04 0.00 - 0.12 K/uL    Immature Granulocytes (abs) 0.03 0.00 - 0.11 K/uL    NRBC (Absolute) 0.00 K/uL   Complete Metabolic Panel (CMP)   Result Value Ref Range    Sodium 138 135 - 145 mmol/L    Potassium 3.9 3.6 - 5.5 mmol/L    Chloride 100 96 - 112 mmol/L    Co2 24 20 - 33 mmol/L    Anion Gap 14.0 7.0 - 16.0    Glucose 110 (H) 65 - 99 mg/dL    Bun 10 8 - 22 mg/dL    Creatinine 0.68 0.50 - 1.40 mg/dL    Calcium 9.1 8.5 - 10.5 mg/dL    AST(SGOT) 19 12 - 45 U/L    ALT(SGPT) 23 2 - 50 U/L    Alkaline Phosphatase 80 30 - 99 U/L    Total Bilirubin 0.4 0.1 - 1.5 mg/dL     Albumin 4.2 3.2 - 4.9 g/dL    Total Protein 7.2 6.0 - 8.2 g/dL    Globulin 3.0 1.9 - 3.5 g/dL    A-G Ratio 1.4 g/dL   Troponin   Result Value Ref Range    Troponin T 10 6 - 19 ng/L   ESTIMATED GFR   Result Value Ref Range    GFR If African American >60 >60 mL/min/1.73 m 2    GFR If Non African American >60 >60 mL/min/1.73 m 2   EKG (NOW)   Result Value Ref Range    Report       [facility] Emergency Dept.    Test Date:  2021  Pt Name:    VELASQUEZ DUFF              Department: ER  MRN:        [id number]                      Room:  Gender:     Female                       Technician: [id number]  :        [id number]                   Requested By:ER TRIAGE PROTOCOL  Order #:    [id number]                    Reading MD: JAYLAN BENTLEY MD    Measurements  Intervals                                Axis  Rate:       83                           P:          16  MN:         180                          QRS:        26  QRSD:       76                           T:          0  QT:         412  QTc:        485    Interpretive Statements  SINUS RHYTHM  BORDERLINE T ABNORMALITIES, INFERIOR LEADS  Compared to ECG 2017 09:45:49  No significant changes  Electronically Signed On 2021 6:14:20 PDT by JAYLAN BENTLEY MD        All labs reviewed by me.    RADIOLOGY  US-RUQ   Final Result      1.  Heterogeneous and echogenic appearance of the liver can be seen in hepatic steatosis or hepatocellular disease.   2.  Cholelithiasis without evidence of cholecystitis. Gallbladder wall is at the upper limits of normal in thickness.   3.  No biliary ductal dilatation is seen.      CT-CTA COMPLETE THORACOABDOMINAL AORTA   Final Result      1.  No evidence of aortic aneurysm or dissection. No intramural hematoma.   2.  Nonobstructing left nephrolithiasis.            DX-CHEST-PORTABLE (1 VIEW)   Final Result      No acute cardiopulmonary abnormality.        The radiologist's interpretation of all radiological studies  have been reviewed by me.    COURSE & MEDICAL DECISION MAKING  Nursing notes, VS, PMSFHx reviewed in chart.     Review of past medical records shows the patient was last seen here in 2017 for chest pain, with a negative workup at that time.     6:05 AM - Patient seen and examined at bedside. I informed the patient of my plan to run diagnostic studies to evaluate their symptoms including imaging and labs. Patient verbalizes understanding and support with my plan of care. Ordered CT-CTA Complete Thoracoabdominal Aorta, DX-Chest, EKG, Lipase, HCG Qual Serum, CBC with diff, CMP, Troponin to evaluate her symptoms. The differential diagnoses include but are not limited to: ACS, Aortic Dissection, Pancreatitis, Cholelithiasis.      7:09 AM - The patient will be medicated with Dilaudid 1 mg injection and Zofran 4 mg injection.     8:03 AM - The patient's CT and labs are unrevealing. Ordered US-RUQ to further evaluate.     8:03 AM - Patient was reevaluated at bedside. She reports feeling improved following interventions. Discussed diagnostic study results with the patient as shown above. The patient will be medicated with GI Cocktail 30 mL oral suspension.     9:41 AM - Patient was reevaluated at bedside. She is now having a recurrence of pain. Reexamination reveals the patient is tender in the right upper quadrant. Discussed diagnostic study results with the patient which show Cholelithiasis without evidence of cholecystitis; Gallbladder wall is at the upper limits of normal in thickness. The patient will be medicated with Dilaudid 1 mg injection. Paged Surgery. Ordered SARS-CoV Antigen.     9:55 AM I discussed the patient's case and the above findings with Dr. Mcconnell (Surgery) who recommended admission to the Hospitalist and further evaluation with a HIDA scan.      9:55 AM - Paged Hospitalist.     9:59 AM I discussed the patient's case and the above findings with Dr. Vargas (Hospitalist) who will assess the patient for  hospitalization.      DISPOSITION:  Patient will be hospitalized by Dr. Vargas in guarded condition.     FINAL IMPRESSION  1. Epigastric pain    2. Acute chest pain    3. Calculus of gallbladder without cholecystitis without obstruction    4. Intractable pain        Wayne KAUR (Sil), am scribing for, and in the presence of, Bari Calloway M.D..    Electronically signed by: Wayne Connolly (Sil), 8/17/2021    IBari M.D. personally performed the services described in this documentation, as scribed by Wayne Connolly in my presence, and it is both accurate and complete.    The note accurately reflects work and decisions made by me.  Bari Calloway M.D.  8/17/2021  1:18 PM

## 2021-08-17 NOTE — PROGRESS NOTES
Patient assessment completed. Pt A&Ox 4. Respirations are even and unlabored on room air. Pt reports 4/10 abdominal pain at this time, but denies prescribed interventions at this. Patient vital signs are stable, and her call light and belongings within reach. POC updated (HIDA scan / pain managment). Pt educated on room and call light, pt verbalized understanding. Communication board updated. Needs met.

## 2021-08-17 NOTE — CARE PLAN
The patient is Stable - Low risk of patient condition declining or worsening    Shift Goals  Clinical Goals: HIDA / comfort  Patient Goals: Comfort / pain management  Family Goals: pain managament / discharge      Problem: Pain - Standard  Goal: Alleviation of pain or a reduction in pain to the patient’s comfort goal  Outcome: Progressing     Problem: Knowledge Deficit - Standard  Goal: Patient and family/care givers will demonstrate understanding of plan of care, disease process/condition, diagnostic tests and medications  Outcome: Progressing

## 2021-08-17 NOTE — ASSESSMENT & PLAN NOTE
-Recently diagnosed with hypertension  -We will continue home medication lisinopril and HCTZ  -Monitor blood pressure  -Hypertension during this admission might be pain induced

## 2021-08-17 NOTE — ASSESSMENT & PLAN NOTE
"-Reports, \" dull, intermittent'  -Radiates to epigastric region  -Rule out ACS  -Trend troponin  -Follow A1c, thyroid panel, lipid panel  -EKG  -Cardiac stress test  -Echocardiogram  "

## 2021-08-17 NOTE — ED NOTES
Report from Wallace MESSINA. Pt resting in bed at this time, family at bedside. Pt medicated per MAR. POC reviewed and questions answered. Continued monitoring in place.

## 2021-08-17 NOTE — CONSULTS
Surgery consult  8/17/2021      CC: chest pain    HPI:  Patito Briscoe is a very pleasant 46 y.o. female.  She is awake alert and appropriate  She reports awoke sleep severe chest pain radiating right neck. She states significant pressure not able take deep breathe  She received evaluation ED as above including ct scan and us significant gallstones  Surgery consulted gallstones  She reports no problems eating  She states no prior similar pain  She noted recent started treatment hypertension  She states now more comfortable some resifdula discomfort    Past Medical History:   Diagnosis Date   • Asthma, exercise induced 3/4/2013   • Heart burn        Past Surgical History:   Procedure Laterality Date   • IRRIGATION & DEBRIDEMENT ORTHO Left 6/3/2020    Procedure: IRRIGATION AND DEBRIDEMENT, WOUND- FOR RING FINGER NAIL REMOVAL;  Surgeon: Guero Blankenship M.D.;  Location: SURGERY [facility];  Service: Orthopedics   • MASS EXCISION ORTHO Left 6/3/2020    Procedure: EXCISION, MASS- SUBUNGUAL;  Surgeon: Guero Blankenship M.D.;  Location: SURGERY [facility];  Service: Orthopedics   • CLAVICLE ORIF     • ENDOMETRIAL ABLATION      2009   • MAMMOPLASTY AUGMENTATION      age 25   • TONSILLECTOMY         Current Facility-Administered Medications   Medication Dose Route Frequency Provider Last Rate Last Admin   • Respiratory Therapy Consult   Nebulization Continuous RT Geraldene R Ralleca-Llaguno, A.P.R.N.       • acetaminophen (Tylenol) tablet 650 mg  650 mg Oral Q6HRS PRN Geraldene R Ralleca-Llaguno, A.P.R.N.       • cloNIDine (CATAPRES) tablet 0.1 mg  0.1 mg Oral Q6HRS PRN Geraldene R Ralleca-Llaguno, A.P.R.N.       • enalaprilat (VASOTEC) injection 1.25 mg  1.25 mg Intravenous Q6HRS PRN Geraldene R Ralleca-Llaguno, A.P.R.N.       • labetalol (NORMODYNE/TRANDATE) injection 10 mg  10 mg Intravenous Q4HRS PRN Geraldene R Ralleca-Llaguno, A.P.R.N.       • ondansetron (ZOFRAN) syringe/vial injection 4 mg   4 mg Intravenous Q4HRS PRN Geraldene R Ralleca-Llaguno, A.P.R.N.       • ondansetron (ZOFRAN ODT) dispertab 4 mg  4 mg Oral Q4HRS PRN Geraldene R Ralleca-Llaguno, A.P.R.N.       • promethazine (PHENERGAN) tablet 12.5-25 mg  12.5-25 mg Oral Q4HRS PRN Geraldene R Ralleca-Llaguno, A.P.R.N.       • promethazine (PHENERGAN) suppository 12.5-25 mg  12.5-25 mg Rectal Q4HRS PRN Geraldene R Ralleca-Llaguno, A.P.R.N.       • prochlorperazine (COMPAZINE) injection 5-10 mg  5-10 mg Intravenous Q4HRS PRN Geraldene R Ralleca-Llaguno, A.P.R.N.       • senna-docusate (PERICOLACE or SENOKOT S) 8.6-50 MG per tablet 2 Tablet  2 Tablet Oral BID Geraldene R Ralleca-Llaguno, A.P.R.N.        And   • polyethylene glycol/lytes (MIRALAX) PACKET 1 Packet  1 Packet Oral QDAY PRN Geraldene R Ralleca-Llaguno, A.P.R.N.        And   • magnesium hydroxide (MILK OF MAGNESIA) suspension 30 mL  30 mL Oral QDAY PRN Geraldene R Ralleca-Llaguno, A.P.R.N.        And   • bisacodyl (DULCOLAX) suppository 10 mg  10 mg Rectal QDAY PRN Geraldene R Ralleca-Llaguno, A.P.R.N.       • regadenoson (LEXISCAN) injection SOLN 0.4 mg  0.4 mg Intravenous Once PRN Geraldene R Ralleca-Llaguno, A.P.R.N.       • aminophylline injection 100 mg  100 mg Intravenous Q5 MIN PRN Geraldene R Ralleca-Llaguno, A.P.R.N.       • aspirin (ASA) tablet 325 mg  325 mg Oral DAILY Geraldene R Ralleca-Llaguno, A.P.R.N.        Or   • aspirin (ASA) chewable tab 324 mg  324 mg Oral DAILY Ben Gonzales, A.P.R.N.   324 mg at 08/17/21 1105    Or   • aspirin (ASA) suppository 300 mg  300 mg Rectal DAILY Ben Gonzales, A.P.R.N.       • hyoscyamine-maalox plus-lidocaine viscous (GI COCKTAIL) oral susp 30 mL  30 mL Oral Q4HRS PRN Ben Gonzales, A.P.R.N.       • enoxaparin (LOVENOX) inj 40 mg  40 mg Subcutaneous DAILY Ben Gonzales, A.P.R.N.   40 mg at 08/17/21 1105   • ketorolac (TORADOL) injection 30 mg  30 mg Intravenous Q6HRS PRN eBn WILLIAM  ROCHELLE Gonzales         Current Outpatient Medications   Medication Sig Dispense Refill   • lisinopril-hydrochlorothiazide (PRINZIDE) 20-25 MG per tablet Take 1 tablet by mouth every day. 90 tablet 3   • Calcium-Vitamin D (CVS CALCIUM-600/VIT D PO) Take 1 Each by mouth every day.     • omeprazole (PRILOSEC) 20 MG delayed-release capsule TAKE ONE CAPSULE BY MOUTH ONCE DAILY 30 Cap 0       Social History     Socioeconomic History   • Marital status:      Spouse name: Not on file   • Number of children: Not on file   • Years of education: Not on file   • Highest education level: Not on file   Occupational History   • Not on file   Tobacco Use   • Smoking status: Never Smoker   • Smokeless tobacco: Never Used   Vaping Use   • Vaping Use: Never used   Substance and Sexual Activity   • Alcohol use: Yes   • Drug use: Yes     Types: Oral     Comment: Edibles   • Sexual activity: Yes     Partners: Male     Birth control/protection: Surgical     Comment: ; 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; self employeed    Other Topics Concern   • Not on file   Social History Narrative   • Not on file     Social Determinants of Health     Financial Resource Strain:    • Difficulty of Paying Living Expenses:    Food Insecurity:    • Worried About Running Out of Food in the Last Year:    • Ran Out of Food in the Last Year:    Transportation Needs:    • Lack of Transportation (Medical):    • Lack of Transportation (Non-Medical):    Physical Activity:    • Days of Exercise per Week:    • Minutes of Exercise per Session:    Stress:    • Feeling of Stress :    Social Connections:    • Frequency of Communication with Friends and Family:    • Frequency of Social Gatherings with Friends and Family:    • Attends [employer] Services:    • Active Member of Clubs or Organizations:    • Attends Club or Organization Meetings:    • Marital Status:    Intimate Partner Violence:    • Fear of Current or Ex-Partner:    • Emotionally Abused:    • Physically  "Abused:    • Sexually Abused:        Family History   Problem Relation Age of Onset   • Diabetes Mother    • Heart Disease Father    • Hypertension Father    • Stroke Father         from an TBI   • Hypertension Brother    • Diabetes Paternal Uncle    • Diabetes Maternal Grandfather    • Hypertension Paternal Grandmother        Allergies:  Sulfa drugs and Tape    Review of Systems:  Recent therapy hypertension  Sudden onset chest pain radiating neck as above    Physical Exam:  /75   Pulse 84   Temp 36 °C (96.8 °F) (Temporal)   Resp 15   Ht 1.6 m (5' 3\")   Wt 99.8 kg (220 lb)   SpO2 95%     Constitutional: Awake, alert,  Friendly cooperative.  Head: No cephalohematoma.no bleeding ears nose or mouth  Neck: No tracheal deviation. No stridor  Cardiovascular: Normal rate, skin warm brisk cap refill  Pulmonary/Chest:breahting with ease no cough no dstress  Abdominal: Soft, nondistended. Tender epigastrium no guarding no rebound  Neurological: awake alert appropriate  Friendly cooperative   Skin: Skin is warm and dry.     Psychiatric:  Normal mood and affect.  Behavior is appropriate.       Labs:  Recent Labs     08/17/21  0550   WBC 7.9   RBC 4.69   HEMOGLOBIN 14.5   HEMATOCRIT 42.8   MCV 91.3   MCH 30.9   MCHC 33.9   RDW 41.1   PLATELETCT 192   MPV 10.7     Recent Labs     08/17/21  0550   SODIUM 138   POTASSIUM 3.9   CHLORIDE 100   CO2 24   GLUCOSE 110*   BUN 10   CREATININE 0.68   CALCIUM 9.1         Recent Labs     08/17/21  0550   ASTSGOT 19   ALTSGPT 23   TBILIRUBIN 0.4   ALKPHOSPHAT 80   GLOBULIN 3.0       Radiology:  US-RUQ   Final Result      1.  Heterogeneous and echogenic appearance of the liver can be seen in hepatic steatosis or hepatocellular disease.   2.  Cholelithiasis without evidence of cholecystitis. Gallbladder wall is at the upper limits of normal in thickness.   3.  No biliary ductal dilatation is seen.      CT-CTA COMPLETE THORACOABDOMINAL AORTA   Final Result      1.  No evidence of " aortic aneurysm or dissection. No intramural hematoma.   2.  Nonobstructing left nephrolithiasis.            DX-CHEST-PORTABLE (1 VIEW)   Final Result      No acute cardiopulmonary abnormality.      NM-BILIARY (HIDA) SCAN WITH CCK    (Results Pending)   NM-CARDIAC STRESS TEST    (Results Pending)         Assessment: Patito Briscoe is a very pleasant 46 y.o. female presents sever chest pain radiation to neck   Labs as above  Imaging as above cholelithiasis without evidence cholecystitis  nephrolithiasis    Plan:   Ongoing evaluation medicine chest pain   Cardiac evaluation as directed primary  Close monitoring and support  Follow up consult cholelithiasis as other causes being evaluated  Unusual presentation for gallstones will evaluate further hida scan    discussed with patient and family  discussed ED provider        Jordy Mcconnell MD, FACS  [facility]  [phone]

## 2021-08-17 NOTE — ED NOTES
Pt states she has a Hx of new onset HTN that started about a month ago. Pt takes Lisinopril and Prilosec.

## 2021-08-17 NOTE — H&P
"Hospital Medicine History & Physical Note    Date of Service  8/17/2021    Primary Care Physician  GORDY Light.    Consultants  general surgery    Specialist Names: Enedina or ERNESTINE Guillen    Code Status  Full Code    Chief Complaint  Chief Complaint   Patient presents with   • Chest Pain     Woke up from sleep and it feels like my \"heart is ripping - pain everywhere\" - abdomen, back, center chest pain, right sided neck pain. feels like an elephant on them and unable to take deep breath.       History of Presenting Illness  Patito Briscoe is a 46 y.o. female with a PMHx of HTN who presented 8/17/2021 with chest pain and epigastric pain.  Patient reports that she was awoken last night with right upper quadrant pain that radiated to her mid abdomen and up to her chest.  No alleviating or exacerbating factor were noted.  Patient reports that it felt like it was \"ripping her apart \".  Patient reports that she has not had this type of pain in the past.  She denies any nausea, no vomiting.  She denies any history of GERD, no GI issues in the past.  Patient denies any fever, no chills, no sick contact.  Patient denies smoking, drinks alcohol occasionally, and denies any illicit drug use.    In ER, patient found to be hypertensive which is mostly induced with pain.  Blood work is within normal limits.  Initial troponin is at 10, we will trend troponin levels.  CTA of the thorax was obtained which noted no evidence of aortic aneurysm or dissection, but noted nonobstructing left nephrolithiasis.  US RUQ was also obtained which noted cholelithiasis without evidence of cholecystitis.  HIDA scan was also ordered.  I have personally reviewed initial EKG which noted a sinus rhythm with borderline T wave abnormalities in inferior leads.  CXR noted no cardiopulmonary process.  Patient will be admitted into the observation unit for ACS rule out and possible surgical intervention.  Dr. Mcconnell was also consulted for " possible cholecystectomy.    I discussed the plan of care with patient, family and bedside RN.    Review of Systems  Review of Systems   Constitutional: Negative for chills and fever.   HENT: Negative.    Eyes: Negative.    Respiratory: Negative.    Cardiovascular: Positive for chest pain.   Gastrointestinal: Positive for abdominal pain.   Genitourinary: Negative.    Musculoskeletal: Positive for myalgias.   Skin: Negative.    Neurological: Negative.    Endo/Heme/Allergies: Negative.    Psychiatric/Behavioral: Negative.        Past Medical History   has a past medical history of Asthma, exercise induced (3/4/2013) and Heart burn.    Surgical History   has a past surgical history that includes tonsillectomy; clavicle orif; endometrial ablation; mammoplasty augmentation; irrigation & debridement ortho (Left, 6/3/2020); and mass excision ortho (Left, 6/3/2020).     Family History  family history includes Diabetes in her maternal grandfather, mother, and paternal uncle; Heart Disease in her father; Hypertension in her brother, father, and paternal grandmother; Stroke in her father.   Family history reviewed with patient. There is no family history that is pertinent to the chief complaint.     Social History   reports that she has never smoked. She has never used smokeless tobacco. She reports current alcohol use. She reports current drug use. Drug: Oral.    Allergies  Allergies   Allergen Reactions   • Sulfa Drugs Hives     Internal and external   • Tape      Blisters, even with paper tape  tegaderm is OK       Medications  Prior to Admission Medications   Prescriptions Last Dose Informant Patient Reported? Taking?   Calcium-Vitamin D ([facility] CALCIUM-600/VIT D PO) 8/15/2021 at PM Patient Yes No   Sig: Take 1 Each by mouth every day.   lisinopril-hydrochlorothiazide (PRINZIDE) 20-25 MG per tablet 8/16/2021 at 1000 Patient No No   Sig: Take 1 tablet by mouth every day.   omeprazole (PRILOSEC) 20 MG delayed-release capsule  8/16/2021 at PM Patient No No   Sig: TAKE ONE CAPSULE BY MOUTH ONCE DAILY      Facility-Administered Medications: None       Physical Exam  Temp:  [36 °C (96.8 °F)] 36 °C (96.8 °F)  Pulse:  [81-90] 84  Resp:  [15-32] 15  BP: (132-180)/(66-97) 135/75  SpO2:  [95 %-100 %] 95 %    Physical Exam  Vitals and nursing note reviewed.   HENT:      Head: Atraumatic.      Nose: Nose normal.      Mouth/Throat:      Mouth: Mucous membranes are moist.      Pharynx: Oropharynx is clear.   Eyes:      Pupils: Pupils are equal, round, and reactive to light.   Cardiovascular:      Rate and Rhythm: Normal rate and regular rhythm.      Pulses: Normal pulses.   Pulmonary:      Breath sounds: Normal breath sounds.   Abdominal:      General: Bowel sounds are normal.      Palpations: Abdomen is soft.      Tenderness: There is abdominal tenderness. There is guarding.   Musculoskeletal:         General: Tenderness present.      Cervical back: Normal range of motion and neck supple.   Skin:     General: Skin is dry.      Capillary Refill: Capillary refill takes 2 to 3 seconds.   Neurological:      Mental Status: She is alert. Mental status is at baseline.         Laboratory:  Recent Labs     08/17/21  0550   WBC 7.9   RBC 4.69   HEMOGLOBIN 14.5   HEMATOCRIT 42.8   MCV 91.3   MCH 30.9   MCHC 33.9   RDW 41.1   PLATELETCT 192   MPV 10.7     Recent Labs     08/17/21  0550   SODIUM 138   POTASSIUM 3.9   CHLORIDE 100   CO2 24   GLUCOSE 110*   BUN 10   CREATININE 0.68   CALCIUM 9.1     Recent Labs     08/17/21  0550   ALTSGPT 23   ASTSGOT 19   ALKPHOSPHAT 80   TBILIRUBIN 0.4   GLUCOSE 110*         No results for input(s): NTPROBNP in the last 72 hours.      Recent Labs     08/17/21  0550   TROPONINT 10       Imaging:  US-RUQ   Final Result      1.  Heterogeneous and echogenic appearance of the liver can be seen in hepatic steatosis or hepatocellular disease.   2.  Cholelithiasis without evidence of cholecystitis. Gallbladder wall is at the upper  limits of normal in thickness.   3.  No biliary ductal dilatation is seen.      CT-CTA COMPLETE THORACOABDOMINAL AORTA   Final Result      1.  No evidence of aortic aneurysm or dissection. No intramural hematoma.   2.  Nonobstructing left nephrolithiasis.            DX-CHEST-PORTABLE (1 VIEW)   Final Result      No acute cardiopulmonary abnormality.      NM-BILIARY (HIDA) SCAN WITH CCK    (Results Pending)   NM-CARDIAC STRESS TEST    (Results Pending)       EKG:  I have personally reviewed the images and compared with prior images.    Assessment/Plan:  I anticipate this patient is appropriate for observation status at this time.    No new Assessment & Plan notes have been filed under this hospital service since the last note was generated.  Service: Hospital Medicine      VTE prophylaxis: SCDs/TEDs and enoxaparin ppx     ==========================================================================================================  Please note that this dictation was created using voice recognition software. I have made every reasonable attempt to correct obvious errors, but there may be errors of grammar and possibly content that I did not discover before finalizing the note.    Electronically signed by:  MEDINA Gonzales, MSN, APRN, FNP-C  Hospitalist Services  [facility]  [phone]  [email or web address]  08/17/21    3346

## 2021-08-17 NOTE — ED NOTES
Pt report called to CDU. POC reviewed with patient. All questions answered. Pt placed on transport.

## 2021-08-17 NOTE — ED NOTES
Pt medicated for pain per MAR.     Covid swab collected and sent to lab. POC reviewed. No other questions at this time.

## 2021-08-17 NOTE — ASSESSMENT & PLAN NOTE
-Denies any history of  -Possibly has had a history in the past and has not taken any medications for it  -We will start GI cocktail versus PPI

## 2021-08-17 NOTE — ED TRIAGE NOTES
"Patito Briscoe  46 y.o. female  Chief Complaint   Patient presents with   • Chest Pain     Woke up from sleep and it feels like my \"heart is ripping - pain everywhere\" - abdomen, back, center chest pain, right sided neck pain. feels like an elephant on them and unable to take deep breath.       Vitals:    08/17/21 0452   BP: 151/97   Pulse: 90   Resp: 18   Temp: 36 °C (96.8 °F)   SpO2: 100%        Patient ambulatory into triage for the complaint above, chest pain - pt indicating that the pain woke them up from sleep with sudden sharp severe pain in the chest and unable to catch breath. Pt indicating that the pain is all over abdomen, chest, back, right side neck pain - intermittent nausea and vomiting. Pt also is diaphoretic in triage room, winded, and unable to take deep breath due to hurting. Denies any numbness/tingling down arms, or up to jaw. EKG done and shown to ERP.     Patient is in stable condition at time of triage, has been educated on the triage process and placed back into the ED lobby at this time - pt has verbalized understanding of this process. Emesis bag given to pt and protocol to be ordered.    RN encouraged patient to alert staff at front for any changes that may occur while they are waiting to be evaluated by an ERP.     Of note, this RN and patient are in proper PPE and has a mask in place at all times during this encounter.     Past Medical History:   Diagnosis Date   • Asthma, exercise induced 3/4/2013   • Heart burn         "

## 2021-08-17 NOTE — ED NOTES
"Pt describing chest pain as \"it feels like im being torn in half\" and pointing to upper abdominal. Bi-lateral pressures checked. Right arm: 151/97 left arm: 147/86 - EKG performed.   "

## 2021-08-18 ENCOUNTER — ANESTHESIA EVENT (OUTPATIENT)
Dept: SURGERY | Facility: MEDICAL CENTER | Age: 46
End: 2021-08-18
Payer: COMMERCIAL

## 2021-08-18 ENCOUNTER — APPOINTMENT (OUTPATIENT)
Dept: RADIOLOGY | Facility: MEDICAL CENTER | Age: 46
End: 2021-08-18
Attending: INTERNAL MEDICINE
Payer: COMMERCIAL

## 2021-08-18 ENCOUNTER — ANESTHESIA (OUTPATIENT)
Dept: SURGERY | Facility: MEDICAL CENTER | Age: 46
End: 2021-08-18
Payer: COMMERCIAL

## 2021-08-18 PROBLEM — R10.11 RUQ PAIN: Status: ACTIVE | Noted: 2021-08-18

## 2021-08-18 PROBLEM — K81.9 CHOLECYSTITIS: Status: ACTIVE | Noted: 2021-08-18

## 2021-08-18 LAB
ANION GAP SERPL CALC-SCNC: 10 MMOL/L (ref 7–16)
BASOPHILS # BLD AUTO: 0.3 % (ref 0–1.8)
BASOPHILS # BLD: 0.02 K/UL (ref 0–0.12)
BUN SERPL-MCNC: 12 MG/DL (ref 8–22)
CALCIUM SERPL-MCNC: 8.4 MG/DL (ref 8.5–10.5)
CHLORIDE SERPL-SCNC: 101 MMOL/L (ref 96–112)
CO2 SERPL-SCNC: 25 MMOL/L (ref 20–33)
CREAT SERPL-MCNC: 0.68 MG/DL (ref 0.5–1.4)
EOSINOPHIL # BLD AUTO: 0.23 K/UL (ref 0–0.51)
EOSINOPHIL NFR BLD: 3.1 % (ref 0–6.9)
ERYTHROCYTE [DISTWIDTH] IN BLOOD BY AUTOMATED COUNT: 42 FL (ref 35.9–50)
GLUCOSE SERPL-MCNC: 102 MG/DL (ref 65–99)
HCT VFR BLD AUTO: 39.4 % (ref 37–47)
HGB BLD-MCNC: 13.2 G/DL (ref 12–16)
IMM GRANULOCYTES # BLD AUTO: 0.02 K/UL (ref 0–0.11)
IMM GRANULOCYTES NFR BLD AUTO: 0.3 % (ref 0–0.9)
LYMPHOCYTES # BLD AUTO: 1.69 K/UL (ref 1–4.8)
LYMPHOCYTES NFR BLD: 22.8 % (ref 22–41)
MCH RBC QN AUTO: 31 PG (ref 27–33)
MCHC RBC AUTO-ENTMCNC: 33.5 G/DL (ref 33.6–35)
MCV RBC AUTO: 92.5 FL (ref 81.4–97.8)
MONOCYTES # BLD AUTO: 0.56 K/UL (ref 0–0.85)
MONOCYTES NFR BLD AUTO: 7.5 % (ref 0–13.4)
NEUTROPHILS # BLD AUTO: 4.9 K/UL (ref 2–7.15)
NEUTROPHILS NFR BLD: 66 % (ref 44–72)
NRBC # BLD AUTO: 0 K/UL
NRBC BLD-RTO: 0 /100 WBC
PATHOLOGY CONSULT NOTE: NORMAL
PLATELET # BLD AUTO: 175 K/UL (ref 164–446)
PMV BLD AUTO: 11.3 FL (ref 9–12.9)
POTASSIUM SERPL-SCNC: 3.9 MMOL/L (ref 3.6–5.5)
RBC # BLD AUTO: 4.26 M/UL (ref 4.2–5.4)
SODIUM SERPL-SCNC: 136 MMOL/L (ref 135–145)
WBC # BLD AUTO: 7.4 K/UL (ref 4.8–10.8)

## 2021-08-18 PROCEDURE — 501399 HCHG SPECIMAN BAG, ENDO CATC: Performed by: SURGERY

## 2021-08-18 PROCEDURE — 88304 TISSUE EXAM BY PATHOLOGIST: CPT

## 2021-08-18 PROCEDURE — 78226 HEPATOBILIARY SYSTEM IMAGING: CPT

## 2021-08-18 PROCEDURE — 700105 HCHG RX REV CODE 258: Performed by: ANESTHESIOLOGY

## 2021-08-18 PROCEDURE — 96376 TX/PRO/DX INJ SAME DRUG ADON: CPT

## 2021-08-18 PROCEDURE — 85025 COMPLETE CBC W/AUTO DIFF WBC: CPT

## 2021-08-18 PROCEDURE — 501838 HCHG SUTURE GENERAL: Performed by: SURGERY

## 2021-08-18 PROCEDURE — 700102 HCHG RX REV CODE 250 W/ 637 OVERRIDE(OP): Performed by: NURSE PRACTITIONER

## 2021-08-18 PROCEDURE — 700101 HCHG RX REV CODE 250: Performed by: SURGERY

## 2021-08-18 PROCEDURE — A9270 NON-COVERED ITEM OR SERVICE: HCPCS | Performed by: NURSE PRACTITIONER

## 2021-08-18 PROCEDURE — 160029 HCHG SURGERY MINUTES - 1ST 30 MINS LEVEL 4: Performed by: SURGERY

## 2021-08-18 PROCEDURE — 160009 HCHG ANES TIME/MIN: Performed by: SURGERY

## 2021-08-18 PROCEDURE — 160041 HCHG SURGERY MINUTES - EA ADDL 1 MIN LEVEL 4: Performed by: SURGERY

## 2021-08-18 PROCEDURE — 500002 HCHG ADHESIVE, DERMABOND: Performed by: SURGERY

## 2021-08-18 PROCEDURE — 160036 HCHG PACU - EA ADDL 30 MINS PHASE I: Performed by: SURGERY

## 2021-08-18 PROCEDURE — 700111 HCHG RX REV CODE 636 W/ 250 OVERRIDE (IP): Performed by: ANESTHESIOLOGY

## 2021-08-18 PROCEDURE — 502571 HCHG PACK, LAP CHOLE: Performed by: SURGERY

## 2021-08-18 PROCEDURE — 501570 HCHG TROCAR, SEPARATOR: Performed by: SURGERY

## 2021-08-18 PROCEDURE — 700111 HCHG RX REV CODE 636 W/ 250 OVERRIDE (IP): Performed by: NURSE PRACTITIONER

## 2021-08-18 PROCEDURE — 700101 HCHG RX REV CODE 250: Performed by: ANESTHESIOLOGY

## 2021-08-18 PROCEDURE — 700111 HCHG RX REV CODE 636 W/ 250 OVERRIDE (IP)

## 2021-08-18 PROCEDURE — 99226 PR SUBSEQUENT OBSERVATION CARE,LEVEL III: CPT | Performed by: NURSE PRACTITIONER

## 2021-08-18 PROCEDURE — G0378 HOSPITAL OBSERVATION PER HR: HCPCS

## 2021-08-18 PROCEDURE — 160002 HCHG RECOVERY MINUTES (STAT): Performed by: SURGERY

## 2021-08-18 PROCEDURE — 80048 BASIC METABOLIC PNL TOTAL CA: CPT

## 2021-08-18 PROCEDURE — 501577 HCHG TROCAR, STEP 11MM: Performed by: SURGERY

## 2021-08-18 PROCEDURE — 160048 HCHG OR STATISTICAL LEVEL 1-5: Performed by: SURGERY

## 2021-08-18 PROCEDURE — 500514 HCHG ENDOCLIP: Performed by: SURGERY

## 2021-08-18 PROCEDURE — 501568 HCHG TROCAR, BLUNTPORT 12MM: Performed by: SURGERY

## 2021-08-18 PROCEDURE — 160035 HCHG PACU - 1ST 60 MINS PHASE I: Performed by: SURGERY

## 2021-08-18 PROCEDURE — 501583 HCHG TROCAR, THRD CAN&SEAL 5X100: Performed by: SURGERY

## 2021-08-18 RX ORDER — ROCURONIUM BROMIDE 10 MG/ML
INJECTION, SOLUTION INTRAVENOUS PRN
Status: DISCONTINUED | OUTPATIENT
Start: 2021-08-18 | End: 2021-08-18 | Stop reason: SURG

## 2021-08-18 RX ORDER — HYDROMORPHONE HYDROCHLORIDE 1 MG/ML
0.2 INJECTION, SOLUTION INTRAMUSCULAR; INTRAVENOUS; SUBCUTANEOUS
Status: DISCONTINUED | OUTPATIENT
Start: 2021-08-18 | End: 2021-08-18 | Stop reason: HOSPADM

## 2021-08-18 RX ORDER — SCOLOPAMINE TRANSDERMAL SYSTEM 1 MG/1
1 PATCH, EXTENDED RELEASE TRANSDERMAL
Status: DISCONTINUED | OUTPATIENT
Start: 2021-08-18 | End: 2021-08-18 | Stop reason: HOSPADM

## 2021-08-18 RX ORDER — CEFOTETAN DISODIUM 2 G/20ML
INJECTION, POWDER, FOR SOLUTION INTRAMUSCULAR; INTRAVENOUS PRN
Status: DISCONTINUED | OUTPATIENT
Start: 2021-08-18 | End: 2021-08-18 | Stop reason: SURG

## 2021-08-18 RX ORDER — OXYCODONE HYDROCHLORIDE AND ACETAMINOPHEN 5; 325 MG/1; MG/1
1 TABLET ORAL EVERY 4 HOURS PRN
Status: DISCONTINUED | OUTPATIENT
Start: 2021-08-18 | End: 2021-08-20 | Stop reason: HOSPADM

## 2021-08-18 RX ORDER — METOPROLOL TARTRATE 1 MG/ML
1 INJECTION, SOLUTION INTRAVENOUS
Status: DISCONTINUED | OUTPATIENT
Start: 2021-08-18 | End: 2021-08-18 | Stop reason: HOSPADM

## 2021-08-18 RX ORDER — MORPHINE SULFATE 4 MG/ML
3 INJECTION, SOLUTION INTRAMUSCULAR; INTRAVENOUS ONCE
Status: COMPLETED | OUTPATIENT
Start: 2021-08-18 | End: 2021-08-18

## 2021-08-18 RX ORDER — SODIUM CHLORIDE, SODIUM GLUCONATE, SODIUM ACETATE, POTASSIUM CHLORIDE AND MAGNESIUM CHLORIDE 526; 502; 368; 37; 30 MG/100ML; MG/100ML; MG/100ML; MG/100ML; MG/100ML
INJECTION, SOLUTION INTRAVENOUS
Status: DISCONTINUED | OUTPATIENT
Start: 2021-08-18 | End: 2021-08-18 | Stop reason: SURG

## 2021-08-18 RX ORDER — MEPERIDINE HYDROCHLORIDE 25 MG/ML
12.5 INJECTION INTRAMUSCULAR; INTRAVENOUS; SUBCUTANEOUS
Status: DISCONTINUED | OUTPATIENT
Start: 2021-08-18 | End: 2021-08-18 | Stop reason: HOSPADM

## 2021-08-18 RX ORDER — SODIUM CHLORIDE, SODIUM LACTATE, POTASSIUM CHLORIDE, CALCIUM CHLORIDE 600; 310; 30; 20 MG/100ML; MG/100ML; MG/100ML; MG/100ML
INJECTION, SOLUTION INTRAVENOUS CONTINUOUS
Status: DISCONTINUED | OUTPATIENT
Start: 2021-08-18 | End: 2021-08-18 | Stop reason: HOSPADM

## 2021-08-18 RX ORDER — DEXAMETHASONE SODIUM PHOSPHATE 4 MG/ML
INJECTION, SOLUTION INTRA-ARTICULAR; INTRALESIONAL; INTRAMUSCULAR; INTRAVENOUS; SOFT TISSUE PRN
Status: DISCONTINUED | OUTPATIENT
Start: 2021-08-18 | End: 2021-08-18 | Stop reason: SURG

## 2021-08-18 RX ORDER — BUPIVACAINE HYDROCHLORIDE AND EPINEPHRINE 5; 5 MG/ML; UG/ML
INJECTION, SOLUTION EPIDURAL; INTRACAUDAL; PERINEURAL
Status: DISCONTINUED | OUTPATIENT
Start: 2021-08-18 | End: 2021-08-18 | Stop reason: HOSPADM

## 2021-08-18 RX ORDER — ONDANSETRON 2 MG/ML
INJECTION INTRAMUSCULAR; INTRAVENOUS PRN
Status: DISCONTINUED | OUTPATIENT
Start: 2021-08-18 | End: 2021-08-18 | Stop reason: SURG

## 2021-08-18 RX ORDER — HYDROMORPHONE HYDROCHLORIDE 1 MG/ML
0.4 INJECTION, SOLUTION INTRAMUSCULAR; INTRAVENOUS; SUBCUTANEOUS
Status: DISCONTINUED | OUTPATIENT
Start: 2021-08-18 | End: 2021-08-18 | Stop reason: HOSPADM

## 2021-08-18 RX ORDER — DIPHENHYDRAMINE HYDROCHLORIDE 50 MG/ML
12.5 INJECTION INTRAMUSCULAR; INTRAVENOUS
Status: DISCONTINUED | OUTPATIENT
Start: 2021-08-18 | End: 2021-08-18 | Stop reason: HOSPADM

## 2021-08-18 RX ORDER — MORPHINE SULFATE 4 MG/ML
INJECTION, SOLUTION INTRAMUSCULAR; INTRAVENOUS
Status: DISCONTINUED
Start: 2021-08-18 | End: 2021-08-18

## 2021-08-18 RX ORDER — MIDAZOLAM HYDROCHLORIDE 1 MG/ML
INJECTION INTRAMUSCULAR; INTRAVENOUS PRN
Status: DISCONTINUED | OUTPATIENT
Start: 2021-08-18 | End: 2021-08-18 | Stop reason: SURG

## 2021-08-18 RX ORDER — MORPHINE SULFATE 4 MG/ML
2 INJECTION, SOLUTION INTRAMUSCULAR; INTRAVENOUS
Status: DISCONTINUED | OUTPATIENT
Start: 2021-08-18 | End: 2021-08-19

## 2021-08-18 RX ORDER — HALOPERIDOL 5 MG/ML
1 INJECTION INTRAMUSCULAR
Status: DISCONTINUED | OUTPATIENT
Start: 2021-08-18 | End: 2021-08-18 | Stop reason: HOSPADM

## 2021-08-18 RX ORDER — SODIUM CHLORIDE, SODIUM LACTATE, POTASSIUM CHLORIDE, CALCIUM CHLORIDE 600; 310; 30; 20 MG/100ML; MG/100ML; MG/100ML; MG/100ML
INJECTION, SOLUTION INTRAVENOUS
Status: DISCONTINUED | OUTPATIENT
Start: 2021-08-18 | End: 2021-08-18 | Stop reason: SURG

## 2021-08-18 RX ORDER — LABETALOL HYDROCHLORIDE 5 MG/ML
5 INJECTION, SOLUTION INTRAVENOUS
Status: DISCONTINUED | OUTPATIENT
Start: 2021-08-18 | End: 2021-08-18 | Stop reason: HOSPADM

## 2021-08-18 RX ORDER — HYDROMORPHONE HYDROCHLORIDE 1 MG/ML
0.1 INJECTION, SOLUTION INTRAMUSCULAR; INTRAVENOUS; SUBCUTANEOUS
Status: DISCONTINUED | OUTPATIENT
Start: 2021-08-18 | End: 2021-08-18 | Stop reason: HOSPADM

## 2021-08-18 RX ADMIN — DEXAMETHASONE SODIUM PHOSPHATE 8 MG: 4 INJECTION, SOLUTION INTRA-ARTICULAR; INTRALESIONAL; INTRAMUSCULAR; INTRAVENOUS; SOFT TISSUE at 12:03

## 2021-08-18 RX ADMIN — SODIUM CHLORIDE, POTASSIUM CHLORIDE, SODIUM LACTATE AND CALCIUM CHLORIDE: 600; 310; 30; 20 INJECTION, SOLUTION INTRAVENOUS at 12:12

## 2021-08-18 RX ADMIN — MORPHINE SULFATE 2 MG: 4 INJECTION INTRAVENOUS at 22:09

## 2021-08-18 RX ADMIN — HYDROMORPHONE HYDROCHLORIDE 0.4 MG: 1 INJECTION, SOLUTION INTRAMUSCULAR; INTRAVENOUS; SUBCUTANEOUS at 14:04

## 2021-08-18 RX ADMIN — FENTANYL CITRATE 50 MCG: 50 INJECTION, SOLUTION INTRAMUSCULAR; INTRAVENOUS at 13:22

## 2021-08-18 RX ADMIN — FENTANYL CITRATE 100 MCG: 50 INJECTION, SOLUTION INTRAMUSCULAR; INTRAVENOUS at 12:14

## 2021-08-18 RX ADMIN — MIDAZOLAM HYDROCHLORIDE 2 MG: 1 INJECTION, SOLUTION INTRAMUSCULAR; INTRAVENOUS at 12:07

## 2021-08-18 RX ADMIN — MORPHINE SULFATE 2 MG: 4 INJECTION INTRAVENOUS at 19:40

## 2021-08-18 RX ADMIN — KETOROLAC TROMETHAMINE 30 MG: 30 INJECTION, SOLUTION INTRAMUSCULAR at 00:33

## 2021-08-18 RX ADMIN — HYDROMORPHONE HYDROCHLORIDE 0.2 MG: 1 INJECTION, SOLUTION INTRAMUSCULAR; INTRAVENOUS; SUBCUTANEOUS at 15:00

## 2021-08-18 RX ADMIN — DOCUSATE SODIUM 50 MG AND SENNOSIDES 8.6 MG 2 TABLET: 8.6; 5 TABLET, FILM COATED ORAL at 19:55

## 2021-08-18 RX ADMIN — SUGAMMADEX 200 MG: 100 INJECTION, SOLUTION INTRAVENOUS at 13:00

## 2021-08-18 RX ADMIN — CEFOTETAN DISODIUM 2 G: 2 INJECTION, POWDER, FOR SOLUTION INTRAMUSCULAR; INTRAVENOUS at 12:07

## 2021-08-18 RX ADMIN — SODIUM CHLORIDE, SODIUM GLUCONATE, SODIUM ACETATE, POTASSIUM CHLORIDE AND MAGNESIUM CHLORIDE: 526; 502; 368; 37; 30 INJECTION, SOLUTION INTRAVENOUS at 12:37

## 2021-08-18 RX ADMIN — OXYCODONE HYDROCHLORIDE AND ACETAMINOPHEN 1 TABLET: 5; 325 TABLET ORAL at 21:07

## 2021-08-18 RX ADMIN — FENTANYL CITRATE 50 MCG: 50 INJECTION, SOLUTION INTRAMUSCULAR; INTRAVENOUS at 12:29

## 2021-08-18 RX ADMIN — ONDANSETRON 4 MG: 2 INJECTION INTRAMUSCULAR; INTRAVENOUS at 00:38

## 2021-08-18 RX ADMIN — ROCURONIUM BROMIDE 50 MG: 10 INJECTION, SOLUTION INTRAVENOUS at 12:14

## 2021-08-18 RX ADMIN — ONDANSETRON 6 MG: 2 INJECTION INTRAMUSCULAR; INTRAVENOUS at 12:47

## 2021-08-18 RX ADMIN — ONDANSETRON 4 MG: 2 INJECTION INTRAMUSCULAR; INTRAVENOUS at 17:24

## 2021-08-18 RX ADMIN — MORPHINE SULFATE 3 MG: 4 INJECTION INTRAVENOUS at 09:05

## 2021-08-18 RX ADMIN — PROPOFOL 150 MG: 10 INJECTION, EMULSION INTRAVENOUS at 12:14

## 2021-08-18 RX ADMIN — OXYCODONE HYDROCHLORIDE AND ACETAMINOPHEN 1 TABLET: 5; 325 TABLET ORAL at 17:24

## 2021-08-18 RX ADMIN — MORPHINE SULFATE 2 MG: 4 INJECTION INTRAVENOUS at 16:25

## 2021-08-18 RX ADMIN — HYDROMORPHONE HYDROCHLORIDE 0.4 MG: 1 INJECTION, SOLUTION INTRAMUSCULAR; INTRAVENOUS; SUBCUTANEOUS at 13:47

## 2021-08-18 RX ADMIN — FENTANYL CITRATE 50 MCG: 50 INJECTION, SOLUTION INTRAMUSCULAR; INTRAVENOUS at 12:56

## 2021-08-18 RX ADMIN — FENTANYL CITRATE 50 MCG: 50 INJECTION, SOLUTION INTRAMUSCULAR; INTRAVENOUS at 13:32

## 2021-08-18 RX ADMIN — ONDANSETRON 4 MG: 4 TABLET, ORALLY DISINTEGRATING ORAL at 22:05

## 2021-08-18 ASSESSMENT — ENCOUNTER SYMPTOMS
CONSTITUTIONAL NEGATIVE: 1
NEUROLOGICAL NEGATIVE: 1
ABDOMINAL PAIN: 1
HEARTBURN: 1
CHILLS: 0
MYALGIAS: 1
FEVER: 0
RESPIRATORY NEGATIVE: 1
PSYCHIATRIC NEGATIVE: 1
EYES NEGATIVE: 1

## 2021-08-18 ASSESSMENT — PAIN DESCRIPTION - PAIN TYPE
TYPE: SURGICAL PAIN
TYPE: ACUTE PAIN
TYPE: SURGICAL PAIN
TYPE: SURGICAL PAIN
TYPE: ACUTE PAIN
TYPE: ACUTE PAIN
TYPE: SURGICAL PAIN
TYPE: SURGICAL PAIN
TYPE: ACUTE PAIN
TYPE: ACUTE PAIN
TYPE: SURGICAL PAIN
TYPE: SURGICAL PAIN
TYPE: ACUTE PAIN

## 2021-08-18 ASSESSMENT — PATIENT HEALTH QUESTIONNAIRE - PHQ9
2. FEELING DOWN, DEPRESSED, IRRITABLE, OR HOPELESS: NOT AT ALL
SUM OF ALL RESPONSES TO PHQ9 QUESTIONS 1 AND 2: 0
1. LITTLE INTEREST OR PLEASURE IN DOING THINGS: NOT AT ALL

## 2021-08-18 ASSESSMENT — PAIN SCALES - GENERAL: PAIN_LEVEL: 4

## 2021-08-18 NOTE — ANESTHESIA TIME REPORT
Anesthesia Start and Stop Event Times     Date Time Event    8/18/2021 1141 Ready for Procedure     1203 Anesthesia Start     1317 Anesthesia Stop        Responsible Staff  08/18/21    Name Role Begin End    Irvin Galdamez D.O. Anesth 1203 1317        Preop Diagnosis (Free Text):  Pre-op Diagnosis     ACUTE CHOLECYSTITIS        Preop Diagnosis (Codes):    Post op Diagnosis  Acute cholecystitis      Premium Reason  Non-Premium    Comments:

## 2021-08-18 NOTE — PROGRESS NOTES
Report received from SIDDHARTH Armstrong. Pt arrive to unit. Abdominal surgical sites CDI. Abdomen soft and tender. Pt family at bedside. Call light and personal belongings within reach.

## 2021-08-18 NOTE — PROGRESS NOTES
4 Eyes Skin Assessment Completed by Brynn, RN and Angelo RN.    Head WDL  Ears WDL  Nose WDL  Mouth WDL  Neck WDL  Breast/Chest WDL  Shoulder Blades WDL  Spine WDL  (R) Arm/Elbow/Hand WDL  (L) Arm/Elbow/Hand WDL  Abdomen WDL  Groin WDL  Scrotum/Coccyx/Buttocks WDL  (R) Leg WDL  (L) Leg WDL  (R) Heel/Foot/Toe WDL  (L) Heel/Foot/Toe WDL          Devices In Places Tele Box and Pulse Ox      Interventions In Place Pillows    Possible Skin Injury No    Pictures Uploaded Into Epic N/A  Wound Consult Placed N/A  RN Wound Prevention Protocol Ordered No

## 2021-08-18 NOTE — OP REPORT
DATE OF OPERATION: 8/18/2021     PREOPERATIVE DIAGNOSIS: Acute cholecystitis    POSTOPERATIVE DIAGNOSIS: Acute cholecystitis    PROCEDURE PERFORMED: Laparoscopic cholecystectomy    SURGEON: Jordy Mcconnell M.D.    ANESTHESIOLOGIST:  Anesthesiologist: Irvin Galdamez D.O.    ANESTHESIA: General endotracheal anesthesia.      INDICATIONS: The patient is a 46 y.o.-year-old female with clinical and radiographic findings of acute cholecystitis. She  is taken to the operating room for laparoscopic cholecystectomy.     FINDINGS: Inflammatory changes of the gallbladder    WOUND CLASSIFICATION: Class II, Clean-Contaminated.    SPECIMEN: Gallbladder and contents    ESTIMATED BLOOD LOSS: 25 mL.    PROCEDURE: Following informed consent, the patient was properly identified, taken to the operating room and placed in supine position where general endotracheal anesthesia was administered. Intravenous antibiotics were administered by the anesthesiologist in correct time interval. Sequential compression devices were employed. The abdomen was prepped and draped into a sterile field.   Final timeout was completed.  Marcaine 0.5% with epinephrine was used to infiltrate the port sites. A 5 mm infraumbilical midline incision was made and subcutaneous tissue spread bluntly. The fascia was elevated and incised.  Howard port was placed.   The abdomen was insufflated to 15 mmHg of CO2.    A subxiphoid 12 mm trocar was applied.  2 right upper quadrant 5 mm trochars were applied under direct visualization.  The gallbladder was aspirated for safer handling  The gallbladder was located in its right upper quadrant position.  It was retracted superiorly and laterally.  The contents of Calot's triangle were dissected clearly free.  Once a critical view of safety was obtained, the cystic duct was triply clipped on the common duct side and singly clipped on the gallbladder side.  This was then transected.  The cystic artery was dealt with in a  similar fashion.  T careful retraction and electrocautery were used to transect the attachments of the gallbladder to the liver bed.  An Endo Catch was used to retrieve the gallbladder     The right upper quadrant was copiously irrigated till clear.  The clips were in place without biliary spillage.  Hemostasis was achieved.  An Endo Close was used to close the fascia of the Howard port and subxiphoid incision.  Monocryl was used for skin edges.  Dermabond was applied as a dressing, the patient was extubated to recovery in satisfactory condition.    The patient tolerated the procedure well and there were no apparent complication. All sponge, needle, and instrument counts were correct on 2 separate occasions. She  was awakened, extubated, and transferred to the recovery room in satisfactory condition.   ____________________________________   Jordy Mcconnell M.D.    DD: 8/18/2021  1:11 PM

## 2021-08-18 NOTE — CARE PLAN
The patient is Stable - Low risk of patient condition declining or worsening    Shift Goals  Clinical Goals: HIDA scan AM and pain control   Patient Goals: comfort  Family Goals: comfort    Progress made toward(s) clinical / shift goals:    Problem: Pain - Standard  Goal: Alleviation of pain or a reduction in pain to the patient’s comfort goal  Outcome: Progressing     Problem: Knowledge Deficit - Standard  Goal: Patient and family/care givers will demonstrate understanding of plan of care, disease process/condition, diagnostic tests and medications  Outcome: Progressing       Patient is not progressing towards the following goals:

## 2021-08-18 NOTE — OR SURGEON
Immediate Post OP Note    PreOp Diagnosis: Acute cholecystitis    PostOp Diagnosis: Acute cholecystitis      Procedure(s):  CHOLECYSTECTOMY, LAPAROSCOPIC - Wound Class: Clean Contaminated    Surgeon(s):  Jordy Mcconnell M.D.    Anesthesiologist/Type of Anesthesia:  Anesthesiologist: Irvin Galdamez D.O./General    Surgical Staff:  Circulator: Berny May R.N.  Relief Circulator: Meghna Parkinson R.N.  Scrub Person: Jennifer Simon    Specimens removed if any:  ID Type Source Tests Collected by Time Destination   A : Gallbladder Tissue Gallbladder PATHOLOGY SPECIMEN Jordy Mcconnell M.D. 8/18/2021 1226        Estimated Blood Loss: 25    Findings: Inflammatory changes of the gallbladder    Complications: None      8/18/2021 1:10 PM Jordy Mcconnell M.D.

## 2021-08-18 NOTE — ASSESSMENT & PLAN NOTE
-Positive Jackson sign  -RUQ diffused epigastric pain  -HIDA scan ordered: Noted cholecystitis  -GS, Dr. Mcconnell consulted  -Planned: Laparoscopic cholecystectomy

## 2021-08-18 NOTE — PROGRESS NOTES
Velasquez Briscoe seen  Discussed with her findings of her study    8/18/2021 7:49 AM     HISTORY/REASON FOR EXAM:  Cholelithiasis, abdominal pain     TECHNIQUE/EXAM DESCRIPTION AND NUMBER OF VIEWS:  Hepatobiliary scan.     COMPARISON: RIGHT upper quadrant ultrasound 8/17/2021     PROCEDURE:  5.5 mCi Tc 99m-Choletec was administered intravenously, followed by 70 minutes of anterior planar imaging.  Subsequently 3 mg morphine administered intravenously with 30 additional minutes imaging.     FINDINGS:  Prompt uptake and excretion of radiotracer by the liver.  Activity is seen within the common bile duct at 15 minutes.  Activity seen within the bowel at 15 minutes.  No gallbladder activity at 70 minutes.  After administration of morphine there is bile reflux to the stomach.  No definite gallbladder activity.     IMPRESSION:     1.  No gallbladder activity at 70 minutes or after morphine consistent with acute cholecystitis/cystic duct obstruction.  2.  Bile reflux is seen to the stomach.  3.  Common bile duct is patent.  Results for VELASQUEZ BRISCOE (MRN [id number]) as of 8/18/2021 11:26   Ref. Range 8/18/2021 05:05   WBC Latest Ref Range: 4.8 - 10.8 K/uL 7.4   RBC Latest Ref Range: 4.20 - 5.40 M/uL 4.26   Hemoglobin Latest Ref Range: 12.0 - 16.0 g/dL 13.2   Hematocrit Latest Ref Range: 37.0 - 47.0 % 39.4   MCV Latest Ref Range: 81.4 - 97.8 fL 92.5   MCH Latest Ref Range: 27.0 - 33.0 pg 31.0   MCHC Latest Ref Range: 33.6 - 35.0 g/dL 33.5 (L)   RDW Latest Ref Range: 35.9 - 50.0 fL 42.0   Platelet Count Latest Ref Range: 164 - 446 K/uL 175   MPV Latest Ref Range: 9.0 - 12.9 fL 11.3   Neutrophils-Polys Latest Ref Range: 44.00 - 72.00 % 66.00   Neutrophils (Absolute) Latest Ref Range: 2.00 - 7.15 K/uL 4.90   Lymphocytes Latest Ref Range: 22.00 - 41.00 % 22.80   Lymphs (Absolute) Latest Ref Range: 1.00 - 4.80 K/uL 1.69   Monocytes Latest Ref Range: 0.00 - 13.40 % 7.50   Monos (Absolute) Latest Ref Range: 0.00 - 0.85 K/uL  0.56   Eosinophils Latest Ref Range: 0.00 - 6.90 % 3.10   Eos (Absolute) Latest Ref Range: 0.00 - 0.51 K/uL 0.23   Basophils Latest Ref Range: 0.00 - 1.80 % 0.30   Baso (Absolute) Latest Ref Range: 0.00 - 0.12 K/uL 0.02   Immature Granulocytes Latest Ref Range: 0.00 - 0.90 % 0.30   Immature Granulocytes (abs) Latest Ref Range: 0.00 - 0.11 K/uL 0.02   Nucleated RBC Latest Units: /100 WBC 0.00   NRBC (Absolute) Latest Units: K/uL 0.00   Sodium Latest Ref Range: 135 - 145 mmol/L 136   Potassium Latest Ref Range: 3.6 - 5.5 mmol/L 3.9   Chloride Latest Ref Range: 96 - 112 mmol/L 101   Co2 Latest Ref Range: 20 - 33 mmol/L 25   Anion Gap Latest Ref Range: 7.0 - 16.0  10.0   Glucose Latest Ref Range: 65 - 99 mg/dL 102 (H)   Bun Latest Ref Range: 8 - 22 mg/dL 12   Creatinine Latest Ref Range: 0.50 - 1.40 mg/dL 0.68   GFR If  Latest Ref Range: >60 mL/min/1.73 m 2 >60   GFR If Non  Latest Ref Range: >60 mL/min/1.73 m 2 >60   Calcium Latest Ref Range: 8.5 - 10.5 mg/dL 8.4 (L)     Discussed findings plan with patient and family  Discussed options  Discussed laparoscopic possible open cholecystectomy  Discussed risk benefits and alternatives  Risk discussed included but not limited to devastating injury to  bile duct, liver requiring repair  Possible injury to bowel bladder blood vessels ureters or other intra-abdominal structures, retained stone, pancreatitis, chronic pain, chronic dyspepsia, hernia, pain bleeding infection, persistence of symptoms  All questions answered discussed  Informed consent obtained  Plan to proceed with laparoscopic possible open cholecystectomy

## 2021-08-18 NOTE — ANESTHESIA PROCEDURE NOTES
Airway    Date/Time: 8/18/2021 12:17 PM  Performed by: Irvin Galdamez D.O.  Authorized by: Irvin Galdamez D.O.     Location:  OR  Urgency:  Elective  Indications for Airway Management:  Anesthesia      Spontaneous Ventilation: absent    Sedation Level:  Deep  Preoxygenated: Yes    Patient Position:  Sniffing  Mask Difficulty Assessment:  1 - vent by mask  Final Airway Type:  Endotracheal airway  Final Endotracheal Airway:  ETT  Cuffed: Yes    Technique Used for Successful ETT Placement:  Direct laryngoscopy    Insertion Site:  Oral  Blade Type:  Estrada  Laryngoscope Blade/Videolaryngoscope Blade Size:  3  ETT Size (mm):  7.5  Measured from:  Lips  ETT to Lips (cm):  21  Placement Verified by: auscultation and capnometry    Cormack-Lehane Classification:  Grade IIa - partial view of glottis  Number of Attempts at Approach:  1

## 2021-08-18 NOTE — OR NURSING
Patient recovered well in post-op. AAOx4. VSS, on 2L via NC. Surgical sites CDI, pink x4 sites. Abdomen soft, tender. Surgical pain managed through IV medications. Per patient, pain is a 5/10 and she feels ready to return to her hospital room. Patient able to intake fluids without nausea. Spouse updated and discussed POC. No patient belongings in recovery. Report called to SIDDHARTH Holley. Awaiting transport. O2 tank 3/4 full for transport.

## 2021-08-18 NOTE — ANESTHESIA PREPROCEDURE EVALUATION
Relevant Problems   PULMONARY   (positive) Asthma, exercise induced      NEURO   (positive) Generalized headaches      CARDIAC   (positive) Hypertension      GI   (positive) Gastroesophageal reflux disease without esophagitis       Physical Exam    Airway   Mallampati: II  TM distance: >3 FB  Neck ROM: full       Cardiovascular - normal exam  Rhythm: regular  Rate: normal  (-) murmur     Dental - normal exam           Pulmonary - normal exam  Breath sounds clear to auscultation     Abdominal    Neurological - normal exam                 Anesthesia Plan    ASA 2       Plan - general       Airway plan will be ETT          Induction: intravenous    Postoperative Plan: Postoperative administration of opioids is intended.    Pertinent diagnostic labs and testing reviewed    Informed Consent:    Anesthetic plan and risks discussed with patient.    Use of blood products discussed with: patient whom consented to blood products.

## 2021-08-18 NOTE — PROGRESS NOTES
I have received report from day shift RN. I have assumed care of this patient. Patient has been assessed (see flow sheet) and plan of care has been discussed. Pain has been addressed per the MAR.  Patient verbalizes understanding of plan and denies any further needs at this time. Patient is A&Ox4 and VSS. Patient is now resting in bed, bed is in the lowest position and locked, and the call light is within reach.

## 2021-08-18 NOTE — PROGRESS NOTES
"Hospital Medicine Daily Progress Note    Date of Service  8/18/2021    Chief Complaint  Patito Briscoe is a 46 y.o. female admitted 8/17/2021 with RUQ pain    Hospital Course  Ms. Briscoe is a 46 y.o. female with a PMHx of HTN who presented 8/17/2021 with chest pain and epigastric pain.  Patient reports that she was awoken last night with right upper quadrant pain that radiated to her mid abdomen and up to her chest.  No alleviating or exacerbating factor were noted.  Patient reports that it felt like it was \"ripping her apart \".  Patient reports that she has not had this type of pain in the past.  She denies any nausea, no vomiting.  She denies any history of GERD, no GI issues in the past.  Patient denies any fever, no chills, no sick contact.  Patient denies smoking, drinks alcohol occasionally, and denies any illicit drug use.     In ER, patient found to be hypertensive which is mostly induced with pain.  Blood work is within normal limits.  Initial troponin is at 10, we will trend troponin levels.  CTA of the thorax was obtained which noted no evidence of aortic aneurysm or dissection, but noted nonobstructing left nephrolithiasis.  US RUQ was also obtained which noted cholelithiasis without evidence of cholecystitis.  HIDA scan was also ordered.  I have personally reviewed initial EKG which noted a sinus rhythm with borderline T wave abnormalities in inferior leads.  CXR noted no cardiopulmonary process.  Patient will be admitted into the observation unit for ACS rule out and possible surgical intervention.  Dr. Mcconnell was also consulted for possible cholecystectomy.      Interval Problem Update  -Patient seen and examined. Discussed with patient HIDA scan results which noted cholecystitis. General surgery, Dr. Mcconnell was made aware of the results of the HIDA scan. Patient will be going down for lap cholecystectomy today. Patient currently NPO. Discussed with patient process and educated on postprocedure " expectations.  -Plan of care: Anticipated lap cholecystectomy with Dr. Mcconnell today; can start on clear liquid diet and advance as tolerated post procedure; patient will need to pass flatus prior to being discharged.  -Disposition: Pending patient meeting criteria for discharge status post lap cholecystectomy  -Lab work: Reviewed; unremarkable  -VSS at this time  -There are no significant changes from previous R/PE, please see my previous note for details.    I have personally seen and examined the patient at bedside. I discussed the plan of care with patient, family and bedside RN.    Consultants/Specialty  general surgery    Code Status  Full Code    Disposition  Patient is medically cleared pending criteria to be d/c post procedure.   Anticipate discharge to to home with close outpatient follow-up.  I have placed the appropriate orders for post-discharge needs.    Review of Systems  Review of Systems   Constitutional: Negative.  Negative for chills and fever.   Eyes: Negative.    Respiratory: Negative.    Cardiovascular: Positive for chest pain.   Gastrointestinal: Positive for abdominal pain and heartburn.   Genitourinary: Negative.    Musculoskeletal: Positive for myalgias.   Skin: Negative.    Neurological: Negative.    Endo/Heme/Allergies: Negative.    Psychiatric/Behavioral: Negative.         Physical Exam  Temp:  [35.9 °C (96.7 °F)-36.6 °C (97.8 °F)] 36.4 °C (97.6 °F)  Pulse:  [67-84] 67  Resp:  [14-20] 17  BP: ()/(52-82) 96/62  SpO2:  [93 %-96 %] 93 %    Physical Exam  Vitals and nursing note reviewed.   HENT:      Head: Normocephalic.      Nose: Nose normal.      Mouth/Throat:      Pharynx: Oropharynx is clear.   Eyes:      Pupils: Pupils are equal, round, and reactive to light.   Cardiovascular:      Rate and Rhythm: Regular rhythm.      Pulses: Normal pulses.   Pulmonary:      Effort: Pulmonary effort is normal.      Breath sounds: Normal breath sounds.   Abdominal:      General: Bowel sounds are  normal.      Tenderness: There is abdominal tenderness. There is guarding.   Musculoskeletal:         General: Tenderness present.      Cervical back: Normal range of motion and neck supple.   Skin:     General: Skin is dry.      Capillary Refill: Capillary refill takes 2 to 3 seconds.   Neurological:      Mental Status: She is alert. Mental status is at baseline.         Fluids    Intake/Output Summary (Last 24 hours) at 8/18/2021 1054  Last data filed at 8/17/2021 1400  Gross per 24 hour   Intake 250 ml   Output --   Net 250 ml       Laboratory  Recent Labs     08/17/21  0550 08/18/21  0505   WBC 7.9 7.4   RBC 4.69 4.26   HEMOGLOBIN 14.5 13.2   HEMATOCRIT 42.8 39.4   MCV 91.3 92.5   MCH 30.9 31.0   MCHC 33.9 33.5*   RDW 41.1 42.0   PLATELETCT 192 175   MPV 10.7 11.3     Recent Labs     08/17/21  0550 08/18/21  0505   SODIUM 138 136   POTASSIUM 3.9 3.9   CHLORIDE 100 101   CO2 24 25   GLUCOSE 110* 102*   BUN 10 12   CREATININE 0.68 0.68   CALCIUM 9.1 8.4*                   Imaging  NM-HEPATOBILIARY SCAN   Final Result      1.  No gallbladder activity at 70 minutes or after morphine consistent with acute cholecystitis/cystic duct obstruction.   2.  Bile reflux is seen to the stomach.   3.  Common bile duct is patent.      US-RUQ   Final Result      1.  Heterogeneous and echogenic appearance of the liver can be seen in hepatic steatosis or hepatocellular disease.   2.  Cholelithiasis without evidence of cholecystitis. Gallbladder wall is at the upper limits of normal in thickness.   3.  No biliary ductal dilatation is seen.      CT-CTA COMPLETE THORACOABDOMINAL AORTA   Final Result      1.  No evidence of aortic aneurysm or dissection. No intramural hematoma.   2.  Nonobstructing left nephrolithiasis.            DX-CHEST-PORTABLE (1 VIEW)   Final Result      No acute cardiopulmonary abnormality.           Assessment/Plan  Cholecystitis  Assessment & Plan  -Verified by HIDA scan  -Correlates with RUQ pain  -Plan  "cholecystectomy with Dr. Mcconnell    RUQ pain  Assessment & Plan  -Positive Jackson sign  -RUQ diffused epigastric pain  -HIDA scan ordered: Noted cholecystitis  -GS, Dr. Mcconnell consulted  -Planned: Laparoscopic cholecystectomy    Hypertension  Assessment & Plan  -Recently diagnosed with hypertension  -We will continue home medication lisinopril and HCTZ  -Monitor blood pressure  -Hypertension during this admission might be pain induced    Other chest pain  Assessment & Plan  -Reports, \" dull, intermittent'  -Radiates to epigastric region  -Rule out ACS  -Trend troponin  -Follow A1c, thyroid panel, lipid panel  -EKG  -Cardiac stress test  -Echocardiogram    Class 2 obesity without serious comorbidity in adult- (present on admission)  Assessment & Plan  -Patient counseled  -Patient would benefit from an outpatient weight loss program    Gastroesophageal reflux disease without esophagitis- (present on admission)  Assessment & Plan  -Denies any history of  -Possibly has had a history in the past and has not taken any medications for it  -We will start GI cocktail versus PPI    Asthma, exercise induced- (present on admission)  Assessment & Plan  -not of exacerbation  -Does not normally take any medication  -Will monitor       VTE prophylaxis: SCDs/TEDs    I have performed a physical exam and reviewed and updated ROS and Plan today (8/18/2021). In review of yesterday's note (8/17/2021), there are no changes except as documented above.    ==========================================================================================================  Please note that this dictation was created using voice recognition software. I have made every reasonable attempt to correct obvious errors, but there may be errors of grammar and possibly content that I did not discover before finalizing the note.    Electronically signed by:  MEDINA Gonzales, MSN, APRN, FNP-C  Hospitalist Services  [facility]  [phone] " [phone]  [email or web address]  08/18/21     1125

## 2021-08-18 NOTE — ANESTHESIA POSTPROCEDURE EVALUATION
Patient: Patito Briscoe    Procedure Summary     Date: 08/18/21 Room / Location: [place] [address] / SURGERY [facility]    Anesthesia Start: 1203 Anesthesia Stop: 1317    Procedure: CHOLECYSTECTOMY, LAPAROSCOPIC (N/A Abdomen) Diagnosis: (ACUTE CHOLECYSTITIS)    Surgeons: Jordy Mcconnell M.D. Responsible Provider: Irvin Galdamez D.O.    Anesthesia Type: general ASA Status: 2          Final Anesthesia Type: general  Last vitals  BP   Blood Pressure: 111/61    Temp   36.8 °C (98.2 °F)    Pulse   71   Resp   17    SpO2   95 %      Anesthesia Post Evaluation    Patient location during evaluation: PACU  Patient participation: complete - patient participated  Level of consciousness: awake and alert  Pain score: 4    Airway patency: patent  Anesthetic complications: no  Cardiovascular status: hemodynamically stable  Respiratory status: acceptable  Hydration status: euvolemic    PONV: none          No complications documented.     Nurse Pain Score: 4 (NPRS)

## 2021-08-19 PROBLEM — G89.18 ACUTE POST-OPERATIVE PAIN: Status: ACTIVE | Noted: 2021-08-19

## 2021-08-19 LAB
ANION GAP SERPL CALC-SCNC: 10 MMOL/L (ref 7–16)
BUN SERPL-MCNC: 8 MG/DL (ref 8–22)
CALCIUM SERPL-MCNC: 8.5 MG/DL (ref 8.5–10.5)
CHLORIDE SERPL-SCNC: 98 MMOL/L (ref 96–112)
CO2 SERPL-SCNC: 26 MMOL/L (ref 20–33)
CREAT SERPL-MCNC: 0.79 MG/DL (ref 0.5–1.4)
ERYTHROCYTE [DISTWIDTH] IN BLOOD BY AUTOMATED COUNT: 42.2 FL (ref 35.9–50)
GLUCOSE SERPL-MCNC: 132 MG/DL (ref 65–99)
HCT VFR BLD AUTO: 38.8 % (ref 37–47)
HGB BLD-MCNC: 13 G/DL (ref 12–16)
MCH RBC QN AUTO: 31.2 PG (ref 27–33)
MCHC RBC AUTO-ENTMCNC: 33.5 G/DL (ref 33.6–35)
MCV RBC AUTO: 93 FL (ref 81.4–97.8)
PLATELET # BLD AUTO: 180 K/UL (ref 164–446)
PMV BLD AUTO: 11.2 FL (ref 9–12.9)
POTASSIUM SERPL-SCNC: 4 MMOL/L (ref 3.6–5.5)
RBC # BLD AUTO: 4.17 M/UL (ref 4.2–5.4)
SODIUM SERPL-SCNC: 134 MMOL/L (ref 135–145)
WBC # BLD AUTO: 12.9 K/UL (ref 4.8–10.8)

## 2021-08-19 PROCEDURE — G0378 HOSPITAL OBSERVATION PER HR: HCPCS

## 2021-08-19 PROCEDURE — 80048 BASIC METABOLIC PNL TOTAL CA: CPT

## 2021-08-19 PROCEDURE — 700111 HCHG RX REV CODE 636 W/ 250 OVERRIDE (IP): Performed by: NURSE PRACTITIONER

## 2021-08-19 PROCEDURE — 700111 HCHG RX REV CODE 636 W/ 250 OVERRIDE (IP): Performed by: STUDENT IN AN ORGANIZED HEALTH CARE EDUCATION/TRAINING PROGRAM

## 2021-08-19 PROCEDURE — A9270 NON-COVERED ITEM OR SERVICE: HCPCS | Performed by: NURSE PRACTITIONER

## 2021-08-19 PROCEDURE — 96372 THER/PROPH/DIAG INJ SC/IM: CPT

## 2021-08-19 PROCEDURE — 85027 COMPLETE CBC AUTOMATED: CPT

## 2021-08-19 PROCEDURE — 96376 TX/PRO/DX INJ SAME DRUG ADON: CPT

## 2021-08-19 PROCEDURE — 700102 HCHG RX REV CODE 250 W/ 637 OVERRIDE(OP): Performed by: NURSE PRACTITIONER

## 2021-08-19 RX ORDER — KETOROLAC TROMETHAMINE 30 MG/ML
30 INJECTION, SOLUTION INTRAMUSCULAR; INTRAVENOUS EVERY 6 HOURS PRN
Status: DISCONTINUED | OUTPATIENT
Start: 2021-08-19 | End: 2021-08-20 | Stop reason: HOSPADM

## 2021-08-19 RX ADMIN — ENOXAPARIN SODIUM 40 MG: 40 INJECTION SUBCUTANEOUS at 05:17

## 2021-08-19 RX ADMIN — KETOROLAC TROMETHAMINE 30 MG: 30 INJECTION, SOLUTION INTRAMUSCULAR; INTRAVENOUS at 06:11

## 2021-08-19 RX ADMIN — LISINOPRIL 20 MG: 20 TABLET ORAL at 05:14

## 2021-08-19 RX ADMIN — KETOROLAC TROMETHAMINE 30 MG: 30 INJECTION, SOLUTION INTRAMUSCULAR; INTRAVENOUS at 14:14

## 2021-08-19 RX ADMIN — ONDANSETRON 4 MG: 2 INJECTION INTRAMUSCULAR; INTRAVENOUS at 02:42

## 2021-08-19 RX ADMIN — ONDANSETRON 4 MG: 2 INJECTION INTRAMUSCULAR; INTRAVENOUS at 08:40

## 2021-08-19 RX ADMIN — KETOROLAC TROMETHAMINE 30 MG: 30 INJECTION, SOLUTION INTRAMUSCULAR; INTRAVENOUS at 00:38

## 2021-08-19 RX ADMIN — ONDANSETRON 4 MG: 4 TABLET, ORALLY DISINTEGRATING ORAL at 19:32

## 2021-08-19 RX ADMIN — DOCUSATE SODIUM 50 MG AND SENNOSIDES 8.6 MG 2 TABLET: 8.6; 5 TABLET, FILM COATED ORAL at 17:56

## 2021-08-19 RX ADMIN — OMEPRAZOLE 20 MG: 20 CAPSULE, DELAYED RELEASE ORAL at 05:17

## 2021-08-19 RX ADMIN — OXYCODONE HYDROCHLORIDE AND ACETAMINOPHEN 1 TABLET: 5; 325 TABLET ORAL at 10:14

## 2021-08-19 RX ADMIN — OXYCODONE HYDROCHLORIDE AND ACETAMINOPHEN 1 TABLET: 5; 325 TABLET ORAL at 02:46

## 2021-08-19 RX ADMIN — OXYCODONE HYDROCHLORIDE AND ACETAMINOPHEN 1 TABLET: 5; 325 TABLET ORAL at 18:00

## 2021-08-19 RX ADMIN — KETOROLAC TROMETHAMINE 30 MG: 30 INJECTION, SOLUTION INTRAMUSCULAR; INTRAVENOUS at 20:24

## 2021-08-19 RX ADMIN — DOCUSATE SODIUM 50 MG AND SENNOSIDES 8.6 MG 2 TABLET: 8.6; 5 TABLET, FILM COATED ORAL at 05:17

## 2021-08-19 ASSESSMENT — PAIN DESCRIPTION - PAIN TYPE
TYPE: ACUTE PAIN
TYPE: ACUTE PAIN
TYPE: ACUTE PAIN;SURGICAL PAIN
TYPE: ACUTE PAIN

## 2021-08-19 ASSESSMENT — ENCOUNTER SYMPTOMS
EYES NEGATIVE: 1
HEARTBURN: 1
CONSTITUTIONAL NEGATIVE: 1
MYALGIAS: 1
PSYCHIATRIC NEGATIVE: 1
NEUROLOGICAL NEGATIVE: 1
FEVER: 0
ABDOMINAL PAIN: 1
CHILLS: 0
RESPIRATORY NEGATIVE: 1

## 2021-08-19 NOTE — PROGRESS NOTES
Patient assessment completed. Pt A&Ox 4. Respirations are even and unlabored on room air. Pt reports 5/10 abdominal pain at this time and nausea. Patient medicated (see MAR) for nausea and decided to wait on pain medication for now until nausea subsides. Monitors applied, VS stable, call light and belongings within reach. POC updated (pain control / advance diet as tolerated). Pt educated on room and call light, pt verbalized understanding. Communication board updated. Needs met.

## 2021-08-19 NOTE — PROGRESS NOTES
"  Trauma/Surgical Progress Note    Author: Jordy Mcconnell M.D. Date & Time created: 8/19/2021   10:27 AM   8/18/2021     POSTOPERATIVE DIAGNOSIS: Acute cholecystitis     PROCEDURE PERFORMED: Laparoscopic cholecystectomy     SURGEON: Jordy Mcconnell M.D.  Interval Events:  Patito Briscoe awake alert  In good spirits  reports ambulating tolerating diet  Some breakthrough pain earlier now more comfortable      Hemodynamics:  /64   Pulse 88   Temp 36.2 °C (97.2 °F) (Temporal)   Resp 18   Ht 1.6 m (5' 3\")   Wt 99.8 kg (220 lb)   SpO2 95%      Respiratory:    Respiration: 18, Pulse Oximetry: 95 %           Fluids:    Intake/Output Summary (Last 24 hours) at 8/19/2021 1027  Last data filed at 8/18/2021 1317  Gross per 24 hour   Intake 1300 ml   Output 30 ml   Net 1270 ml     Admit Weight: 99.8 kg (220 lb)  Current      Physical Exam  Awake alert  Friendly cooperative  breathing with ease no cough no distress  Abdomen soft not distended  bruise umbilical incision glue in place  Appropriate incisional tenderness    Medical Decision Making/Problem List:    Active Hospital Problems    Diagnosis    • RUQ pain [R10.11]    • Cholecystitis [K81.9]    • Other chest pain [R07.89]    • Hypertension [I10]    • Class 2 obesity without serious comorbidity in adult [E66.9]    • Gastroesophageal reflux disease without esophagitis [K21.9]    • Asthma, exercise induced [J45.990]      Core Measures & Quality Metrics:  Core Measures & Quality Metrics  MATHEUS Score      Assessment/Plan  Recovering well surgery  reports continued chest/neck pain   Cardiac evaluation as directed medicine  No strenuous activity   No lifting greater than 20 pounds  No driving  [facility]    Discussed findings including operation  imaging and labs  Discussed wound care, no driving,  patient responsibilities in follow up plan, medications, limitations, and  importance of follow up  Contact information " provided  Discussed signs of complications and the importance of seeking immediate care 911 to nearest hospital if any occur  All questions answered/discussed

## 2021-08-19 NOTE — CARE PLAN
The patient is Stable - Low risk of patient condition declining or worsening    Shift Goals  Clinical Goals: pain control   Patient Goals: comfort  Family Goals: comfort    Progress made toward(s) clinical / shift goals:    Problem: Pain - Standard  Goal: Alleviation of pain or a reduction in pain to the patient’s comfort goal  Outcome: Progressing     Problem: Knowledge Deficit - Standard  Goal: Patient and family/care givers will demonstrate understanding of plan of care, disease process/condition, diagnostic tests and medications  Outcome: Progressing       Patient is not progressing towards the following goals:

## 2021-08-19 NOTE — PROGRESS NOTES
Pt ambulated from bed to restroom with standby assistance from Tech and spouse. Pt then ambulated around the unit 1x.

## 2021-08-19 NOTE — PROGRESS NOTES
"Hospital Medicine Daily Progress Note    Date of Service  8/19/2021    Chief Complaint  Patito Briscoe is a 46 y.o. female admitted 8/17/2021 with RUQ pain    Hospital Course  Ms. Briscoe is a 46 y.o. female with a PMHx of HTN who presented 8/17/2021 with chest pain and epigastric pain.  Patient reports that she was awoken last night with right upper quadrant pain that radiated to her mid abdomen and up to her chest.  No alleviating or exacerbating factor were noted.  Patient reports that it felt like it was \"ripping her apart \".  Patient reports that she has not had this type of pain in the past.  She denies any nausea, no vomiting.  She denies any history of GERD, no GI issues in the past.  Patient denies any fever, no chills, no sick contact.  Patient denies smoking, drinks alcohol occasionally, and denies any illicit drug use.     In ER, patient found to be hypertensive which is mostly induced with pain.  Blood work is within normal limits.  Initial troponin is at 10, we will trend troponin levels.  CTA of the thorax was obtained which noted no evidence of aortic aneurysm or dissection, but noted nonobstructing left nephrolithiasis.  US RUQ was also obtained which noted cholelithiasis without evidence of cholecystitis.  HIDA scan was also ordered.  I have personally reviewed initial EKG which noted a sinus rhythm with borderline T wave abnormalities in inferior leads.  CXR noted no cardiopulmonary process.  Patient will be admitted into the observation unit for ACS rule out and possible surgical intervention.  Dr. Mcconnell was also consulted for possible cholecystectomy.      Interval Problem Update  8/18/2021  -Patient seen and examined. Discussed with patient HIDA scan results which noted cholecystitis. General surgery, Dr. Mcconnell was made aware of the results of the HIDA scan. Patient will be going down for lap cholecystectomy today. Patient currently NPO. Discussed with patient process and educated on " postprocedure expectations.  -Plan of care: Anticipated lap cholecystectomy with Dr. Mcconnell today; can start on clear liquid diet and advance as tolerated post procedure; patient will need to pass flatus prior to being discharged.  -Disposition: Pending patient meeting criteria for discharge status post lap cholecystectomy  -Lab work: Reviewed; unremarkable  -VSS at this time  -There are no significant changes from previous R/PE, please see my previous note for details.    8/19/2021  -Patient seen and examined.  Patient reports lower abdominal pain/soreness status post procedure.  Patient also reports belching quite a bit.  Denies passing gas, no bowel movement yet.  Patient has been able to tolerate clear fluid intake.  Discussed with patient advancing diet tolerance, pain management, passing flatus or bowel movement prior to being discharged.  Discussed case with Dr. Mcconnell.  Patient and family aware and plan of care.  -Plan of care: Pain control; advance diet as tolerated; utilize antiemetics; encourage to pass flatus or have bowel movement.  -Disposition: Anticipated to stay overnight until patient able to tolerate advancement in diet; passing of flatus or have a bowel movement; clearance from general surgery, Dr. Mcconnell.  -Lab work: Reviewed; unremarkable  -VSS at this time  -There are no significant changes from previous ROS/PE, please see my previous notes for further details.    I have personally seen and examined the patient at bedside. I discussed the plan of care with patient, family and bedside RN.    Consultants/Specialty  general surgery    Code Status  Full Code    Disposition  Patient is medically cleared pending criteria to be d/c post procedure.   Anticipate discharge to to home with close outpatient follow-up.  I have placed the appropriate orders for post-discharge needs.    Review of Systems  Review of Systems   Constitutional: Negative.  Negative for chills and fever.   Eyes: Negative.     Respiratory: Negative.    Cardiovascular: Positive for chest pain.   Gastrointestinal: Positive for abdominal pain and heartburn.   Genitourinary: Negative.    Musculoskeletal: Positive for myalgias.   Skin: Negative.    Neurological: Negative.    Endo/Heme/Allergies: Negative.    Psychiatric/Behavioral: Negative.         Physical Exam  Temp:  [36.2 °C (97.2 °F)-37.4 °C (99.4 °F)] 36.2 °C (97.2 °F)  Pulse:  [] 88  Resp:  [14-20] 18  BP: ()/(55-87) 120/64  SpO2:  [91 %-98 %] 95 %    Physical Exam  Vitals and nursing note reviewed.   HENT:      Head: Normocephalic.      Nose: Nose normal.      Mouth/Throat:      Pharynx: Oropharynx is clear.   Eyes:      Pupils: Pupils are equal, round, and reactive to light.   Cardiovascular:      Rate and Rhythm: Regular rhythm.      Pulses: Normal pulses.   Pulmonary:      Effort: Pulmonary effort is normal.      Breath sounds: Normal breath sounds.   Abdominal:      General: Bowel sounds are normal.      Tenderness: There is abdominal tenderness. There is guarding.       Musculoskeletal:         General: Tenderness present.      Cervical back: Normal range of motion and neck supple.   Skin:     General: Skin is dry.      Capillary Refill: Capillary refill takes 2 to 3 seconds.   Neurological:      Mental Status: She is alert. Mental status is at baseline.         Fluids    Intake/Output Summary (Last 24 hours) at 8/19/2021 1031  Last data filed at 8/18/2021 1317  Gross per 24 hour   Intake 1300 ml   Output 30 ml   Net 1270 ml       Laboratory  Recent Labs     08/17/21  0550 08/18/21  0505 08/19/21  0330   WBC 7.9 7.4 12.9*   RBC 4.69 4.26 4.17*   HEMOGLOBIN 14.5 13.2 13.0   HEMATOCRIT 42.8 39.4 38.8   MCV 91.3 92.5 93.0   MCH 30.9 31.0 31.2   MCHC 33.9 33.5* 33.5*   RDW 41.1 42.0 42.2   PLATELETCT 192 175 180   MPV 10.7 11.3 11.2     Recent Labs     08/17/21  0550 08/18/21  0505 08/19/21  0330   SODIUM 138 136 134*   POTASSIUM 3.9 3.9 4.0   CHLORIDE 100 101 98   CO2  "24 25 26   GLUCOSE 110* 102* 132*   BUN 10 12 8   CREATININE 0.68 0.68 0.79   CALCIUM 9.1 8.4* 8.5                   Imaging  NM-HEPATOBILIARY SCAN   Final Result      1.  No gallbladder activity at 70 minutes or after morphine consistent with acute cholecystitis/cystic duct obstruction.   2.  Bile reflux is seen to the stomach.   3.  Common bile duct is patent.      US-RUQ   Final Result      1.  Heterogeneous and echogenic appearance of the liver can be seen in hepatic steatosis or hepatocellular disease.   2.  Cholelithiasis without evidence of cholecystitis. Gallbladder wall is at the upper limits of normal in thickness.   3.  No biliary ductal dilatation is seen.      CT-CTA COMPLETE THORACOABDOMINAL AORTA   Final Result      1.  No evidence of aortic aneurysm or dissection. No intramural hematoma.   2.  Nonobstructing left nephrolithiasis.            DX-CHEST-PORTABLE (1 VIEW)   Final Result      No acute cardiopulmonary abnormality.           Assessment/Plan  Acute post-operative pain  Assessment & Plan  -Status post laparoscopic cholecystectomy with general surgery  -Incision site CDI  -Bruising noted below umbilicus  -Utilize established pain regimen    Cholecystitis  Assessment & Plan  -Verified by HIDA scan  -Correlates with RUQ pain  -Plan cholecystectomy with Dr. Mcconnell    RUQ pain  Assessment & Plan  -Positive Jackson sign  -RUQ diffused epigastric pain  -HIDA scan ordered: Noted cholecystitis  -, Dr. Mcconnell consulted  -Planned: Laparoscopic cholecystectomy    Hypertension  Assessment & Plan  -Recently diagnosed with hypertension  -We will continue home medication lisinopril and HCTZ  -Monitor blood pressure  -Hypertension during this admission might be pain induced    Other chest pain  Assessment & Plan  -Reports, \" dull, intermittent'  -Radiates to epigastric region  -Rule out ACS  -Trend troponin  -Follow A1c, thyroid panel, lipid panel  -EKG  -Cardiac stress test  -Echocardiogram    Class 2 " obesity without serious comorbidity in adult- (present on admission)  Assessment & Plan  -Patient counseled  -Patient would benefit from an outpatient weight loss program    Gastroesophageal reflux disease without esophagitis- (present on admission)  Assessment & Plan  -Denies any history of  -Possibly has had a history in the past and has not taken any medications for it  -We will start GI cocktail versus PPI    Asthma, exercise induced- (present on admission)  Assessment & Plan  -not of exacerbation  -Does not normally take any medication  -Will monitor       VTE prophylaxis: SCDs/TEDs    I have performed a physical exam and reviewed and updated ROS and Plan today (8/19/2021). In review of yesterday's note (8/18/2021), there are no changes except as documented above.    ==========================================================================================================  Please note that this dictation was created using voice recognition software. I have made every reasonable attempt to correct obvious errors, but there may be errors of grammar and possibly content that I did not discover before finalizing the note.    Electronically signed by:  MEDINA Gonzales, MSN, APRN, FNP-C  Hospitalist Services  [facility]  [phone]  [email or web address]  08/19/21     1037

## 2021-08-19 NOTE — PROGRESS NOTES
Patient ambulated around the unit and tolerated activity without incident. Patient is now resting in bed, bed is in the lowest position and locked, and the call light is within reach.

## 2021-08-19 NOTE — CARE PLAN
The patient is Stable - Low risk of patient condition declining or worsening    Shift Goals  Clinical Goals: pain control / nausea control  Patient Goals: pain/ nausea control   Family Goals: comfort    Problem: Knowledge Deficit - Standard  Goal: Patient and family/care givers will demonstrate understanding of plan of care, disease process/condition, diagnostic tests and medications  Outcome: Progressing     Problem: Pain - Standard  Goal: Alleviation of pain or a reduction in pain to the patient’s comfort goal  Outcome: Progressing

## 2021-08-19 NOTE — ASSESSMENT & PLAN NOTE
-Status post laparoscopic cholecystectomy with general surgery  -Incision site CDI  -Bruising noted below umbilicus  -Utilize established pain regimen

## 2021-08-19 NOTE — PROGRESS NOTES
I have received report from day shift RN. I have assumed care of this patient. Patient has been assessed (see flow sheet) and plan of care has been discussed. Patient verbalizes understanding of plan. Patient's pain has been addressed per MAR. Patient tolerating jello and saltine crackers without incident. Patient denies any further needs at this time. Patient is now resting in bed, bed is in the lowest position and locked, and the call light is within reach.

## 2021-08-20 ENCOUNTER — PHARMACY VISIT (OUTPATIENT)
Dept: PHARMACY | Facility: MEDICAL CENTER | Age: 46
End: 2021-08-20
Payer: COMMERCIAL

## 2021-08-20 VITALS
TEMPERATURE: 97.1 F | BODY MASS INDEX: 38.98 KG/M2 | RESPIRATION RATE: 18 BRPM | WEIGHT: 220 LBS | DIASTOLIC BLOOD PRESSURE: 60 MMHG | SYSTOLIC BLOOD PRESSURE: 101 MMHG | OXYGEN SATURATION: 94 % | HEIGHT: 63 IN | HEART RATE: 73 BPM

## 2021-08-20 PROBLEM — G89.18 ACUTE POST-OPERATIVE PAIN: Status: RESOLVED | Noted: 2021-08-19 | Resolved: 2021-08-20

## 2021-08-20 PROBLEM — R07.89 OTHER CHEST PAIN: Status: RESOLVED | Noted: 2021-08-17 | Resolved: 2021-08-20

## 2021-08-20 PROBLEM — R10.11 RUQ PAIN: Status: RESOLVED | Noted: 2021-08-18 | Resolved: 2021-08-20

## 2021-08-20 PROBLEM — K81.9 CHOLECYSTITIS: Status: RESOLVED | Noted: 2021-08-18 | Resolved: 2021-08-20

## 2021-08-20 LAB
ANION GAP SERPL CALC-SCNC: 12 MMOL/L (ref 7–16)
BUN SERPL-MCNC: 15 MG/DL (ref 8–22)
CALCIUM SERPL-MCNC: 9 MG/DL (ref 8.5–10.5)
CHLORIDE SERPL-SCNC: 101 MMOL/L (ref 96–112)
CO2 SERPL-SCNC: 25 MMOL/L (ref 20–33)
CREAT SERPL-MCNC: 0.72 MG/DL (ref 0.5–1.4)
ERYTHROCYTE [DISTWIDTH] IN BLOOD BY AUTOMATED COUNT: 43.8 FL (ref 35.9–50)
GLUCOSE SERPL-MCNC: 109 MG/DL (ref 65–99)
HCT VFR BLD AUTO: 39.5 % (ref 37–47)
HGB BLD-MCNC: 12.9 G/DL (ref 12–16)
MCH RBC QN AUTO: 30.9 PG (ref 27–33)
MCHC RBC AUTO-ENTMCNC: 32.7 G/DL (ref 33.6–35)
MCV RBC AUTO: 94.7 FL (ref 81.4–97.8)
PLATELET # BLD AUTO: 167 K/UL (ref 164–446)
PMV BLD AUTO: 11.1 FL (ref 9–12.9)
POTASSIUM SERPL-SCNC: 4.3 MMOL/L (ref 3.6–5.5)
RBC # BLD AUTO: 4.17 M/UL (ref 4.2–5.4)
SODIUM SERPL-SCNC: 138 MMOL/L (ref 135–145)
WBC # BLD AUTO: 8.9 K/UL (ref 4.8–10.8)

## 2021-08-20 PROCEDURE — 700111 HCHG RX REV CODE 636 W/ 250 OVERRIDE (IP): Performed by: STUDENT IN AN ORGANIZED HEALTH CARE EDUCATION/TRAINING PROGRAM

## 2021-08-20 PROCEDURE — A9270 NON-COVERED ITEM OR SERVICE: HCPCS | Performed by: NURSE PRACTITIONER

## 2021-08-20 PROCEDURE — 99217 PR OBSERVATION CARE DISCHARGE: CPT | Performed by: NURSE PRACTITIONER

## 2021-08-20 PROCEDURE — G0378 HOSPITAL OBSERVATION PER HR: HCPCS

## 2021-08-20 PROCEDURE — 85027 COMPLETE CBC AUTOMATED: CPT

## 2021-08-20 PROCEDURE — RXMED WILLOW AMBULATORY MEDICATION CHARGE: Performed by: NURSE PRACTITIONER

## 2021-08-20 PROCEDURE — 80048 BASIC METABOLIC PNL TOTAL CA: CPT

## 2021-08-20 PROCEDURE — 700102 HCHG RX REV CODE 250 W/ 637 OVERRIDE(OP): Performed by: NURSE PRACTITIONER

## 2021-08-20 PROCEDURE — 96376 TX/PRO/DX INJ SAME DRUG ADON: CPT

## 2021-08-20 RX ORDER — AMOXICILLIN 250 MG
1 CAPSULE ORAL DAILY
Qty: 3 TABLET | Refills: 0 | Status: SHIPPED | OUTPATIENT
Start: 2021-08-20 | End: 2021-08-23

## 2021-08-20 RX ORDER — OXYCODONE HYDROCHLORIDE 5 MG/1
TABLET ORAL
Qty: 3 TABLET | Refills: 0 | Status: SHIPPED | OUTPATIENT
Start: 2021-08-20 | End: 2021-08-20

## 2021-08-20 RX ORDER — OXYCODONE HYDROCHLORIDE 5 MG/1
5 TABLET ORAL NIGHTLY PRN
Qty: 3 TABLET | Refills: 0 | Status: SHIPPED | OUTPATIENT
Start: 2021-08-20 | End: 2021-08-23

## 2021-08-20 RX ORDER — TRAMADOL HYDROCHLORIDE 50 MG/1
50 TABLET ORAL EVERY 6 HOURS PRN
Qty: 12 TABLET | Refills: 0 | Status: SHIPPED | OUTPATIENT
Start: 2021-08-20 | End: 2021-08-23

## 2021-08-20 RX ORDER — ONDANSETRON 4 MG/1
4 TABLET, ORALLY DISINTEGRATING ORAL EVERY 6 HOURS PRN
Qty: 12 TABLET | Refills: 0 | Status: SHIPPED | OUTPATIENT
Start: 2021-08-20 | End: 2021-08-23

## 2021-08-20 RX ORDER — TRAMADOL HYDROCHLORIDE 50 MG/1
TABLET ORAL
Qty: 12 TABLET | Refills: 0 | Status: SHIPPED | OUTPATIENT
Start: 2021-08-20 | End: 2021-08-20

## 2021-08-20 RX ADMIN — OMEPRAZOLE 20 MG: 20 CAPSULE, DELAYED RELEASE ORAL at 03:23

## 2021-08-20 RX ADMIN — KETOROLAC TROMETHAMINE 30 MG: 30 INJECTION, SOLUTION INTRAMUSCULAR; INTRAVENOUS at 08:04

## 2021-08-20 RX ADMIN — OXYCODONE HYDROCHLORIDE AND ACETAMINOPHEN 1 TABLET: 5; 325 TABLET ORAL at 03:23

## 2021-08-20 ASSESSMENT — PAIN DESCRIPTION - PAIN TYPE: TYPE: ACUTE PAIN

## 2021-08-20 NOTE — DISCHARGE INSTRUCTIONS
Discharge Instructions    Discharged to home by car with relative. Discharged via wheelchair, hospital escort: Yes.  Special equipment needed: Not Applicable    Be sure to schedule a follow-up appointment with your primary care doctor or any specialists as instructed.     Discharge Plan:   Diet Plan: Discussed  Activity Level: Discussed  Confirmed Follow up Appointment: Patient to Call and Schedule Appointment  Confirmed Symptoms Management: Discussed  Medication Reconciliation Updated: Yes    I understand that a diet low in cholesterol, fat, and sodium is recommended for good health. Unless I have been given specific instructions below for another diet, I accept this instruction as my diet prescription.   Other diet:     Special Instructions: None    · Is patient discharged on Warfarin / Coumadin?   No     Depression / Suicide Risk    As you are discharged from this [facility], it is important to learn how to keep safe from harming yourself.    Recognize the warning signs:  · Abrupt changes in personality, positive or negative- including increase in energy   · Giving away possessions  · Change in eating patterns- significant weight changes-  positive or negative  · Change in sleeping patterns- unable to sleep or sleeping all the time   · Unwillingness or inability to communicate  · Depression  · Unusual sadness, discouragement and loneliness  · Talk of wanting to die  · Neglect of personal appearance   · Rebelliousness- reckless behavior  · Withdrawal from people/activities they love  · Confusion- inability to concentrate     If you or a loved one observes any of these behaviors or has concerns about self-harm, here's what you can do:  · Talk about it- your feelings and reasons for harming yourself  · Remove any means that you might use to hurt yourself (examples: pills, rope, extension cords, firearm)  · Get professional help from the community (Mental Health, Substance Abuse, psychological  counseling)  · Do not be alone:Call your Safe Contact- someone whom you trust who will be there for you.  · Call your local CRISIS HOTLINE 503-8380 or 428-750-5949  · Call your local Children's Mobile Crisis Response Team [place] (725) 552-4756 or www.[employer]  · Call the toll free National Suicide Prevention Hotlines   · National Suicide Prevention Lifeline 055-168-BFWH (4915)  · Me-Mover Line Network 800-SUICIDE (875-3508)    FOLLOW UP ITEMS POST DISCHARGE  Please call [phone] to schedule PCP appointment for patient.    Required specialty appointments include:       Discharge Instructions per KATLIN BurrellRKierraN.  => Follow-up with PCP s/p hospitalization  => Follow-up with general surgery, Dr. Mcconnell status post laparoscopic cholecystectomy  => Utilize discussed pain regimen for pain management  => Utilize stool softeners while on pain medication    DIET: Low fat containing diet for at least 4-6 weeks    ACTIVITY: As tolerated    DIAGNOSIS: Cholecystitis    Return to ER if symptoms persist, chest pain, palpitations, shortness of breath, numbness, tingling, weakness, and high fevers.

## 2021-08-20 NOTE — DISCHARGE SUMMARY
"Discharge Summary    CHIEF COMPLAINT ON ADMISSION  Chief Complaint   Patient presents with   • Chest Pain     Woke up from sleep and it feels like my \"heart is ripping - pain everywhere\" - abdomen, back, center chest pain, right sided neck pain. feels like an elephant on them and unable to take deep breath.       Reason for Admission  Chest Pain     Admission Date  8/17/2021    CODE STATUS  Full Code    HPI & HOSPITAL COURSE    Ms. Briscoe is a 46 y.o. female with a PMHx of HTN who presented 8/17/2021 with chest pain and epigastric pain.  Patient reports that she was awoken last night with right upper quadrant pain that radiated to her mid abdomen and up to her chest.  No alleviating or exacerbating factor were noted.  Patient reports that it felt like it was \"ripping her apart \".  Patient reports that she has not had this type of pain in the past.  She denies any nausea, no vomiting.  She denies any history of GERD, no GI issues in the past.  Patient denies any fever, no chills, no sick contact.  Patient denies smoking, drinks alcohol occasionally, and denies any illicit drug use.     In ER, patient found to be hypertensive which is mostly induced with pain.  Blood work is within normal limits.  Initial troponin is at 10, we will trend troponin levels.  CTA of the thorax was obtained which noted no evidence of aortic aneurysm or dissection, but noted nonobstructing left nephrolithiasis.  US RUQ was also obtained which noted cholelithiasis without evidence of cholecystitis.  HIDA scan was also ordered.  I have personally reviewed initial EKG which noted a sinus rhythm with borderline T wave abnormalities in inferior leads.  CXR noted no cardiopulmonary process.  Patient will be admitted into the observation unit for ACS rule out and possible surgical intervention.  Dr. Mcconnell was also consulted for possible cholecystectomy.    During this course of stay, patient underwent a laparoscopic cholecystectomy with general " surgery, Dr. Mcconnell on 8/18/2021.  Patient unfortunately had a lot of postoperative pain.  Patient was able to tolerate advancement in diet after pain was controlled.  Patient also was able to have a bowel movement prior to discharged.  Patient will be prescribed tramadol to be utilized for mild to moderate pain and oxycodone for breakthrough pain management.  Patient encouraged to utilize stool softeners while on pain medication.  Patient also prescribed antiemetics.  Patient highly recommended to follow-up with PCP and follow-up with general surgery, Dr. Mcconnell.  All questions and concerns answered prior to being discharged.  Patient discharged home.    Therefore, she is discharged in good and stable condition to home with close outpatient follow-up.    The patient met 2-midnight criteria for an inpatient stay at the time of discharge.    Discharge Date  08/20/21      FOLLOW UP ITEMS POST DISCHARGE  Please call [phone] to schedule PCP appointment for patient.    Required specialty appointments include:       Discharge Instructions per Ben Gonzales A.P.R.NKierra  => Follow-up with PCP s/p hospitalization  => Follow-up with general Dr. Enedina freedman status post laparoscopic cholecystectomy  => Utilize discussed pain regimen for pain management  => Utilize stool softeners while on pain medication      DIET: Low fat containing diet for at least 4-6 weeks    ACTIVITY: As tolerated    DIAGNOSIS: Cholecystitis    Return to ER if symptoms persist, chest pain, palpitations, shortness of breath, numbness, tingling, weakness, and high fevers.      DISCHARGE DIAGNOSES  Active Problems:    Asthma, exercise induced POA: Yes    Gastroesophageal reflux disease without esophagitis POA: Yes    Class 2 obesity without serious comorbidity in adult POA: Yes    Hypertension POA: Unknown  Resolved Problems:    Other chest pain POA: Unknown    RUQ pain POA: Unknown    Cholecystitis POA: Unknown    Acute post-operative  pain POA: Unknown      FOLLOW UP  Future Appointments   Date Time Provider Department Center   9/13/2021  9:00 AM [facility] None   9/21/2021  3:30 PM [facility] None   10/18/2021  8:15 AM [facility] EXAM 9 [facility]   10/25/2021  8:45 AM [facility] None     KATHE LightP.NKierra  [address]  [place] [address]  [phone]    Schedule an appointment as soon as possible for a visit       [facility], Emergency Dept  [address] [address] [phone]    If symptoms worsen    Jordy Mcconnell M.D.  [address]  [place] [id number] [phone]    Schedule an appointment as soon as possible for a visit   general surgeon, ask about gallbladder removal      MEDICATIONS ON DISCHARGE     Medication List      START taking these medications      Instructions   ondansetron 4 MG Tbdp  Commonly known as: ZOFRAN ODT   Take 1 Tablet by mouth every 6 hours as needed for Nausea (give PO if no IV route available) for up to 3 days.  Dose: 4 mg     oxyCODONE immediate-release 5 MG Tabs  Commonly known as: ROXICODONE   Take 1 Tablet by mouth at bedtime as needed for Severe Pain for up to 3 days.  Dose: 5 mg     senna-docusate 8.6-50 MG Tabs  Commonly known as: PERICOLACE or SENOKOT S   Take 1 Tablet by mouth every day for 3 days.  Dose: 1 Tablet     traMADol 50 MG Tabs  Commonly known as: ULTRAM   Take 1 Tablet by mouth every 6 hours as needed for Mild Pain or Moderate Pain for up to 3 days.  Dose: 50 mg        CONTINUE taking these medications      Instructions   [employer] CALCIUM-600/VIT D PO   Take 1 Each by mouth every day.  Dose: 1 Each     lisinopril-hydrochlorothiazide 20-25 MG per tablet  Commonly known as: PRINZIDE   Take 1 tablet by mouth every day.  Dose: 1 Tablet     omeprazole 20 MG delayed-release capsule  Commonly known as: PRILOSEC   TAKE ONE CAPSULE BY MOUTH ONCE DAILY             Allergies  Allergies   Allergen Reactions   • Sulfa Drugs Hives     Internal and external   • Tape      Blisters, even with paper tape  tegaderm is OK       DIET  Orders Placed This Encounter   Procedures   • Diet Order Diet: Regular (Advance as tolerated ); Miscellaneous modifications: (optional): Vegetarian     Standing Status:   Standing     Number of Occurrences:   1     Order Specific Question:   Diet:     Answer:   Regular [1]     Comments:   Advance as tolerated      Order Specific Question:   Miscellaneous modifications: (optional)     Answer:   Vegetarian [13]       ACTIVITY  As tolerated.  Weight bearing as tolerated    CONSULTATIONS  General surgery -Dr. Mcconnell    PROCEDURES  Laparoscopic cholecystectomy    LABORATORY  Lab Results   Component Value Date    SODIUM 138 08/20/2021    POTASSIUM 4.3 08/20/2021    CHLORIDE 101 08/20/2021    CO2 25 08/20/2021    GLUCOSE 109 (H) 08/20/2021    BUN 15 08/20/2021    CREATININE 0.72 08/20/2021        Lab Results   Component Value Date    WBC 8.9 08/20/2021    HEMOGLOBIN 12.9 08/20/2021    HEMATOCRIT 39.5 08/20/2021    PLATELETCT 167 08/20/2021        Total time of the discharge process exceeds 36 minutes.    ==========================================================================================================  Please note that this dictation was created using voice recognition software. I have made every reasonable attempt to correct obvious errors, but there may be errors of grammar and possibly content that I did not discover before finalizing the note.    Electronically signed by:  MEDINA Gonzales, MSN, APRN, FNP-C  Hospitalist Services  [facility]  [phone]  [email or web address]  08/20/21     [id number]

## 2021-08-20 NOTE — PROGRESS NOTES
Pt in bed a&ox4,c/o pain,had bowel movements,plan of care explained to pt,pt for possible discharge.

## 2021-08-20 NOTE — CARE PLAN
Problem: Pain - Standard  Goal: Alleviation of pain or a reduction in pain to the patient’s comfort goal  Outcome: Progressing     Problem: Knowledge Deficit - Standard  Goal: Patient and family/care givers will demonstrate understanding of plan of care, disease process/condition, diagnostic tests and medications  Outcome: Progressing   The patient is Stable - Low risk of patient condition declining or worsening    Shift Goals  Clinical Goals: pain control / nausea control  Patient Goals: pain/ nausea control   Family Goals: comfort    Progress made toward(s) clinical / shift goals:      Patient is not progressing towards the following goals:

## 2021-09-13 ENCOUNTER — OFFICE VISIT (OUTPATIENT)
Dept: MEDICAL GROUP | Facility: LAB | Age: 46
End: 2021-09-13
Payer: COMMERCIAL

## 2021-09-13 ENCOUNTER — HOSPITAL ENCOUNTER (OUTPATIENT)
Facility: MEDICAL CENTER | Age: 46
End: 2021-09-13
Attending: NURSE PRACTITIONER
Payer: COMMERCIAL

## 2021-09-13 VITALS
SYSTOLIC BLOOD PRESSURE: 130 MMHG | DIASTOLIC BLOOD PRESSURE: 76 MMHG | WEIGHT: 225 LBS | BODY MASS INDEX: 39.87 KG/M2 | RESPIRATION RATE: 12 BRPM | HEIGHT: 63 IN | TEMPERATURE: 97.3 F | HEART RATE: 68 BPM | OXYGEN SATURATION: 98 %

## 2021-09-13 DIAGNOSIS — Z12.4 SCREENING FOR MALIGNANT NEOPLASM OF CERVIX: ICD-10-CM

## 2021-09-13 DIAGNOSIS — K21.9 GASTROESOPHAGEAL REFLUX DISEASE WITHOUT ESOPHAGITIS: ICD-10-CM

## 2021-09-13 DIAGNOSIS — O26.611 CHOLELITHIASIS AFFECTING PREGNANCY IN FIRST TRIMESTER, ANTEPARTUM: ICD-10-CM

## 2021-09-13 DIAGNOSIS — Z01.419 ENCOUNTER FOR GYNECOLOGICAL EXAMINATION: ICD-10-CM

## 2021-09-13 DIAGNOSIS — K80.20 CHOLELITHIASIS AFFECTING PREGNANCY IN FIRST TRIMESTER, ANTEPARTUM: ICD-10-CM

## 2021-09-13 LAB — CYTOLOGY REG CYTOL: NORMAL

## 2021-09-13 PROCEDURE — 99396 PREV VISIT EST AGE 40-64: CPT | Performed by: NURSE PRACTITIONER

## 2021-09-13 PROCEDURE — 88175 CYTOPATH C/V AUTO FLUID REDO: CPT

## 2021-09-13 ASSESSMENT — FIBROSIS 4 INDEX: FIB4 SCORE: 1.04

## 2021-09-13 NOTE — PROGRESS NOTES
Chief Complaint   Patient presents with   • Annual Exam     PAP       HPI:  Ismael is a 46 y.o.  female who presents for annual exam. Generally the patient is feeling good.  Had gallbladder out a month ago in an emergency situation - has followed up with gen surg since and is not released to work yet because of a bit of mid abdominal discomfort after tripping at home although this is slowly improving.  Bowels are moving well, denies hematuria or hematochezia.  Denies fevers or chills.  Appetite is good.  Regarding her health maintenance:   Last pap: 2016  Abnormal Pap hx: None  Periods: Amenorrheic after uterine ablation  Contraception:  had a vasectomy.  Last Mamm:  2/2022  Last colonoscopy:not applicable   Bone density test:N\A   Last Lab: due for follow up   Last Td:current   Influenza vaccination:current   Pneumococcal vaccination:not applicable   Hx STD''s: no   Regular exercise: yes    meds:   Current Outpatient Medications   Medication Sig Dispense Refill   • lisinopril-hydrochlorothiazide (PRINZIDE) 20-25 MG per tablet Take 1 tablet by mouth every day. 90 tablet 3   • Calcium-Vitamin D ([employer]S CALCIUM-600/VIT D PO) Take 1 Each by mouth every day.     • omeprazole (PRILOSEC) 20 MG delayed-release capsule TAKE ONE CAPSULE BY MOUTH ONCE DAILY 30 Cap 0     No current facility-administered medications for this visit.       Allergies: No Known Allergies    family:   Family History   Problem Relation Age of Onset   • Diabetes Mother    • Heart Disease Father    • Hypertension Father    • Stroke Father         from an TBI   • Hypertension Brother    • Diabetes Paternal Uncle    • Diabetes Maternal Grandfather    • Hypertension Paternal Grandmother        social hx:   Social History     Socioeconomic History   • Marital status:      Spouse name: Not on file   • Number of children: Not on file   • Years of education: Not on file   • Highest education level: Not on file   Occupational History   • Not on file    Tobacco Use   • Smoking status: Never Smoker   • Smokeless tobacco: Never Used   Vaping Use   • Vaping Use: Never used   Substance and Sexual Activity   • Alcohol use: Yes     Alcohol/week: 3.6 oz     Types: 6 Standard drinks or equivalent per week   • Drug use: Yes     Types: Oral     Comment: Edibles   • Sexual activity: Yes     Partners: Male     Birth control/protection: Surgical     Comment: ; two kids; self employeed    Other Topics Concern   • Not on file   Social History Narrative   • Not on file     Social Determinants of Health     Financial Resource Strain:    • Difficulty of Paying Living Expenses:    Food Insecurity:    • Worried About Running Out of Food in the Last Year:    • Ran Out of Food in the Last Year:    Transportation Needs:    • Lack of Transportation (Medical):    • Lack of Transportation (Non-Medical):    Physical Activity:    • Days of Exercise per Week:    • Minutes of Exercise per Session:    Stress:    • Feeling of Stress :    Social Connections:    • Frequency of Communication with Friends and Family:    • Frequency of Social Gatherings with Friends and Family:    • Attends Shinto Services:    • Active Member of Clubs or Organizations:    • Attends Club or Organization Meetings:    • Marital Status:    Intimate Partner Violence:    • Fear of Current or Ex-Partner:    • Emotionally Abused:    • Physically Abused:    • Sexually Abused:        ROS:  No fever, chills, sweats.   No polydipsia, polyuria, temperature intolerance, significant weight changes   No visual changes, blurred vision.  No chest pain, palpitations, peripheral swelling   No chronic cough, shortness of breath, dyspnea with exertion.   No dysphagia, odynophagia, black or bloody stools.   No abdominal pain, nausea, persistent diarrhea, constipation   No dysuria, hematuria, incontinence. Denies nocturia  No rash, pruritis, pigment changes.   No focal weakness, syncope, headache, confusion, persistent numbness.  "  All other systems are reviewed and negative.    PHYSICAL EXAMINATION:  /76 (BP Location: Left arm, Patient Position: Sitting, BP Cuff Size: Large adult)   Pulse 68   Temp 36.3 °C (97.3 °F)   Resp 12   Ht 1.6 m (5' 3\")   Wt 102 kg (225 lb)   SpO2 98%   General appearance:healthy, well developed, well nourished  Psych: alert, no distress, cooperative  Eyes: EOM's normal, pupils equal, round, reactive to light  ENT: Ears: external ears normal to inspection and palpation, TM's clear, Nose/Sinuses: nose shows no deformity, asymmetry, or inflammation  Neck: no asymmetry, masses, or scars, no adenopathy, thyroid normal to palpation  Lungs: Clear to auscultation bilaterally  Cardiovascular:regular rate and rhythm.  Breasts: normal in size and symmetry, skin normal, physiologic fibronodularity  Abdomen: She has healed incisions to her abdomen without any openings or surrounding erythema.  Musculoskeletal:ROM of all joints is normal, no evidence of joint instability  Lymphatic: None significantly enlarged  Skin: no rash, no edema  Neuro: mental status intact, cranial nerves 2-12 intact  Pelvic: external genitalia normal, cervix normal in appearance, bimanual exam reveals normal uterus, adnexa without masses or tenderness, vaginal mucosa normal       ASSESSMENT/PLAN:  1.annual physical exam: HCM:  Pap and breast exams done.  BSE technique reviewed and patient encouraged to perform self-exam monthly.   Encourage monthly self breast exam  Encourage daily exercise for at least 30 minutes  Recommend mammogram yearly.  She was referred to gastroenterology to discuss colonoscopy starting around age 47 and endoscopy because of her long history of GERD.  Recommend 1500 mg Calcium with 600 units vit d daily.    Pap smears every 3 to 5 years of today's is normal  Labs were completed in the emergency department.  May return to work as soon as pain resolves, works for herself as a hairdresser.  She is also struggling with " her insurance as they are requesting a referral for her to have her gallbladder out even though this was an emergency situation.  I did place a referral today and I encouraged her to speak again with her insurance company, reminding them that her cholecystectomy was an emergency situation.

## 2021-10-18 ENCOUNTER — APPOINTMENT (OUTPATIENT)
Dept: CARDIOLOGY | Facility: MEDICAL CENTER | Age: 46
End: 2021-10-18
Attending: NURSE PRACTITIONER
Payer: COMMERCIAL

## 2021-11-03 ENCOUNTER — TELEPHONE (OUTPATIENT)
Dept: MEDICAL GROUP | Facility: LAB | Age: 46
End: 2021-11-03

## 2021-11-03 NOTE — TELEPHONE ENCOUNTER
Patient called and states that she had spoken to you about her emergeny gallbladder removal and that she needed the referral to send to her insurance since they were denying it. She is concerned about sending this because of the diagnosis and wants to know if you could change this on the referral     O26.611,K80.20 (ICD-10-CM) - Cholelithiasis affecting pregnancy in first trimester, antepartum

## 2021-11-04 DIAGNOSIS — K80.01 CALCULUS OF GALLBLADDER WITH ACUTE CHOLECYSTITIS AND OBSTRUCTION: ICD-10-CM

## 2021-11-04 NOTE — TELEPHONE ENCOUNTER
She is incorrect about this diagnoses as she has not had a baby in 20 years if I recall correctly but you could check with her.

## 2021-11-04 NOTE — TELEPHONE ENCOUNTER
Yes she said it is incorrect and would like you to change it before she sends to her insurance. I think you may have put it there accidentally

## 2021-11-15 ENCOUNTER — HOSPITAL ENCOUNTER (OUTPATIENT)
Facility: MEDICAL CENTER | Age: 46
End: 2021-11-15
Attending: NURSE PRACTITIONER
Payer: COMMERCIAL

## 2021-11-15 ENCOUNTER — HOSPITAL ENCOUNTER (OUTPATIENT)
Dept: LAB | Facility: MEDICAL CENTER | Age: 46
End: 2021-11-15
Attending: NURSE PRACTITIONER
Payer: COMMERCIAL

## 2021-11-15 DIAGNOSIS — R23.2 HOT FLASHES: ICD-10-CM

## 2021-11-15 DIAGNOSIS — R68.82 LOW LIBIDO: ICD-10-CM

## 2021-11-15 LAB
ESTRADIOL SERPL-MCNC: 143 PG/ML
FSH SERPL-ACNC: 9.9 MIU/ML
PROGEST SERPL-MCNC: 0.17 NG/ML

## 2021-11-15 PROCEDURE — 82784 ASSAY IGA/IGD/IGG/IGM EACH: CPT

## 2021-11-15 PROCEDURE — 84144 ASSAY OF PROGESTERONE: CPT

## 2021-11-15 PROCEDURE — 87493 C DIFF AMPLIFIED PROBE: CPT

## 2021-11-15 PROCEDURE — 87329 GIARDIA AG IA: CPT

## 2021-11-15 PROCEDURE — 83001 ASSAY OF GONADOTROPIN (FSH): CPT

## 2021-11-15 PROCEDURE — 36415 COLL VENOUS BLD VENIPUNCTURE: CPT

## 2021-11-15 PROCEDURE — 82670 ASSAY OF TOTAL ESTRADIOL: CPT

## 2021-11-15 PROCEDURE — 87328 CRYPTOSPORIDIUM AG IA: CPT

## 2021-11-15 PROCEDURE — 83516 IMMUNOASSAY NONANTIBODY: CPT

## 2021-11-16 LAB
C DIFF DNA SPEC QL NAA+PROBE: NEGATIVE
C DIFF TOX GENS STL QL NAA+PROBE: NEGATIVE
G LAMBLIA+C PARVUM AG STL QL RAPID: NORMAL
SIGNIFICANT IND 70042: NORMAL
SITE SITE: NORMAL
SOURCE SOURCE: NORMAL

## 2021-11-17 ENCOUNTER — APPOINTMENT (OUTPATIENT)
Dept: RADIOLOGY | Facility: MEDICAL CENTER | Age: 46
End: 2021-11-17
Attending: EMERGENCY MEDICINE
Payer: COMMERCIAL

## 2021-11-17 ENCOUNTER — HOSPITAL ENCOUNTER (EMERGENCY)
Facility: MEDICAL CENTER | Age: 46
End: 2021-11-17
Attending: EMERGENCY MEDICINE
Payer: COMMERCIAL

## 2021-11-17 VITALS
BODY MASS INDEX: 40.16 KG/M2 | TEMPERATURE: 97 F | SYSTOLIC BLOOD PRESSURE: 144 MMHG | DIASTOLIC BLOOD PRESSURE: 79 MMHG | HEART RATE: 68 BPM | HEIGHT: 64 IN | WEIGHT: 235.23 LBS | RESPIRATION RATE: 16 BRPM | OXYGEN SATURATION: 100 %
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
## 2021-11-17 DIAGNOSIS — M54.9 PAIN, UPPER BACK: ICD-10-CM

## 2021-11-17 PROCEDURE — 700102 HCHG RX REV CODE 250 W/ 637 OVERRIDE(OP): Performed by: EMERGENCY MEDICINE

## 2021-11-17 PROCEDURE — A9270 NON-COVERED ITEM OR SERVICE: HCPCS | Performed by: EMERGENCY MEDICINE

## 2021-11-17 PROCEDURE — 72040 X-RAY EXAM NECK SPINE 2-3 VW: CPT

## 2021-11-17 PROCEDURE — 99284 EMERGENCY DEPT VISIT MOD MDM: CPT

## 2021-11-17 PROCEDURE — 72072 X-RAY EXAM THORAC SPINE 3VWS: CPT

## 2021-11-17 RX ORDER — IBUPROFEN 600 MG/1
600 TABLET ORAL ONCE
Status: COMPLETED | OUTPATIENT
Start: 2021-11-17 | End: 2021-11-17

## 2021-11-17 RX ORDER — ACETAMINOPHEN 325 MG/1
650 TABLET ORAL ONCE
Status: COMPLETED | OUTPATIENT
Start: 2021-11-17 | End: 2021-11-17

## 2021-11-17 RX ADMIN — ACETAMINOPHEN 650 MG: 325 TABLET, FILM COATED ORAL at 11:58

## 2021-11-17 RX ADMIN — IBUPROFEN 600 MG: 600 TABLET ORAL at 11:58

## 2021-11-17 ASSESSMENT — FIBROSIS 4 INDEX: FIB4 SCORE: 1.04

## 2021-11-17 NOTE — DISCHARGE INSTRUCTIONS
Dr. Levy, is the spine surgeon and he would like you to follow-up in 3 to 4 weeks.    I recommend that you take Tylenol Motrin as needed for pain    Please come back if there is worsening pain any numbness or weakness in your arms or legs.

## 2021-11-17 NOTE — ED NOTES
Assist RN  Pt medicated for neck pain rates as 4/10 per mar order. Allergies verified. Crackers provided for the pt and okay per erp per order.  Pt taken to and from xray.
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
## 2021-11-18 LAB
IGA SERPL-MCNC: 370 MG/DL (ref 68–408)
TTG IGA SER IA-ACNC: <2 U/ML (ref 0–3)

## 2021-11-30 ENCOUNTER — APPOINTMENT (OUTPATIENT)
Dept: MEDICAL GROUP | Facility: LAB | Age: 46
End: 2021-11-30
Payer: COMMERCIAL

## 2021-12-06 ENCOUNTER — OFFICE VISIT (OUTPATIENT)
Dept: MEDICAL GROUP | Facility: LAB | Age: 46
End: 2021-12-06
Payer: COMMERCIAL

## 2021-12-06 VITALS
SYSTOLIC BLOOD PRESSURE: 134 MMHG | HEART RATE: 96 BPM | DIASTOLIC BLOOD PRESSURE: 78 MMHG | HEIGHT: 63 IN | WEIGHT: 233 LBS | RESPIRATION RATE: 12 BRPM | BODY MASS INDEX: 41.29 KG/M2 | TEMPERATURE: 97.1 F | OXYGEN SATURATION: 95 %

## 2021-12-06 DIAGNOSIS — S23.9XXA THORACIC SPRAIN: ICD-10-CM

## 2021-12-06 DIAGNOSIS — M54.6 ACUTE BILATERAL THORACIC BACK PAIN: ICD-10-CM

## 2021-12-06 DIAGNOSIS — M43.9 WEDGE DEFORMITY ON X-RAY OF SPINE: ICD-10-CM
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
## 2021-12-06 DIAGNOSIS — S16.1XXD STRAIN OF NECK MUSCLE, SUBSEQUENT ENCOUNTER: ICD-10-CM

## 2021-12-06 DIAGNOSIS — S06.0X0D CONCUSSION WITHOUT LOSS OF CONSCIOUSNESS, SUBSEQUENT ENCOUNTER: ICD-10-CM

## 2021-12-06 PROCEDURE — 99214 OFFICE O/P EST MOD 30 MIN: CPT | Performed by: NURSE PRACTITIONER

## 2021-12-06 RX ORDER — GABAPENTIN 300 MG/1
300 CAPSULE ORAL EVERY EVENING
Qty: 30 CAPSULE | Refills: 0 | Status: SHIPPED | OUTPATIENT
Start: 2021-12-06 | End: 2022-04-11

## 2021-12-06 ASSESSMENT — FIBROSIS 4 INDEX: FIB4 SCORE: 1.04
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
## 2021-12-22 ENCOUNTER — TELEPHONE (OUTPATIENT)
Dept: MEDICAL GROUP | Facility: LAB | Age: 46
End: 2021-12-22

## 2021-12-22 DIAGNOSIS — S23.9XXA THORACIC SPRAIN: ICD-10-CM

## 2021-12-22 DIAGNOSIS — S16.1XXD STRAIN OF NECK MUSCLE, SUBSEQUENT ENCOUNTER: ICD-10-CM

## 2021-12-22 NOTE — TELEPHONE ENCOUNTER
----- Message from Patito Briscoe sent at 12/22/2021  7:08 AM PST -----  Regarding: Physical Therapy   Good morning,   I have been trying to schedule physical therapy, but the [facility] team said there is zero availability until mid February. I am really in need of care.  They suggested that I may need another referral to a different clinic.  I am unsure who to use.  Do have a recommendation?   Thanks  J

## 2022-02-14 ENCOUNTER — APPOINTMENT (OUTPATIENT)
Dept: PHYSICAL THERAPY | Facility: REHABILITATION | Age: 47
End: 2022-02-14
Attending: FAMILY MEDICINE
Payer: COMMERCIAL

## 2022-02-21 ENCOUNTER — APPOINTMENT (OUTPATIENT)
Dept: PHYSICAL THERAPY | Facility: REHABILITATION | Age: 47
End: 2022-02-21
Attending: FAMILY MEDICINE
Payer: COMMERCIAL

## 2022-02-23 ENCOUNTER — APPOINTMENT (OUTPATIENT)
Dept: PHYSICAL THERAPY | Facility: REHABILITATION | Age: 47
End: 2022-02-23
Attending: FAMILY MEDICINE
Payer: COMMERCIAL

## 2022-02-28 ENCOUNTER — APPOINTMENT (OUTPATIENT)
Dept: PHYSICAL THERAPY | Facility: REHABILITATION | Age: 47
End: 2022-02-28
Attending: FAMILY MEDICINE
Payer: COMMERCIAL

## 2022-03-02 ENCOUNTER — APPOINTMENT (OUTPATIENT)
Dept: PHYSICAL THERAPY | Facility: REHABILITATION | Age: 47
End: 2022-03-02
Attending: FAMILY MEDICINE
Payer: COMMERCIAL

## 2022-03-07 ENCOUNTER — APPOINTMENT (OUTPATIENT)
Dept: PHYSICAL THERAPY | Facility: REHABILITATION | Age: 47
End: 2022-03-07
Attending: FAMILY MEDICINE
Payer: COMMERCIAL

## 2022-03-09 ENCOUNTER — PATIENT MESSAGE (OUTPATIENT)
Dept: MEDICAL GROUP | Facility: LAB | Age: 47
End: 2022-03-09
Payer: COMMERCIAL

## 2022-03-09 ENCOUNTER — APPOINTMENT (OUTPATIENT)
Dept: PHYSICAL THERAPY | Facility: REHABILITATION | Age: 47
End: 2022-03-09
Attending: FAMILY MEDICINE
Payer: COMMERCIAL

## 2022-03-09 DIAGNOSIS — H65.191 ACUTE NONSUPPURATIVE OTITIS MEDIA OF RIGHT EAR: ICD-10-CM

## 2022-03-09 DIAGNOSIS — J20.8 ACUTE BRONCHITIS DUE TO OTHER SPECIFIED ORGANISMS: ICD-10-CM

## 2022-03-09 RX ORDER — ALBUTEROL SULFATE 90 UG/1
1-2 AEROSOL, METERED RESPIRATORY (INHALATION) EVERY 4 HOURS PRN
Qty: 18 G | Refills: 3 | Status: SHIPPED | OUTPATIENT
Start: 2022-03-09 | End: 2022-03-09 | Stop reason: SDUPTHER

## 2022-03-09 RX ORDER — ALBUTEROL SULFATE 90 UG/1
1-2 AEROSOL, METERED RESPIRATORY (INHALATION) EVERY 4 HOURS PRN
Qty: 18 G | Refills: 3 | Status: SHIPPED | OUTPATIENT
Start: 2022-03-09 | End: 2024-03-18

## 2022-03-09 NOTE — TELEPHONE ENCOUNTER
----- Message from Patito Briscoe sent at 3/9/2022  6:42 AM PST -----  Regarding: Inhaler  Good morning,    I need a refill on my asthma inhaler.     Thanks   Patito

## 2022-03-09 NOTE — TELEPHONE ENCOUNTER
----- Message from Patito Briscoe sent at 3/9/2022  9:37 AM PST -----  Regarding: Inhaler  Is there any way that this can be called into the [facility] on [address] please?

## 2022-03-14 ENCOUNTER — APPOINTMENT (OUTPATIENT)
Dept: PHYSICAL THERAPY | Facility: REHABILITATION | Age: 47
End: 2022-03-14
Attending: FAMILY MEDICINE
Payer: COMMERCIAL

## 2022-04-11 ENCOUNTER — OFFICE VISIT (OUTPATIENT)
Dept: URGENT CARE | Facility: CLINIC | Age: 47
End: 2022-04-11
Payer: COMMERCIAL

## 2022-04-11 ENCOUNTER — APPOINTMENT (OUTPATIENT)
Dept: RADIOLOGY | Facility: IMAGING CENTER | Age: 47
End: 2022-04-11
Attending: PHYSICIAN ASSISTANT
Payer: COMMERCIAL

## 2022-04-11 VITALS
RESPIRATION RATE: 16 BRPM | SYSTOLIC BLOOD PRESSURE: 118 MMHG | BODY MASS INDEX: 42.35 KG/M2 | TEMPERATURE: 98 F | WEIGHT: 239 LBS | HEIGHT: 63 IN | HEART RATE: 76 BPM | OXYGEN SATURATION: 98 % | DIASTOLIC BLOOD PRESSURE: 80 MMHG

## 2022-04-11 DIAGNOSIS — R05.9 COUGH: ICD-10-CM

## 2022-04-11 DIAGNOSIS — J45.901 ASTHMA WITH ACUTE EXACERBATION, UNSPECIFIED ASTHMA SEVERITY, UNSPECIFIED WHETHER PERSISTENT: ICD-10-CM

## 2022-04-11 PROCEDURE — 71046 X-RAY EXAM CHEST 2 VIEWS: CPT | Mod: TC | Performed by: RADIOLOGY

## 2022-04-11 PROCEDURE — 99214 OFFICE O/P EST MOD 30 MIN: CPT | Performed by: PHYSICIAN ASSISTANT

## 2022-04-11 RX ORDER — PREDNISONE 20 MG/1
40 TABLET ORAL DAILY
Qty: 10 TABLET | Refills: 0 | Status: SHIPPED | OUTPATIENT
Start: 2022-04-11 | End: 2022-04-16

## 2022-04-11 RX ORDER — ALBUTEROL SULFATE 2.5 MG/3ML
2.5 SOLUTION RESPIRATORY (INHALATION) EVERY 4 HOURS PRN
Qty: 300 ML | Refills: 0 | Status: SHIPPED | OUTPATIENT
Start: 2022-04-11

## 2022-04-11 RX ORDER — DICLOFENAC SODIUM 75 MG/1
75 TABLET, DELAYED RELEASE ORAL 2 TIMES DAILY
COMMUNITY
Start: 2022-01-17 | End: 2022-04-11

## 2022-04-11 RX ORDER — METHOCARBAMOL 500 MG/1
TABLET, FILM COATED ORAL
COMMUNITY
Start: 2022-01-17 | End: 2022-04-11

## 2022-04-11 ASSESSMENT — FIBROSIS 4 INDEX: FIB4 SCORE: 1.04

## 2022-04-11 NOTE — PROGRESS NOTES
Subjective:   Patito Briscoe is a 46 y.o. female who presents for Wheezing (Wheezing x 9 days, had a bad cold (longer), takes effort to talk, phlegm that is yellow in color, SOB with talking/walking, no fever, h/o asthma and had walking pneumonia. Onset 3 weeks. Tx: Mucinex, allergy medications for asthma PRN.)      HPI  Patient is a 46-year-old female who presents to clinic with complaints of a cough onset 2 weeks ago.  She states she had cold symptoms 3 weeks ago.  History of asthma.  She states the cold symptoms have gotten better, however she still has a cough, wheezing, shortness of breath.  The cough as productive yellow sputum.  She reports of wheezing mostly to her left side of her lung and discomfort to her left lung.  She has been using her albuterol inhaler about 4 times per day and albuterol nebulizer 2-3 times per week with some relief. Denies any recent fever, chills, hemoptysis, abdominal pain, vomiting, diarrhea. History of pneumonia. History of asthma. COVID vaccine up to date. No recent hospitalizations or surgeries. No malignancy. No leg swelling or leg pain. No HRT.        Medications:    • albuterol Aers  • CVS CALCIUM-600/VIT D PO  • gabapentin Caps  • omeprazole  • VITAMIN D PO    Allergies: Sulfa drugs and Tape    Problem List: Patito Briscoe does not have any pertinent problems on file.    Surgical History:  Past Surgical History:   Procedure Laterality Date   • HEVER BY LAPAROSCOPY N/A 8/18/2021    Procedure: CHOLECYSTECTOMY, LAPAROSCOPIC;  Surgeon: Jordy Mcconnell M.D.;  Location: [facility];  Service: General   • IRRIGATION & DEBRIDEMENT ORTHO Left 6/3/2020    Procedure: IRRIGATION AND DEBRIDEMENT, WOUND- FOR RING FINGER NAIL REMOVAL;  Surgeon: Guero Blankenship M.D.;  Location: [facility];  Service: Orthopedics   • MASS EXCISION ORTHO Left 6/3/2020    Procedure: EXCISION, MASS- SUBUNGUAL;  Surgeon: Guero Blankenship M.D.;  Location: [facility]  [facility];  Service: Orthopedics   • ENDOMETRIAL ABLATION      2009   • MAMMOPLASTY AUGMENTATION      age 25   • ORIF, FRACTURE, CLAVICLE     • TONSILLECTOMY         Past Social Hx: Patito Briscoe  reports that she has never smoked. She has never used smokeless tobacco. She reports current alcohol use of about 3.6 oz of alcohol per week. She reports current drug use. Drug: Oral.     Past Family Hx:  Patito Briscoe family history includes Diabetes in her maternal grandfather, mother, and paternal uncle; Heart Disease in her father; Hypertension in her brother, father, and paternal grandmother; Stroke in her father.     Problem list, medications, and allergies reviewed by myself today in Epic.     Objective:     /80 (BP Location: Left arm, Patient Position: Sitting, BP Cuff Size: Adult)   Pulse 76   Temp 36.7 °C (98 °F) (Temporal)   Resp 16   Ht 1.6 m (5' 3\")   Wt 108 kg (239 lb)   SpO2 98%   BMI 42.34 kg/m²     Physical Exam  Vitals reviewed.   Constitutional:       General: She is not in acute distress.     Appearance: Normal appearance. She is not ill-appearing or toxic-appearing.   HENT:      Head: Normocephalic.      Mouth/Throat:      Mouth: Mucous membranes are moist.      Pharynx: Oropharynx is clear.   Eyes:      Conjunctiva/sclera: Conjunctivae normal.      Pupils: Pupils are equal, round, and reactive to light.   Cardiovascular:      Rate and Rhythm: Normal rate and regular rhythm.      Heart sounds: Normal heart sounds.   Pulmonary:      Effort: Pulmonary effort is normal. No respiratory distress.      Breath sounds: Normal breath sounds. No wheezing, rhonchi or rales.   Musculoskeletal:      Cervical back: Neck supple.      Right lower leg: Normal. No swelling or tenderness. No edema.      Left lower leg: Normal. No swelling or tenderness. No edema.   Skin:     General: Skin is warm and dry.   Neurological:      General: No focal deficit present.      Mental Status: She is alert " and oriented to person, place, and time.   Psychiatric:         Mood and Affect: Mood normal.         Behavior: Behavior normal.         RADIOLOGY RESULTS   DX-CHEST-2 VIEWS    Result Date: 4/11/2022 4/11/2022 11:25 AM HISTORY/REASON FOR EXAM:  Cough TECHNIQUE/EXAM DESCRIPTION AND NUMBER OF VIEWS: Two views of the chest. COMPARISON:  8/17/2021 FINDINGS: Cardiomediastinal contour is within normal limits. No focal pulmonary consolidation. No pleural fluid collection or pneumothorax. Mild degenerative change of thoracic spine.     No acute cardiopulmonary disease.         Diagnosis and associated orders:     1. Asthma with acute exacerbation, unspecified asthma severity, unspecified whether persistent  - DX-CHEST-2 VIEWS; Future  - predniSONE (DELTASONE) 20 MG Tab; Take 2 Tablets by mouth every day for 5 days.  Dispense: 10 Tablet; Refill: 0  - albuterol (PROVENTIL) 2.5mg/3ml Nebu Soln solution for nebulization; Take 3 mL by nebulization every four hours as needed for Shortness of Breath.  Dispense: 300 mL; Refill: 0    2. Cough  - predniSONE (DELTASONE) 20 MG Tab; Take 2 Tablets by mouth every day for 5 days.  Dispense: 10 Tablet; Refill: 0    Other orders  - VITAMIN D PO; Take 1 Tablet by mouth every day.       Comments/MDM:     • This is a pleasant 46-year-old female with a history of asthma who presents to clinic with a continued cough, shortness of breath, wheezing for the past couple weeks.  She had a cold 3 weeks ago she states.  Examination is normal.  She is in no acute distress.  Vital signs are normal.  PE Wells score of 0. X-ray results per radiologist interpretation above. I personally reviewed images and radiologist report which showed no acute cardiopulmonary disease.  Discussed with patient her presenting symptoms and exam findings most likely post reactive airway and asthma exacerbation.  • Treat with prednisone for 5 days.  Albuterol nebulizer solution refilled.  She does not need a refill for her  albuterol rescue inhaler.  Increase fluid intake, over-the-counter cough medicine such as Robitussin-DM or Mucinex.  You may try nonsedating oral antihistamine.  Nasal saline washes and Flonase for any congestion.       I personally reviewed prior external notes and test results pertinent to today's visit. Red flags discussed and indications to present to the Emergency Department. Supportive care, natural history, differential diagnoses, and indications for immediate follow-up discussed. Patient expresses understanding and agrees to plan. Patient denies any other questions or concerns.     Follow-up with the primary care physician for recheck, reevaluation, and consideration of further management.    Please note that this dictation was created using voice recognition software. I have made a reasonable attempt to correct obvious errors, but I expect that there are errors of grammar and possibly content that I did not discover before finalizing the note.    This note was electronically signed by Neel Russ PA-C

## 2022-05-02 ENCOUNTER — OFFICE VISIT (OUTPATIENT)
Dept: MEDICAL GROUP | Facility: LAB | Age: 47
End: 2022-05-02
Payer: COMMERCIAL

## 2022-05-02 VITALS
WEIGHT: 241 LBS | TEMPERATURE: 97 F | HEIGHT: 63 IN | SYSTOLIC BLOOD PRESSURE: 140 MMHG | HEART RATE: 86 BPM | OXYGEN SATURATION: 96 % | DIASTOLIC BLOOD PRESSURE: 92 MMHG | RESPIRATION RATE: 12 BRPM | BODY MASS INDEX: 42.7 KG/M2

## 2022-05-02 DIAGNOSIS — J45.41 MODERATE PERSISTENT ASTHMA WITH EXACERBATION: ICD-10-CM

## 2022-05-02 DIAGNOSIS — G47.9 SLEEP DISTURBANCE: ICD-10-CM

## 2022-05-02 PROBLEM — I10 HYPERTENSION: Status: RESOLVED | Noted: 2021-08-17 | Resolved: 2022-05-02

## 2022-05-02 PROCEDURE — 99214 OFFICE O/P EST MOD 30 MIN: CPT | Performed by: NURSE PRACTITIONER

## 2022-05-02 RX ORDER — ZALEPLON 5 MG/1
5 CAPSULE ORAL NIGHTLY
Qty: 30 CAPSULE | Refills: 0 | Status: SHIPPED | OUTPATIENT
Start: 2022-05-02 | End: 2022-05-30 | Stop reason: SDUPTHER

## 2022-05-02 RX ORDER — FLUTICASONE PROPIONATE AND SALMETEROL 250; 50 UG/1; UG/1
1 POWDER RESPIRATORY (INHALATION) EVERY 12 HOURS
Qty: 60 EACH | Refills: 5 | Status: SHIPPED | OUTPATIENT
Start: 2022-05-02

## 2022-05-02 RX ORDER — MONTELUKAST SODIUM 10 MG/1
10 TABLET ORAL DAILY
Qty: 30 TABLET | Refills: 1 | Status: SHIPPED | OUTPATIENT
Start: 2022-05-02 | End: 2022-09-20

## 2022-05-02 ASSESSMENT — FIBROSIS 4 INDEX: FIB4 SCORE: 1.04

## 2022-05-02 ASSESSMENT — PATIENT HEALTH QUESTIONNAIRE - PHQ9: CLINICAL INTERPRETATION OF PHQ2 SCORE: 0

## 2022-05-02 NOTE — PROGRESS NOTES
"CC  asthma      HPI:  Ismael is a 46-year-old established female here with complaint of 4 weeks of shortness of breath, rattling, poor sleep, runny nose, congestion and cough.  Tells me that she really has not slept well in 4 weeks and would like something to help her sleep for a few weeks.  She has taken Ambien in the past which helped.  Chronic diagnoses of asthma.  Mucus is yellow.    + sinus pain.    Denies ST.    Ears feel full.    Taking generic antihistamine and flonase daily for allergies.  UC 2 weeks ago for all of above - given prednisone and had negative CXR.  Prednisone did not help.   No preventive asthma drugs.     Elevated BP readings in office:  Checking at home and systolic is always below 120.      Exam:  /92 (BP Location: Right arm, Patient Position: Sitting, BP Cuff Size: Large adult)   Pulse 86   Temp 36.1 °C (97 °F)   Resp 12   Ht 1.6 m (5' 3\")   Wt 109 kg (241 lb)   SpO2 96%   Constitutional: Alert, no distress, plus 3 vital signs  Skin:  Warm, dry, no rashes invisible areas  Eye: Equal, round and reactive, conjunctiva clear  ENMT: Bilateral tympanic membranes are retracted but translucent without erythema or perforation. Positive bilateral maxillary sinus tenderness. Oropharynx is slightly injected without exudate. No tonsillar enlargement..    Neck: Mild anterior cervical, nontender lymphadenopathy  Respiratory: Unlabored respiration.  She does have expiratory wheezing but no rhonchi or areas of decreased lung sounds.  Cardiovascular: Normal rate and rhythm, no murmur, no edema  Psych: Alert, pleasant, well-groomed, normal affect    A/P:  \"  1. Moderate persistent asthma with exacerbation  fluticasone-salmeterol (ADVAIR) 250-50 MCG/ACT AEROSOL POWDER, BREATH ACTIVATED    montelukast (SINGULAIR) 10 MG Tab   2. Sleep disturbance  zaleplon (SONATA) 5 MG capsule   \"  Asthma is a chronic issue for the patient with exacerbation and medication management needed today.  Recommend adding on " Singulair 10 mg nightly along with Advair 250 twice daily, rinsing mouth after use.  Did not respond to oral prednisone, I avoided represcribing this today.  She will let me know how she is feeling within 72 hours.  She will continue oral newer generation antihistamine in the morning and Flonase.  Trial of Sonata 5 mg about 15 minutes prior to bedtime, allowing 7 to 8 hours of sleep.  She will notify me if this is not covered by insurance or is ineffective for her.    Ultimately will follow up with each other within 72 hours regarding her sleep and asthma symptoms.  She will continue with albuterol handheld during the day and utilizing a nebulizer treatment in the morning as well as nighttime.

## 2022-05-03 PROBLEM — R03.0 ELEVATED BLOOD PRESSURE READING: Status: ACTIVE | Noted: 2022-05-03

## 2022-06-29 ENCOUNTER — TELEPHONE (OUTPATIENT)
Dept: MEDICAL GROUP | Facility: LAB | Age: 47
End: 2022-06-29
Payer: COMMERCIAL

## 2022-06-29 NOTE — TELEPHONE ENCOUNTER
MEDICATION PRIOR AUTHORIZATION NEEDED:    1. Name of Medication: phentermine hcl 37.5mg    2. Requested By (Name of Pharmacy): [facility]     3. Is insurance on file current? yes    4. What is the name & phone number of the 3rd party payor? [employer]    Key: [id number]

## 2022-07-27 DIAGNOSIS — G47.9 SLEEP DISTURBANCE: ICD-10-CM

## 2022-07-27 RX ORDER — ZALEPLON 5 MG/1
5 CAPSULE ORAL NIGHTLY
Qty: 30 CAPSULE | Refills: 1 | Status: SHIPPED | OUTPATIENT
Start: 2022-07-27 | End: 2022-08-26

## 2022-07-27 NOTE — TELEPHONE ENCOUNTER
Received request via: Patient    Was the patient seen in the last year in this department? Yes  5/2/22  Does the patient have an active prescription (recently filled or refills available) for medication(s) requested? No

## 2022-08-26 DIAGNOSIS — E66.9 CLASS 2 OBESITY WITHOUT SERIOUS COMORBIDITY IN ADULT, UNSPECIFIED BMI, UNSPECIFIED OBESITY TYPE: ICD-10-CM

## 2022-08-26 DIAGNOSIS — R53.83 LOW ENERGY: ICD-10-CM

## 2022-08-26 NOTE — TELEPHONE ENCOUNTER
Received request via: Pharmacy    Was the patient seen in the last year in this department? Yes  5/2/22  Does the patient have an active prescription (recently filled or refills available) for medication(s) requested? No

## 2022-08-29 RX ORDER — PHENTERMINE HYDROCHLORIDE 37.5 MG/1
TABLET ORAL
Qty: 30 TABLET | Refills: 1 | Status: SHIPPED | OUTPATIENT
Start: 2022-08-29 | End: 2022-09-20

## 2022-09-20 ENCOUNTER — TELEMEDICINE (OUTPATIENT)
Dept: MEDICAL GROUP | Facility: LAB | Age: 47
End: 2022-09-20
Payer: COMMERCIAL

## 2022-09-20 VITALS
WEIGHT: 235 LBS | DIASTOLIC BLOOD PRESSURE: 93 MMHG | HEART RATE: 70 BPM | BODY MASS INDEX: 41.64 KG/M2 | HEIGHT: 63 IN | SYSTOLIC BLOOD PRESSURE: 146 MMHG

## 2022-09-20 DIAGNOSIS — Z82.49 FAMILY HISTORY OF HYPERTENSION: ICD-10-CM

## 2022-09-20 DIAGNOSIS — R03.0 ELEVATED BLOOD PRESSURE READING: ICD-10-CM

## 2022-09-20 PROBLEM — E66.812 CLASS 2 OBESITY WITHOUT SERIOUS COMORBIDITY IN ADULT: Status: RESOLVED | Noted: 2018-05-21 | Resolved: 2022-09-20

## 2022-09-20 PROBLEM — E66.9 CLASS 2 OBESITY WITHOUT SERIOUS COMORBIDITY IN ADULT: Status: RESOLVED | Noted: 2018-05-21 | Resolved: 2022-09-20

## 2022-09-20 PROCEDURE — 99214 OFFICE O/P EST MOD 30 MIN: CPT | Mod: 95 | Performed by: NURSE PRACTITIONER

## 2022-09-20 RX ORDER — ZALEPLON 5 MG/1
5 CAPSULE ORAL EVERY EVENING
COMMUNITY
Start: 2022-09-14 | End: 2022-10-03

## 2022-09-20 ASSESSMENT — FIBROSIS 4 INDEX: FIB4 SCORE: 1.07

## 2022-09-20 NOTE — PROGRESS NOTES
"Virtual Visit: Established Patient   This visit was conducted via Zoom using secure and encrypted videoconferencing technology.   The patient was in their home in the state [facility].    The patient's identity was confirmed and verbal consent was obtained for this virtual visit.    Subjective:   CC:   Elevated BP readings    HPI:  Ismael is a 47 y.o. female presenting for evaluation and management of:    Possible HTN and her weight.    Elevated BP readings x a few weeks on home BP machine.  Range 128/75 - 149/92 over the past week.  Last on BP meds one year ago - able to come off with lifestyle changes / gallbladder removal.  Taking phentermine everyday for a month - 37.5 mg.   Exercising regularly.  Eating \"healthy.\"   States that she doesn't feel well with BP is high - \"crampy all over / sweaty all the time / SOB.\"    Dad, brother, uncles, PGM all have HTN.    Stress level is low.    Very frustrated with weight - would really like to try mounjaro / wegovy, etc but has struggled with insurance coverage.    Patient Active Problem List    Diagnosis Date Noted    Elevated blood pressure reading 05/03/2022    Finger mass, left 06/03/2020    Sleep disturbance 07/08/2019    Class 2 obesity without serious comorbidity in adult 05/21/2018    Generalized headaches 02/06/2017    Gastroesophageal reflux disease without esophagitis 04/25/2016    Multiple joint pain 04/25/2016    Other fatigue 04/25/2016    Diarrhea 04/25/2016    Asthma, exercise induced 03/04/2013        Objective:   BP (!) 146/93   Pulse 70   Ht 1.6 m (5' 3\")   Wt 107 kg (235 lb)      Physical Exam:  Gen: NAD  Resp: nonlabored.  Able to speak in full sentences  Psy: pleasant / cooperative.   Neuro:  Alert and oriented x 3     Assessment and Plan:   The following treatment plan was discussed:     1. Elevated blood pressure reading        2. Family history of hypertension        3. BMI 40.0-44.9, adult ([facility])          We discussed that elevated blood pressure " readings could be related to phentermine use.  She will discontinue use of phentermine.  I encouraged her to check her blood pressure a.m./p.m. at rest for 10 days, take a picture of this and send it to me via [employer] to assess if we need to restart antihypertensives.  Fortunately she has an excellent lifestyle through exercise and healthy eating and I encouraged her to continue on this route.  She does have a strong family history of hypertension and this should certainly be taken into consideration.  In terms of weight loss and use of injectable medications such as Wegovy, Saxenda or Mounjaro -I encouraged her to look into [facility] pharmacy as that has been affordable for patients when their insurance is not covering these medications.  Also discussed manufacture coupons and she will investigate this/get back to me so that I can send 1 of these 3 medications affordably for her.    Follow-up 10 days via email, sooner with any complications.

## 2022-10-02 ENCOUNTER — PATIENT MESSAGE (OUTPATIENT)
Dept: MEDICAL GROUP | Facility: LAB | Age: 47
End: 2022-10-02
Payer: COMMERCIAL

## 2022-10-02 DIAGNOSIS — G47.9 SLEEP DISTURBANCE: ICD-10-CM

## 2022-10-03 RX ORDER — ZALEPLON 5 MG/1
CAPSULE ORAL
Qty: 30 CAPSULE | Refills: 0 | Status: SHIPPED | OUTPATIENT
Start: 2022-10-03 | End: 2022-11-30

## 2022-10-03 RX ORDER — TIRZEPATIDE 2.5 MG/.5ML
2.5 INJECTION, SOLUTION SUBCUTANEOUS
Qty: 2 ML | Refills: 5 | Status: SHIPPED | OUTPATIENT
Start: 2022-10-03 | End: 2023-04-18

## 2022-10-07 ENCOUNTER — PATIENT MESSAGE (OUTPATIENT)
Dept: MEDICAL GROUP | Facility: LAB | Age: 47
End: 2022-10-07
Payer: COMMERCIAL

## 2022-10-10 RX ORDER — LOSARTAN POTASSIUM 50 MG/1
50 TABLET ORAL DAILY
Qty: 30 TABLET | Refills: 5 | Status: SHIPPED | OUTPATIENT
Start: 2022-10-10 | End: 2023-03-06

## 2022-11-20 ENCOUNTER — PATIENT MESSAGE (OUTPATIENT)
Dept: MEDICAL GROUP | Facility: LAB | Age: 47
End: 2022-11-20
Payer: COMMERCIAL

## 2022-11-21 ENCOUNTER — TELEPHONE (OUTPATIENT)
Dept: MEDICAL GROUP | Facility: LAB | Age: 47
End: 2022-11-21
Payer: COMMERCIAL

## 2022-11-21 RX ORDER — TIRZEPATIDE 5 MG/.5ML
5 INJECTION, SOLUTION SUBCUTANEOUS
Qty: 2 ML | Refills: 5 | Status: SHIPPED | OUTPATIENT
Start: 2022-11-21 | End: 2023-01-18 | Stop reason: SDUPTHER

## 2022-11-29 DIAGNOSIS — G47.9 SLEEP DISTURBANCE: ICD-10-CM

## 2022-11-30 RX ORDER — ZALEPLON 5 MG/1
CAPSULE ORAL
Qty: 30 CAPSULE | Refills: 0 | Status: SHIPPED | OUTPATIENT
Start: 2022-11-30 | End: 2022-12-28 | Stop reason: SDUPTHER

## 2022-11-30 NOTE — TELEPHONE ENCOUNTER
Received request via: Pharmacy  9/20/22  Was the patient seen in the last year in this department? Yes    Does the patient have an active prescription (recently filled or refills available) for medication(s) requested? No    Does the patient have USP Plus and need 100 day supply (blood pressure, diabetes and cholesterol meds only)? Patient does not have SCP

## 2022-12-23 ENCOUNTER — PATIENT MESSAGE (OUTPATIENT)
Dept: MEDICAL GROUP | Facility: LAB | Age: 47
End: 2022-12-23
Payer: COMMERCIAL

## 2022-12-23 DIAGNOSIS — K13.0 LIP MASS: ICD-10-CM

## 2022-12-28 DIAGNOSIS — G47.9 SLEEP DISTURBANCE: ICD-10-CM

## 2022-12-29 RX ORDER — ZALEPLON 5 MG/1
5 CAPSULE ORAL EVERY EVENING
Qty: 30 CAPSULE | Refills: 0 | Status: SHIPPED | OUTPATIENT
Start: 2022-12-29 | End: 2023-01-18

## 2022-12-29 NOTE — TELEPHONE ENCOUNTER
Received request via: Pharmacy    Was the patient seen in the last year in this department? Yes  9/20/2022  Does the patient have an active prescription (recently filled or refills available) for medication(s) requested? No    Does the patient have shelter Plus and need 100 day supply (blood pressure, diabetes and cholesterol meds only)? Patient does not have SCP

## 2023-01-18 RX ORDER — TIRZEPATIDE 5 MG/.5ML
5 INJECTION, SOLUTION SUBCUTANEOUS
Qty: 2 ML | Refills: 5 | Status: SHIPPED | OUTPATIENT
Start: 2023-01-18 | End: 2023-04-18

## 2023-01-18 NOTE — TELEPHONE ENCOUNTER
Received request via: Pharmacy    Was the patient seen in the last year in this department? Yes  9/20/2022  Does the patient have an active prescription (recently filled or refills available) for medication(s) requested? No    Does the patient have assisted Plus and need 100 day supply (blood pressure, diabetes and cholesterol meds only)? Patient does not have SCP

## 2023-02-10 ENCOUNTER — PATIENT MESSAGE (OUTPATIENT)
Dept: MEDICAL GROUP | Facility: LAB | Age: 48
End: 2023-02-10
Payer: COMMERCIAL

## 2023-02-12 RX ORDER — TIRZEPATIDE 7.5 MG/.5ML
7.5 INJECTION, SOLUTION SUBCUTANEOUS
Qty: 2 ML | Refills: 2 | Status: SHIPPED | OUTPATIENT
Start: 2023-02-12 | End: 2023-04-18

## 2023-02-15 ENCOUNTER — TELEPHONE (OUTPATIENT)
Dept: MEDICAL GROUP | Facility: LAB | Age: 48
End: 2023-02-15
Payer: COMMERCIAL

## 2023-02-15 NOTE — TELEPHONE ENCOUNTER
FINAL PRIOR AUTHORIZATION STATUS:    1.  Name of Medication & Dose: REEMA     VELASQUEZ DUFF (Key: [id number])  Rx #: [id number]  Mounjaro 5MG/0.5ML pen-injectors       Form  [employer] Electronic Prior Authorization Form (2017 NCPDP)  Created  6 days ago  Sent to Plan  less than a minute ago  Plan Response  less than a minute ago  Submit Clinical Questions  Determination  Message from Plan  The [employer] Prior Authorization Department is unable to review this request at this time. Our records indicate that this member is not eligible for prior authorizations or that the review of prior authorizations is not delegated to the [employer] Prior Authorization Department. Please review the member's insurance demographics and submit to the appropriate party. Thank you in advance.

## 2023-03-22 ENCOUNTER — TELEPHONE (OUTPATIENT)
Dept: MEDICAL GROUP | Facility: LAB | Age: 48
End: 2023-03-22
Payer: COMMERCIAL

## 2023-03-22 NOTE — TELEPHONE ENCOUNTER
MEDICATION PRIOR AUTHORIZATION NEEDED:    1. Name of Medication: Mounjaro    2. Requested By (Name of Pharmacy): [facility]     3. Is insurance on file current? yes    4. What is the name & phone number of the 3rd party payor? [id number]

## 2023-04-18 ENCOUNTER — TELEMEDICINE (OUTPATIENT)
Dept: MEDICAL GROUP | Facility: LAB | Age: 48
End: 2023-04-18
Payer: COMMERCIAL

## 2023-04-18 VITALS
HEART RATE: 81 BPM | SYSTOLIC BLOOD PRESSURE: 146 MMHG | BODY MASS INDEX: 40.22 KG/M2 | HEIGHT: 63 IN | WEIGHT: 227 LBS | DIASTOLIC BLOOD PRESSURE: 93 MMHG

## 2023-04-18 DIAGNOSIS — I10 ESSENTIAL HYPERTENSION: ICD-10-CM

## 2023-04-18 DIAGNOSIS — G47.9 SLEEP DISTURBANCE: ICD-10-CM

## 2023-04-18 PROCEDURE — 99214 OFFICE O/P EST MOD 30 MIN: CPT | Mod: 95 | Performed by: NURSE PRACTITIONER

## 2023-04-18 RX ORDER — TRAZODONE HYDROCHLORIDE 50 MG/1
25-50 TABLET ORAL NIGHTLY
Qty: 30 TABLET | Refills: 3 | Status: SHIPPED | OUTPATIENT
Start: 2023-04-18 | End: 2023-05-16

## 2023-04-18 RX ORDER — LOSARTAN POTASSIUM 50 MG/1
75 TABLET ORAL DAILY
Qty: 135 TABLET | Refills: 3
Start: 2023-04-18 | End: 2023-06-01 | Stop reason: SDUPTHER

## 2023-04-18 RX ORDER — TIRZEPATIDE 10 MG/.5ML
10 INJECTION, SOLUTION SUBCUTANEOUS
Qty: 2 ML | Refills: 5 | Status: SHIPPED | OUTPATIENT
Start: 2023-04-18 | End: 2024-03-18

## 2023-04-18 RX ORDER — ZALEPLON 5 MG/1
5 CAPSULE ORAL EVERY EVENING
COMMUNITY
Start: 2023-04-03

## 2023-04-18 ASSESSMENT — FIBROSIS 4 INDEX: FIB4 SCORE: 1.07

## 2023-04-18 NOTE — PROGRESS NOTES
Virtual Visit: Established Patient   This visit was conducted via Zoom using secure and encrypted videoconferencing technology.   The patient was in their home in the [facility].    The patient's identity was confirmed and verbal consent was obtained for this virtual visit.    Subjective:   CC:   F/u    HPI:  Ismael is a 47 y.o. female presenting for evaluation and management of:    #1-Obesity:  chronic issue. Using mounjaro 7.5 mg and feels that she could increase dose / tolerating well.  Full easily.  Bowels moving well.  Taking mtv with iron for possible hair thinning.  Not having menses d/t ablation. Not a vegetarian.  Exercise:  walking for exercise.    Last a1c 2021 - 5.4%.    Last weight 235 in fall 2022 reported by pt via telemedicine visit - down around 227lb now.    Using a coupon card.    + hx of pcos and both parents have DM.      #2- HTN:  chronic issue.  Taking losartan 50 mg daily.  Denies chest pain or leg swelling.      #3- insomnia:  chronic issue.  Sleeping well with sonata until 3 am and is then awake (take about 8:30 - 9pm.  Prefers to avoid ambien - makes her feel very weird / almost hallucinating state.  Bad dreams on melatonin.     Patient Active Problem List    Diagnosis Date Noted    Family history of hypertension 09/20/2022    BMI 40.0-44.9, adult ([facility]) 09/20/2022    Elevated blood pressure reading 05/03/2022    Finger mass, left 06/03/2020    Sleep disturbance 07/08/2019    Generalized headaches 02/06/2017    Gastroesophageal reflux disease without esophagitis 04/25/2016    Multiple joint pain 04/25/2016    Other fatigue 04/25/2016    Diarrhea 04/25/2016    Asthma, exercise induced 03/04/2013        Objective:   There were no vitals taken for this visit.    Physical Exam:  Gen: NAD  Resp: nonlabored.  Able to speak in full sentences  Psy: pleasant / cooperative.   Neuro:  Alert and oriented x 3    Assessment and Plan:   The following treatment plan was discussed:   1. BMI 40.0-44.9,  adult (HCC)  -responding well to mounjaro and coupon card is working for her at this point.  In future may need [facility] pharmacy in [place] (allowing extended coupon cards) or [email or web address] to pay cash.   -increased mounjaro to 10 mg today.  She will stay in touch with me regarding any problems at the pharmacy or with this medication.  - Tirzepatide (MOUNJARO) 10 MG/0.5ML Solution Pen-injector; Inject 10 mg under the skin every 7 days.  Dispense: 2 mL; Refill: 5    2. Essential hypertension  -Encouraged her to increase losartan to 75 mg for the next few weeks, check blood pressures regularly and go up to 100 mg if blood pressure readings are consistently staying above 135/85.  Goal blood pressure readings, especially considering her young age, would be 120/80 or below.  Encouraged her to continue with portion control, exercise and weight loss.  Encouraged a low-sodium diet.  - losartan (COZAAR) 50 MG Tab; Take 1.5 Tablets by mouth every day.  Dispense: 135 Tablet; Refill: 3    3. Sleep disturbance - hallucination with ambien; melatonin - bad dreams; sonata  - wakes up 3 am  -Unfortunately waking up after 4 hours with use of Sonata which we discussed is typical due to Sonata is short half-life.  Trial of trazodone with instructions as below.  Discussed potential for daytime sleepiness if she does not allow enough time for sleep.  Encouraged her to try trazodone for the first time on an evening in which she does not have to go to work the next day and then report back to me.  - traZODone (DESYREL) 50 MG Tab; Take 0.5-1 Tablets by mouth every evening. Take one hour before bedtime and allow 9 hours for sleep.  Dispense: 30 Tablet; Refill: 3    Side effects of all medications prescribed today were discussed with the patient including how to take the medications and proper dosage. Discussed repercussions of not taking the medications as prescribed. Instructed to call the office should she have any negative  side effects or problems with the medications.

## 2023-04-21 ENCOUNTER — TELEPHONE (OUTPATIENT)
Dept: MEDICAL GROUP | Facility: LAB | Age: 48
End: 2023-04-21
Payer: COMMERCIAL

## 2023-04-21 NOTE — TELEPHONE ENCOUNTER
MEDICATION PRIOR AUTHORIZATION NEEDED:    1. Name of Medication: Mounjaro    2. Requested By (Name of Pharmacy): [facility]     3. Is insurance on file current? yes  YES  4. What is the name & phone number of the 3rd party payor? [email or web address]       our information has been submitted to [employer] and is being reviewed. You may close this dialog, return to your dashboard, and perform other tasks. You will receive an electronic determination in [facility] within  hours; you’ll also receive a faxed copy. You can see the latest determination by locating this request on your dashboard or reopening this request. If [employer] has not responded in 120 hours, contact "[employer] at [phone].

## 2023-05-03 NOTE — TELEPHONE ENCOUNTER
Your information has been submitted to [employer] and is being reviewed. You may close this dialog, return to your dashboard, and perform other tasks. You will receive an electronic determination in [facility] [employer] within  hours; you’ll also receive a faxed copy. You can see the latest determination by locating this request on your dashboard or reopening this request. If [employer] has not responded in 120 hours, contact [employer] at [phone].

## 2023-05-15 ENCOUNTER — OFFICE VISIT (OUTPATIENT)
Dept: MEDICAL GROUP | Facility: LAB | Age: 48
End: 2023-05-15
Payer: COMMERCIAL

## 2023-05-15 VITALS
SYSTOLIC BLOOD PRESSURE: 100 MMHG | HEART RATE: 90 BPM | BODY MASS INDEX: 40.04 KG/M2 | TEMPERATURE: 97.5 F | DIASTOLIC BLOOD PRESSURE: 76 MMHG | HEIGHT: 63 IN | RESPIRATION RATE: 12 BRPM | OXYGEN SATURATION: 95 % | WEIGHT: 226 LBS

## 2023-05-15 DIAGNOSIS — J06.9 ACUTE URI: ICD-10-CM

## 2023-05-15 LAB
FLUAV RNA SPEC QL NAA+PROBE: NEGATIVE
FLUBV RNA SPEC QL NAA+PROBE: NEGATIVE
INT CON NEG: NEGATIVE
INT CON POS: POSITIVE
RSV RNA SPEC QL NAA+PROBE: NEGATIVE
S PYO AG THROAT QL: NEGATIVE
SARS-COV-2 RNA RESP QL NAA+PROBE: NEGATIVE

## 2023-05-15 PROCEDURE — 0241U POCT CEPHEID COV-2, FLU A/B, RSV - PCR: CPT | Performed by: NURSE PRACTITIONER

## 2023-05-15 PROCEDURE — 3074F SYST BP LT 130 MM HG: CPT | Performed by: NURSE PRACTITIONER

## 2023-05-15 PROCEDURE — 99213 OFFICE O/P EST LOW 20 MIN: CPT | Performed by: NURSE PRACTITIONER

## 2023-05-15 PROCEDURE — 3078F DIAST BP <80 MM HG: CPT | Performed by: NURSE PRACTITIONER

## 2023-05-15 PROCEDURE — 87880 STREP A ASSAY W/OPTIC: CPT | Performed by: NURSE PRACTITIONER

## 2023-05-15 RX ORDER — FLUCONAZOLE 150 MG/1
150 TABLET ORAL DAILY
Qty: 2 TABLET | Refills: 0 | Status: SHIPPED | OUTPATIENT
Start: 2023-05-15 | End: 2023-07-25

## 2023-05-15 RX ORDER — AMOXICILLIN AND CLAVULANATE POTASSIUM 875; 125 MG/1; MG/1
1 TABLET, FILM COATED ORAL 2 TIMES DAILY
Qty: 14 TABLET | Refills: 0 | Status: SHIPPED | OUTPATIENT
Start: 2023-05-15 | End: 2023-05-22

## 2023-05-15 ASSESSMENT — PATIENT HEALTH QUESTIONNAIRE - PHQ9: CLINICAL INTERPRETATION OF PHQ2 SCORE: 0

## 2023-05-15 ASSESSMENT — FIBROSIS 4 INDEX: FIB4 SCORE: 1.07

## 2023-05-15 NOTE — PROGRESS NOTES
"Chief Complaint   Patient presents with    Sinus Problem     Ear pain both X 2 days       HPI   47-year-old established female here with c/o new onset ST, congestion, HA, ear pain x 72 hours.  Symptoms seem to be worsening.  Feels a bit wheezy.  Mucus: Yellow.  Nonsmoker.  Does have a history of asthma.  + sick contacts.     sick as well.   Returned from [place] 3 d ago.    Two covid shots - last 2021.      Past medical, surgical, family, and social history is reviewed and updated in Epic chart by me today.   Medications and allergies reviewed and updated in Epic chart by me today.     ROS:   Denies n/v/d or abdominal pain.     Exam:  /76 (BP Location: Right arm, Patient Position: Sitting, BP Cuff Size: Large adult)   Pulse 90   Temp 36.4 °C (97.5 °F)   Resp 12   Ht 1.6 m (5' 3\")   Wt 103 kg (226 lb)   SpO2 95%     Constitutional: Alert, no distress, plus 3 vital signs  Skin:  Warm, dry, no rashes invisible areas  Eye: Equal, round and reactive, conjunctiva clear  ENMT: Left tympanic membrane bulging and slightly erythematous but not perforated.  Right tympanic membrane slightly erythematous but not bulging or perforated.  Oropharynx is slightly injected without exudate. No tonsillar enlargement.  Tonsils are surgically absent.  Neck: Mild anterior cervical, nontender lymphadenopathy  Respiratory: Unlabored respiration, lungs clear to auscultation, no wheezes, no rhonchi  Cardiovascular: Normal rate and rhythm  Psych: Alert, pleasant, well-groomed, normal affect      A/P:    1. Acute URI  POCT Rapid Strep A    POCT CEPHEID COV-2, FLU A/B, RSV - PCR      Negative poc testing for COVID, flu, RSV and strep throat.  Likely bacterial acute otitis media/sinusitis.  Started on Augmentin for 7 full days.  May use over-the-counter medications like generic Mucinex, antihistamines and decongestants as well as Tylenol and ibuprofen for comfort.  Encouraged high water intake, albuterol for wheezing and deep " breathing exercises.  ER precautions prn fever >102.5, increased WOB, onset CP, or worsening overall symptoms.

## 2023-05-16 DIAGNOSIS — G47.9 SLEEP DISTURBANCE: ICD-10-CM

## 2023-05-16 RX ORDER — TRAZODONE HYDROCHLORIDE 50 MG/1
TABLET ORAL
Qty: 90 TABLET | Refills: 0 | Status: SHIPPED | OUTPATIENT
Start: 2023-05-16 | End: 2023-07-06 | Stop reason: SDUPTHER

## 2023-06-01 DIAGNOSIS — I10 ESSENTIAL HYPERTENSION: ICD-10-CM

## 2023-06-01 RX ORDER — LOSARTAN POTASSIUM 100 MG/1
100 TABLET ORAL DAILY
Qty: 90 TABLET | Refills: 1 | Status: SHIPPED | OUTPATIENT
Start: 2023-06-01 | End: 2023-07-06 | Stop reason: SDUPTHER

## 2023-07-06 DIAGNOSIS — I10 ESSENTIAL HYPERTENSION: ICD-10-CM

## 2023-07-06 DIAGNOSIS — G47.9 SLEEP DISTURBANCE: ICD-10-CM

## 2023-07-06 RX ORDER — LOSARTAN POTASSIUM 100 MG/1
100 TABLET ORAL DAILY
Qty: 90 TABLET | Refills: 1 | Status: SHIPPED | OUTPATIENT
Start: 2023-07-06 | End: 2024-02-06

## 2023-07-06 RX ORDER — TRAZODONE HYDROCHLORIDE 50 MG/1
50 TABLET ORAL
Qty: 90 TABLET | Refills: 1 | Status: SHIPPED | OUTPATIENT
Start: 2023-07-06 | End: 2023-09-21 | Stop reason: SDUPTHER

## 2023-07-06 NOTE — TELEPHONE ENCOUNTER
Received request via: Pharmacy    Was the patient seen in the last year in this department? Yes  5/15/23  Does the patient have an active prescription (recently filled or refills available) for medication(s) requested? No    Does the patient have assisted Plus and need 100 day supply (blood pressure, diabetes and cholesterol meds only)? Medication is not for cholesterol, blood pressure or diabetes

## 2023-07-11 ENCOUNTER — PATIENT MESSAGE (OUTPATIENT)
Dept: MEDICAL GROUP | Facility: LAB | Age: 48
End: 2023-07-11
Payer: COMMERCIAL

## 2023-07-12 RX ORDER — ORAL SEMAGLUTIDE 3 MG/1
3 TABLET ORAL DAILY
Qty: 30 TABLET | Refills: 5 | Status: SHIPPED
Start: 2023-07-12 | End: 2023-09-12

## 2023-07-25 ENCOUNTER — TELEMEDICINE (OUTPATIENT)
Dept: MEDICAL GROUP | Facility: LAB | Age: 48
End: 2023-07-25
Payer: COMMERCIAL

## 2023-07-25 VITALS
SYSTOLIC BLOOD PRESSURE: 143 MMHG | DIASTOLIC BLOOD PRESSURE: 91 MMHG | WEIGHT: 228 LBS | BODY MASS INDEX: 40.4 KG/M2 | HEIGHT: 63 IN

## 2023-07-25 DIAGNOSIS — R53.83 OTHER FATIGUE: ICD-10-CM

## 2023-07-25 DIAGNOSIS — K21.9 GASTROESOPHAGEAL REFLUX DISEASE WITHOUT ESOPHAGITIS: ICD-10-CM

## 2023-07-25 DIAGNOSIS — Z00.00 PREVENTATIVE HEALTH CARE: ICD-10-CM

## 2023-07-25 DIAGNOSIS — I10 ESSENTIAL HYPERTENSION: ICD-10-CM

## 2023-07-25 DIAGNOSIS — M25.50 MULTIPLE JOINT PAIN: ICD-10-CM

## 2023-07-25 PROBLEM — R03.0 ELEVATED BLOOD PRESSURE READING: Status: RESOLVED | Noted: 2022-05-03 | Resolved: 2023-07-25

## 2023-07-25 PROCEDURE — 99214 OFFICE O/P EST MOD 30 MIN: CPT | Mod: 95 | Performed by: NURSE PRACTITIONER

## 2023-07-25 ASSESSMENT — FIBROSIS 4 INDEX: FIB4 SCORE: 1.09

## 2023-07-25 NOTE — PROGRESS NOTES
Virtual Visit: Established Patient   This visit was conducted via Zoom using secure and encrypted videoconferencing technology.   The patient was in their home in the state of [place].    The patient's identity was confirmed and verbal consent was obtained for this virtual visit.    Subjective:   CC:   F/u    HPI  Patito Briscoe is a 48 y.o. female presenting for evaluation and management of:    Obesity with family hx of DM:    Previously did well on mounjaro and now struggling to obtain this with insurance.  While injecting moujaro, was rarely hungry.  Does well on up to 10 mg mounjaro and is interested in increasing dosage if approved.  Ordered oral semalgutide from [facility] but med is coming from [facility] in a few weeks - pt is nervous about this.    Strong family history of DM in grandparents on both sides as well as both of her parents.  Pt is personally due for labs, last a1c 5.4% in 2021.     Previous weight loss tx:    -phentermine - caused elevated blood pressure.   -has not taken metformin but does not have a current DM diagnosis.     Goal of loosing about 40 lbs - would like go get down to 190lbs.     Current exercise program:  yard work 3-4 d per week for many hours.  Also goes to gym to walk on treadmill for an hour, 3 days per week.      Diet:  low salt.  Bk - one egg / turkey ramirez or skipping.  Lunch: tuna on rice cake or salad or quesidilla with low carb tortilla.  Dinner - protein / vegetable with rare carb.      Comorbid medical conditions of GERD, hypertension, exercise-induced asthma and multiple joint pain as well as sleep disturbance.    Patient Active Problem List    Diagnosis Date Noted    Family history of hypertension 09/20/2022    BMI 40.0-44.9, adult ([facility]) 09/20/2022    Elevated blood pressure reading 05/03/2022    Finger mass, left 06/03/2020    Sleep disturbance - hallucination with ambien; melatonin - bad dreams; sonata  - wakes up 3 am 07/08/2019    Generalized headaches 02/06/2017     "Gastroesophageal reflux disease without esophagitis 04/25/2016    Multiple joint pain 04/25/2016    Other fatigue 04/25/2016    Diarrhea 04/25/2016    Asthma, exercise induced 03/04/2013        Objective:   BP (!) 143/91   Ht 1.6 m (5' 3\")   Wt 103 kg (228 lb)     Physical Exam:  Gen: NAD  Resp: nonlabored.  Able to speak in full sentences  Psy: pleasant / cooperative.   Neuro:  Alert and oriented x 3      Assessment and Plan:   The following treatment plan was discussed:   \"  1. Preventative health care  Lipid Profile    HEMOGLOBIN A1C    TSH    VITAMIN D,25 HYDROXY (DEFICIENCY)    Comp Metabolic Panel    CBC WITH DIFFERENTIAL    FSH    ESTRADIOL      2. Essential hypertension        3. Other fatigue        4. Multiple joint pain        5. Gastroesophageal reflux disease without esophagitis        6. BMI 40.0-44.9, adult ([facility])          Recommend a full updated lab panel as above.  Blood pressure slightly elevated today although we were talking when she took her blood pressure on her home monitor.  She will email me if blood pressure readings at home are consistently greater than 140/90.  Goal blood pressure readings consistently 120/80 or below.  Currently on 100 mg of losartan.  Encouraged her to continue with her diligent exercise program and healthy eating to contain foods that are high in fiber, low in carbohydrates and high in lean protein.  Fortunately GERD is controlled with omeprazole.  I will call for a peer to peer for this patient as soon as her labs return.  She would certainly benefit from a 40 to 50 pound weight loss as this would likely decrease her need for antihypertensives, GERD medication, improve her energy levels and decrease her joint pain as well as prevent her from developing type 2 diabetes.  Patient does have a very strong family history of type 2 diabetes.  She has responded very well to a GLP-1 agonist in the past in terms of appetite suppression which assists her in portion control, " healthy food choices and weight loss.         Pt [phone] - peer to peer .

## 2023-08-02 ENCOUNTER — HOSPITAL ENCOUNTER (OUTPATIENT)
Dept: LAB | Facility: MEDICAL CENTER | Age: 48
End: 2023-08-02
Attending: NURSE PRACTITIONER
Payer: COMMERCIAL

## 2023-08-02 DIAGNOSIS — Z00.00 PREVENTATIVE HEALTH CARE: ICD-10-CM

## 2023-08-02 LAB
25(OH)D3 SERPL-MCNC: 54 NG/ML (ref 30–100)
ALBUMIN SERPL BCP-MCNC: 4.6 G/DL (ref 3.2–4.9)
ALBUMIN/GLOB SERPL: 1.5 G/DL
ALP SERPL-CCNC: 74 U/L (ref 30–99)
ALT SERPL-CCNC: 25 U/L (ref 2–50)
ANION GAP SERPL CALC-SCNC: 11 MMOL/L (ref 7–16)
AST SERPL-CCNC: 20 U/L (ref 12–45)
BASOPHILS # BLD AUTO: 0.5 % (ref 0–1.8)
BASOPHILS # BLD: 0.03 K/UL (ref 0–0.12)
BILIRUB SERPL-MCNC: 0.5 MG/DL (ref 0.1–1.5)
BUN SERPL-MCNC: 10 MG/DL (ref 8–22)
CALCIUM ALBUM COR SERPL-MCNC: 9.2 MG/DL (ref 8.5–10.5)
CALCIUM SERPL-MCNC: 9.7 MG/DL (ref 8.5–10.5)
CHLORIDE SERPL-SCNC: 102 MMOL/L (ref 96–112)
CHOLEST SERPL-MCNC: 251 MG/DL (ref 100–199)
CO2 SERPL-SCNC: 24 MMOL/L (ref 20–33)
CREAT SERPL-MCNC: 0.82 MG/DL (ref 0.5–1.4)
EOSINOPHIL # BLD AUTO: 0.12 K/UL (ref 0–0.51)
EOSINOPHIL NFR BLD: 2.1 % (ref 0–6.9)
ERYTHROCYTE [DISTWIDTH] IN BLOOD BY AUTOMATED COUNT: 44.5 FL (ref 35.9–50)
EST. AVERAGE GLUCOSE BLD GHB EST-MCNC: 88 MG/DL
ESTRADIOL SERPL-MCNC: 24.5 PG/ML
FASTING STATUS PATIENT QL REPORTED: NORMAL
FSH SERPL-ACNC: 16.4 MIU/ML
GFR SERPLBLD CREATININE-BSD FMLA CKD-EPI: 89 ML/MIN/1.73 M 2
GLOBULIN SER CALC-MCNC: 3.1 G/DL (ref 1.9–3.5)
GLUCOSE SERPL-MCNC: 83 MG/DL (ref 65–99)
HBA1C MFR BLD: 4.7 % (ref 4–5.6)
HCT VFR BLD AUTO: 44.7 % (ref 37–47)
HDLC SERPL-MCNC: 58 MG/DL
HGB BLD-MCNC: 15 G/DL (ref 12–16)
IMM GRANULOCYTES # BLD AUTO: 0.02 K/UL (ref 0–0.11)
IMM GRANULOCYTES NFR BLD AUTO: 0.3 % (ref 0–0.9)
LDLC SERPL CALC-MCNC: 178 MG/DL
LYMPHOCYTES # BLD AUTO: 1.44 K/UL (ref 1–4.8)
LYMPHOCYTES NFR BLD: 25 % (ref 22–41)
MCH RBC QN AUTO: 32.1 PG (ref 27–33)
MCHC RBC AUTO-ENTMCNC: 33.6 G/DL (ref 32.2–35.5)
MCV RBC AUTO: 95.7 FL (ref 81.4–97.8)
MONOCYTES # BLD AUTO: 0.39 K/UL (ref 0–0.85)
MONOCYTES NFR BLD AUTO: 6.8 % (ref 0–13.4)
NEUTROPHILS # BLD AUTO: 3.75 K/UL (ref 1.82–7.42)
NEUTROPHILS NFR BLD: 65.3 % (ref 44–72)
NRBC # BLD AUTO: 0 K/UL
NRBC BLD-RTO: 0 /100 WBC (ref 0–0.2)
PLATELET # BLD AUTO: 210 K/UL (ref 164–446)
PMV BLD AUTO: 11.8 FL (ref 9–12.9)
POTASSIUM SERPL-SCNC: 4.2 MMOL/L (ref 3.6–5.5)
PROT SERPL-MCNC: 7.7 G/DL (ref 6–8.2)
RBC # BLD AUTO: 4.67 M/UL (ref 4.2–5.4)
SODIUM SERPL-SCNC: 137 MMOL/L (ref 135–145)
TRIGL SERPL-MCNC: 76 MG/DL (ref 0–149)
TSH SERPL DL<=0.005 MIU/L-ACNC: 1.83 UIU/ML (ref 0.38–5.33)
WBC # BLD AUTO: 5.8 K/UL (ref 4.8–10.8)

## 2023-08-02 PROCEDURE — 84443 ASSAY THYROID STIM HORMONE: CPT

## 2023-08-02 PROCEDURE — 82306 VITAMIN D 25 HYDROXY: CPT

## 2023-08-02 PROCEDURE — 83001 ASSAY OF GONADOTROPIN (FSH): CPT

## 2023-08-02 PROCEDURE — 85025 COMPLETE CBC W/AUTO DIFF WBC: CPT

## 2023-08-02 PROCEDURE — 82670 ASSAY OF TOTAL ESTRADIOL: CPT

## 2023-08-02 PROCEDURE — 80053 COMPREHEN METABOLIC PANEL: CPT

## 2023-08-02 PROCEDURE — 83036 HEMOGLOBIN GLYCOSYLATED A1C: CPT

## 2023-08-02 PROCEDURE — 36415 COLL VENOUS BLD VENIPUNCTURE: CPT

## 2023-08-02 PROCEDURE — 80061 LIPID PANEL: CPT

## 2023-08-14 RX ORDER — ORAL SEMAGLUTIDE 7 MG/1
1 TABLET ORAL DAILY
Qty: 30 TABLET | Refills: 5 | Status: SHIPPED
Start: 2023-08-14 | End: 2023-09-12

## 2023-08-16 ENCOUNTER — TELEPHONE (OUTPATIENT)
Dept: MEDICAL GROUP | Facility: LAB | Age: 48
End: 2023-08-16
Payer: COMMERCIAL

## 2023-08-30 ENCOUNTER — PATIENT MESSAGE (OUTPATIENT)
Dept: MEDICAL GROUP | Facility: LAB | Age: 48
End: 2023-08-30
Payer: COMMERCIAL

## 2023-08-30 DIAGNOSIS — E55.9 VITAMIN D DEFICIENCY: ICD-10-CM

## 2023-08-30 DIAGNOSIS — E78.00 ELEVATED LDL CHOLESTEROL LEVEL: ICD-10-CM

## 2023-09-11 ENCOUNTER — PATIENT MESSAGE (OUTPATIENT)
Dept: MEDICAL GROUP | Facility: LAB | Age: 48
End: 2023-09-11
Payer: COMMERCIAL

## 2023-09-12 ENCOUNTER — PATIENT MESSAGE (OUTPATIENT)
Dept: MEDICAL GROUP | Facility: LAB | Age: 48
End: 2023-09-12
Payer: COMMERCIAL

## 2023-09-12 RX ORDER — ORAL SEMAGLUTIDE 14 MG/1
1 TABLET ORAL DAILY
Qty: 30 TABLET | Refills: 5 | Status: SHIPPED
Start: 2023-09-12 | End: 2023-09-12 | Stop reason: SDUPTHER

## 2023-09-13 RX ORDER — ORAL SEMAGLUTIDE 14 MG/1
1 TABLET ORAL DAILY
Qty: 30 TABLET | Refills: 5 | Status: SHIPPED | OUTPATIENT
Start: 2023-09-13 | End: 2023-09-20 | Stop reason: SDUPTHER

## 2023-09-14 ENCOUNTER — TELEPHONE (OUTPATIENT)
Dept: MEDICAL GROUP | Facility: LAB | Age: 48
End: 2023-09-14
Payer: COMMERCIAL

## 2023-09-14 NOTE — TELEPHONE ENCOUNTER
DOCUMENTATION OF PAR STATUS:    1. Name of Medication & Dose:  Rybelsus 14MG tablets    2. Name of Prescription Coverage Company & phone #: cover my meds    Not indicated for weight loss and no prior auth submitted

## 2023-09-20 RX ORDER — ORAL SEMAGLUTIDE 14 MG/1
1 TABLET ORAL DAILY
Qty: 90 TABLET | Refills: 1 | Status: SHIPPED | OUTPATIENT
Start: 2023-09-20 | End: 2023-12-11

## 2023-09-21 DIAGNOSIS — G47.9 SLEEP DISTURBANCE: ICD-10-CM

## 2023-09-22 RX ORDER — TRAZODONE HYDROCHLORIDE 50 MG/1
50 TABLET ORAL
Qty: 90 TABLET | Refills: 0 | Status: SHIPPED | OUTPATIENT
Start: 2023-09-22 | End: 2024-02-05

## 2023-09-29 ENCOUNTER — PATIENT MESSAGE (OUTPATIENT)
Dept: MEDICAL GROUP | Facility: LAB | Age: 48
End: 2023-09-29
Payer: COMMERCIAL

## 2023-10-05 RX ORDER — PHENTERMINE HYDROCHLORIDE 37.5 MG/1
37.5 CAPSULE ORAL EVERY MORNING
Qty: 30 CAPSULE | Refills: 2 | Status: SHIPPED | OUTPATIENT
Start: 2023-10-05 | End: 2024-01-25

## 2023-10-19 RX ORDER — NITROFURANTOIN 25; 75 MG/1; MG/1
100 CAPSULE ORAL 2 TIMES DAILY
Qty: 10 CAPSULE | Refills: 0 | Status: SHIPPED | OUTPATIENT
Start: 2023-10-19 | End: 2023-10-24

## 2023-12-11 ENCOUNTER — OFFICE VISIT (OUTPATIENT)
Dept: URGENT CARE | Facility: CLINIC | Age: 48
End: 2023-12-11
Payer: COMMERCIAL

## 2023-12-11 VITALS
SYSTOLIC BLOOD PRESSURE: 100 MMHG | TEMPERATURE: 97.9 F | BODY MASS INDEX: 35.61 KG/M2 | WEIGHT: 201 LBS | HEIGHT: 63 IN | DIASTOLIC BLOOD PRESSURE: 56 MMHG | RESPIRATION RATE: 18 BRPM | HEART RATE: 104 BPM | OXYGEN SATURATION: 97 %

## 2023-12-11 DIAGNOSIS — U07.1 COVID-19: ICD-10-CM

## 2023-12-11 DIAGNOSIS — R05.1 ACUTE COUGH: ICD-10-CM

## 2023-12-11 PROCEDURE — 3078F DIAST BP <80 MM HG: CPT | Performed by: PHYSICIAN ASSISTANT

## 2023-12-11 PROCEDURE — 99213 OFFICE O/P EST LOW 20 MIN: CPT | Performed by: PHYSICIAN ASSISTANT

## 2023-12-11 PROCEDURE — 3074F SYST BP LT 130 MM HG: CPT | Performed by: PHYSICIAN ASSISTANT

## 2023-12-11 RX ORDER — DEXTROMETHORPHAN HYDROBROMIDE AND PROMETHAZINE HYDROCHLORIDE 15; 6.25 MG/5ML; MG/5ML
5 SYRUP ORAL EVERY 4 HOURS PRN
Qty: 118 ML | Refills: 0 | Status: SHIPPED | OUTPATIENT
Start: 2023-12-11 | End: 2024-03-18

## 2023-12-11 RX ORDER — BENZONATATE 100 MG/1
100 CAPSULE ORAL 3 TIMES DAILY PRN
Qty: 20 CAPSULE | Refills: 0 | Status: SHIPPED | OUTPATIENT
Start: 2023-12-11 | End: 2024-03-18

## 2023-12-11 ASSESSMENT — VISUAL ACUITY: OU: 1

## 2023-12-11 ASSESSMENT — FIBROSIS 4 INDEX: FIB4 SCORE: 0.88

## 2023-12-11 ASSESSMENT — ENCOUNTER SYMPTOMS: FATIGUE: 1

## 2023-12-11 NOTE — PROGRESS NOTES
Subjective:   Patito Briscoe is a 48 y.o. female who presents for Coronavirus Screening (Tested positive for covid), Otalgia, and Fatigue  This is a pleasant 48 Y female who presents COVID-positive.  She reports symptom onset on Friday with significant cough, sinus pressure, nasal congestion, sore throat, chills.  She has an underlying history of asthma and hypertension.  She is vaccinated against COVID.  She currently denies shortness of breath but does feel wheezy at times.  She is having difficulty sleeping due to cough and nasal congestion.            Review of Systems   Constitutional:  Positive for fatigue.   HENT:  Positive for ear pain.        Medications:  albuterol Aers  albuterol Nebu  [employer] CALCIUM-600/VIT D PO  fluticasone-salmeterol Aepb  losartan Tabs  Mounjaro Sopn  omeprazole  Rybelsus Tabs  traZODone Tabs  VITAMIN D PO  zaleplon    Allergies:             Sulfa drugs and Tape    Surgical History:         Past Surgical History:   Procedure Laterality Date    HEVER BY LAPAROSCOPY N/A 8/18/2021    Procedure: CHOLECYSTECTOMY, LAPAROSCOPIC;  Surgeon: Jordy Mcconnell M.D.;  Location: [facility];  Service: General    IRRIGATION & DEBRIDEMENT ORTHO Left 6/3/2020    Procedure: IRRIGATION AND DEBRIDEMENT, WOUND- FOR RING FINGER NAIL REMOVAL;  Surgeon: Guero Blankenship M.D.;  Location: [facility];  Service: Orthopedics    MASS EXCISION ORTHO Left 6/3/2020    Procedure: EXCISION, MASS- SUBUNGUAL;  Surgeon: Guero Blankenship M.D.;  Location: [facility];  Service: Orthopedics    ENDOMETRIAL ABLATION      2009    MAMMOPLASTY AUGMENTATION      age 25    ORIF, FRACTURE, CLAVICLE      TONSILLECTOMY         Past Social Hx:  Patito Briscoe  reports that she has never smoked. She has never used smokeless tobacco. She reports current alcohol use of about 3.6 oz of alcohol per week. She reports current drug use. Drug: Oral.     Past Family Hx:   Patito Briscoe  "family history includes Diabetes in her maternal grandfather, mother, and paternal uncle; Heart Disease in her father; Hypertension in her brother, father, and paternal grandmother; Stroke in her father.       Problem list, medications, and allergies reviewed by myself today in Epic.     Objective:     /56 (BP Location: Left arm, Patient Position: Sitting, BP Cuff Size: Adult)   Pulse (!) 104   Temp 36.6 °C (97.9 °F) (Temporal)   Resp 18   Ht 1.6 m (5' 3\")   Wt 91.2 kg (201 lb)   SpO2 97%   BMI 35.61 kg/m²     Physical Exam  Vitals and nursing note reviewed.   Constitutional:       General: She is not in acute distress.     Appearance: She is well-developed. She is ill-appearing. She is not toxic-appearing or diaphoretic.   HENT:      Head: Normocephalic. No right periorbital erythema or left periorbital erythema.      Right Ear: Ear canal and external ear normal. No mastoid tenderness. Tympanic membrane is injected. Tympanic membrane is not perforated or bulging.      Left Ear: Ear canal and external ear normal. No mastoid tenderness. Tympanic membrane is injected. Tympanic membrane is not perforated or bulging.      Nose: Mucosal edema and rhinorrhea present.      Mouth/Throat:      Mouth: Mucous membranes are dry.      Pharynx: Uvula midline. Posterior oropharyngeal erythema present. No uvula swelling.   Eyes:      General: Vision grossly intact. No allergic shiner.     Conjunctiva/sclera: Conjunctivae normal.      Pupils: Pupils are equal, round, and reactive to light.   Cardiovascular:      Rate and Rhythm: Normal rate and regular rhythm.      Pulses: Normal pulses.      Heart sounds: Normal heart sounds. No murmur heard.  Pulmonary:      Effort: Pulmonary effort is normal. No tachypnea, accessory muscle usage, prolonged expiration or respiratory distress.      Breath sounds: Normal breath sounds and air entry. No decreased air movement. No decreased breath sounds, wheezing, rhonchi or rales.      " Comments: Lungs clear to auscultation bilaterally, no rhonchi rales or wheezes  Musculoskeletal:         General: Normal range of motion.      Cervical back: Normal range of motion and neck supple. No rigidity.   Lymphadenopathy:      Cervical: No cervical adenopathy.   Skin:     General: Skin is warm and dry.   Neurological:      Mental Status: She is alert and oriented to person, place, and time.   Psychiatric:         Behavior: Behavior is cooperative.         Assessment/Plan:     Diagnosis and Associated Orders:     1. COVID-19  - Nirmatrelvir&Ritonavir 300/100 20 x 150 MG & 10 x 100MG Tablet Therapy Pack; Take 300 mg nirmatrelvir (two 150 mg tablets) with 100 mg ritonavir (one 100 mg tablet) by mouth, with all three tablets taken together twice daily for 5 days.  Dispense: 30 Each; Refill: 0  - promethazine-dextromethorphan (PROMETHAZINE-DM) 6.25-15 MG/5ML syrup; Take 5 mL by mouth every four hours as needed for Cough.  Dispense: 118 mL; Refill: 0  - benzonatate (TESSALON) 100 MG Cap; Take 1 Capsule by mouth 3 times a day as needed for Cough.  Dispense: 20 Capsule; Refill: 0    2. Acute cough  - promethazine-dextromethorphan (PROMETHAZINE-DM) 6.25-15 MG/5ML syrup; Take 5 mL by mouth every four hours as needed for Cough.  Dispense: 118 mL; Refill: 0  - benzonatate (TESSALON) 100 MG Cap; Take 1 Capsule by mouth 3 times a day as needed for Cough.  Dispense: 20 Capsule; Refill: 0        Comments/MDM:  Patient's vital signs are reassuring and they appear hemodynamically stable and do not require higher level care at this time.  They are not hypoxic and have a normal pulmonary exam.  I discussed self isolation and ER precautions.  Patient should to proceed to ED for development of symptoms including but not limited to shortness of breath breath, respiratory distress, or worsening symptoms not manageable at home.  I instructed the patient to try to follow up with their PCP (if applicable) for follow up care  If  requested, I provided the patient with a work note to provide to their employer or school regarding returning to work and discontinuation of self isolation.  Symptomatic and supportive care:   Plenty of oral hydration and rest   Over the counter cough suppressant as directed.  Tylenol or ibuprofen for pain and fever as directed.   Warm salt water gargles for sore throat, soft foods, cool liquids.   Saline nasal spray and Flonase as a decongestant.   Infection control measures at home. Stay away from people, Hand washing, covering sneeze/cough, disinfect surfaces.   Remain home from work, school, and other populated environments.   All questions were answered and patient demonstrated verbal understanding of above.    I personally reviewed prior external notes and test results pertinent to today's visit. Supportive care, natural history, differential diagnoses, and indications for immediate follow-up discussed. Return to clinic or go to ED if symptoms worsen or persist.  Red flag symptoms discussed.  Patient/Parent/Guardian voices understanding. Follow-up with your primary care provider in 3-5 days.  All side effects of medication discussed including allergic response, GI upset, tendon injury, rash, sedation etc    Please note that this dictation was created using voice recognition software. I have made a reasonable attempt to correct obvious errors, but I expect that there are errors of grammar and possibly content that I did not discover before finalizing the note.    This note was electronically signed by Mia Molina PA-C

## 2024-01-24 NOTE — TELEPHONE ENCOUNTER
Received request via: Pharmacy    Was the patient seen in the last year in this department? Yes  7/25/23  Does the patient have an active prescription (recently filled or refills available) for medication(s) requested? No    Pharmacy Name: [facility]    Does the patient have MCFP Plus and need 100 day supply (blood pressure, diabetes and cholesterol meds only)? Medication is not for cholesterol, blood pressure or diabetes

## 2024-01-25 RX ORDER — PHENTERMINE HYDROCHLORIDE 37.5 MG/1
37.5 CAPSULE ORAL EVERY MORNING
Qty: 30 CAPSULE | Refills: 0 | Status: SHIPPED | OUTPATIENT
Start: 2024-01-25 | End: 2024-02-24

## 2024-02-04 DIAGNOSIS — I10 ESSENTIAL HYPERTENSION: ICD-10-CM

## 2024-02-05 DIAGNOSIS — G47.9 SLEEP DISTURBANCE: ICD-10-CM

## 2024-02-05 RX ORDER — TRAZODONE HYDROCHLORIDE 50 MG/1
TABLET ORAL
Qty: 90 TABLET | Refills: 3 | Status: SHIPPED | OUTPATIENT
Start: 2024-02-05

## 2024-02-05 NOTE — TELEPHONE ENCOUNTER
Received request via: Pharmacy    Was the patient seen in the last year in this department? Yes  LOV : 7/25/2023 - TELEMEDICINE   Does the patient have an active prescription (recently filled or refills available) for medication(s) requested? No    [facility]    Does the patient have detention Plus and need 100 day supply (blood pressure, diabetes and cholesterol meds only)? Patient does not have SCP

## 2024-02-05 NOTE — TELEPHONE ENCOUNTER
Received request via: Pharmacy    Was the patient seen in the last year in this department? Yes 12/11/23    Does the patient have an active prescription (recently filled or refills available) for medication(s) requested? No    Ph[facility] Name: [facility]    Does the patient have long-term Plus and need 100 day supply (blood pressure, diabetes and cholesterol meds only)? Patient does not have SCP

## 2024-02-06 RX ORDER — LOSARTAN POTASSIUM 100 MG/1
100 TABLET ORAL DAILY
Qty: 90 TABLET | Refills: 3 | Status: SHIPPED | OUTPATIENT
Start: 2024-02-06

## 2024-02-09 DIAGNOSIS — R53.83 LOW ENERGY: ICD-10-CM

## 2024-02-09 DIAGNOSIS — E66.9 CLASS 2 OBESITY WITHOUT SERIOUS COMORBIDITY IN ADULT, UNSPECIFIED BMI, UNSPECIFIED OBESITY TYPE: ICD-10-CM

## 2024-02-09 NOTE — TELEPHONE ENCOUNTER
I need to change this prescription to a tablet instead of a capsule for next month, and o going prescription refills please.

## 2024-02-12 RX ORDER — PHENTERMINE HYDROCHLORIDE 37.5 MG/1
TABLET ORAL
Qty: 30 TABLET | Refills: 1 | Status: SHIPPED | OUTPATIENT
Start: 2024-02-12 | End: 2024-03-08

## 2024-02-23 ENCOUNTER — OFFICE VISIT (OUTPATIENT)
Dept: URGENT CARE | Facility: CLINIC | Age: 49
End: 2024-02-23
Payer: COMMERCIAL

## 2024-02-23 ENCOUNTER — APPOINTMENT (OUTPATIENT)
Dept: RADIOLOGY | Facility: IMAGING CENTER | Age: 49
End: 2024-02-23
Attending: PHYSICIAN ASSISTANT
Payer: COMMERCIAL

## 2024-02-23 VITALS
HEIGHT: 63 IN | OXYGEN SATURATION: 98 % | HEART RATE: 87 BPM | RESPIRATION RATE: 16 BRPM | DIASTOLIC BLOOD PRESSURE: 66 MMHG | WEIGHT: 189 LBS | BODY MASS INDEX: 33.49 KG/M2 | SYSTOLIC BLOOD PRESSURE: 104 MMHG | TEMPERATURE: 98.2 F

## 2024-02-23 DIAGNOSIS — S69.91XA HAND INJURY, RIGHT, INITIAL ENCOUNTER: ICD-10-CM

## 2024-02-23 DIAGNOSIS — M79.642 LEFT HAND PAIN: ICD-10-CM

## 2024-02-23 DIAGNOSIS — S69.92XA INJURY OF LEFT THUMB, INITIAL ENCOUNTER: ICD-10-CM

## 2024-02-23 DIAGNOSIS — S69.91XA INJURY OF RIGHT WRIST, INITIAL ENCOUNTER: ICD-10-CM

## 2024-02-23 DIAGNOSIS — S66.911A SPRAIN AND STRAIN OF RIGHT WRIST: ICD-10-CM

## 2024-02-23 DIAGNOSIS — S66.912A STRAIN OF LEFT HAND, INITIAL ENCOUNTER: ICD-10-CM

## 2024-02-23 DIAGNOSIS — S63.501A SPRAIN AND STRAIN OF RIGHT WRIST: ICD-10-CM

## 2024-02-23 DIAGNOSIS — S66.911A HAND STRAIN, RIGHT, INITIAL ENCOUNTER: ICD-10-CM

## 2024-02-23 PROCEDURE — 73130 X-RAY EXAM OF HAND: CPT | Mod: TC,LT | Performed by: PHYSICIAN ASSISTANT

## 2024-02-23 PROCEDURE — 73110 X-RAY EXAM OF WRIST: CPT | Mod: TC,RT | Performed by: PHYSICIAN ASSISTANT

## 2024-02-23 PROCEDURE — 3078F DIAST BP <80 MM HG: CPT | Performed by: PHYSICIAN ASSISTANT

## 2024-02-23 PROCEDURE — 99214 OFFICE O/P EST MOD 30 MIN: CPT | Performed by: PHYSICIAN ASSISTANT

## 2024-02-23 PROCEDURE — 73130 X-RAY EXAM OF HAND: CPT | Mod: TC,RT | Performed by: PHYSICIAN ASSISTANT

## 2024-02-23 PROCEDURE — 3074F SYST BP LT 130 MM HG: CPT | Performed by: PHYSICIAN ASSISTANT

## 2024-02-23 RX ORDER — MELOXICAM 15 MG/1
15 TABLET ORAL DAILY
Qty: 10 TABLET | Refills: 0 | Status: SHIPPED
Start: 2024-02-23 | End: 2024-03-04

## 2024-02-23 RX ORDER — MELOXICAM 15 MG/1
15 TABLET ORAL DAILY
Qty: 10 TABLET | Refills: 0 | Status: SHIPPED | OUTPATIENT
Start: 2024-02-23 | End: 2024-02-23

## 2024-02-23 ASSESSMENT — FIBROSIS 4 INDEX: FIB4 SCORE: 0.88

## 2024-02-23 NOTE — PROGRESS NOTES
"Subjective     Patito Briscoe is a 48 y.o. female who presents with Hand Injury (Bilateral hand injury when moving boxes x8d ago. Pt reports pain in the right wrist, thumb, palm, and pinky finger. Left hand thumb and palm pain. )    HPI:  Patito Briscoe is a 48 y.o. female who presents today for evaluation of bilateral wrist/hand pain. She and her  were moving heavy boxes and furniture 8 days ago. While moving something particularly heavy/bulky she says they got their communication a bit messed up and both tried to go opposite ways. She says that when that happened the weight of the item shifted awkwardly in her hands/arms. She says it put a lot of pressure on her left hand near her thumb and a lot of pressure on her right hand/wrist, more on the ulnar aspect. She says it felt as thought multiple things were \"hyperextended\". She says this happened 8 days ago. She has been taking ibuprofen and utilizing RICE therapies but it is still quite painful.      Review of Systems   Constitutional:  Negative for chills and fever.   Gastrointestinal:  Negative for nausea and vomiting.   Musculoskeletal:  Positive for joint pain.        Bilateral hand/wrist pain   Neurological:  Negative for tingling and sensory change.             PMH:  has a past medical history of Asthma, exercise induced (3/4/2013) and Heart burn.    She has no past medical history of Diabetes.  MEDS:   Current Outpatient Medications:     phentermine (ADIPEX-P) 37.5 MG tablet, TAKE 1 TABLET BY MOUTH ONCE DAILY IN THE MORNING BEFORE BREAKFAST.  Stop if home blood pressure readings are consistently greater then 140/90 or sleep is disturbed., Disp: 30 Tablet, Rfl: 1    losartan (COZAAR) 100 MG Tab, TAKE 1 TABLET DAILY, Disp: 90 Tablet, Rfl: 3    traZODone (DESYREL) 50 MG Tab, TAKE 1 TABLET DAILY AS NEEDED FOR SLEEP, Disp: 90 Tablet, Rfl: 3    Nirmatrelvir&Ritonavir 300/100 20 x 150 MG & 10 x 100MG Tablet Therapy Pack, Take 300 mg nirmatrelvir " (two 150 mg tablets) with 100 mg ritonavir (one 100 mg tablet) by mouth, with all three tablets taken together twice daily for 5 days., Disp: 30 Each, Rfl: 0    promethazine-dextromethorphan (PROMETHAZINE-DM) 6.25-15 MG/5ML syrup, Take 5 mL by mouth every four hours as needed for Cough., Disp: 118 mL, Rfl: 0    benzonatate (TESSALON) 100 MG Cap, Take 1 Capsule by mouth 3 times a day as needed for Cough., Disp: 20 Capsule, Rfl: 0    zaleplon (SONATA) 5 MG capsule, Take 5 mg by mouth every evening., Disp: , Rfl:     fluticasone-salmeterol (ADVAIR) 250-50 MCG/ACT AEROSOL POWDER, BREATH ACTIVATED, Inhale 1 Puff every 12 hours., Disp: 60 Each, Rfl: 5    VITAMIN D PO, Take 1 Tablet by mouth every day., Disp: , Rfl:     albuterol (PROVENTIL) 2.5mg/3ml Nebu Soln solution for nebulization, Take 3 mL by nebulization every four hours as needed for Shortness of Breath., Disp: 300 mL, Rfl: 0    Calcium-Vitamin D (CVS CALCIUM-600/VIT D PO), Take 1 Each by mouth every day., Disp: , Rfl:     omeprazole (PRILOSEC) 20 MG delayed-release capsule, TAKE ONE CAPSULE BY MOUTH ONCE DAILY, Disp: 30 Cap, Rfl: 0    phentermine 37.5 MG capsule, Take 1 Capsule by mouth every morning for 30 days., Disp: 30 Capsule, Rfl: 0    Tirzepatide (MOUNJARO) 10 MG/0.5ML Solution Pen-injector, Inject 10 mg under the skin every 7 days., Disp: 2 mL, Rfl: 5    albuterol 108 (90 Base) MCG/ACT Aero Soln inhalation aerosol, Inhale 1-2 Puffs every four hours as needed for Shortness of Breath., Disp: 18 g, Rfl: 3  ALLERGIES:   Allergies   Allergen Reactions    Sulfa Drugs Hives     Internal and external    Tape      Blisters, even with paper tape  tegaderm is OK     SURGHX:   Past Surgical History:   Procedure Laterality Date    HEVER BY LAPAROSCOPY N/A 8/18/2021    Procedure: CHOLECYSTECTOMY, LAPAROSCOPIC;  Surgeon: Jordy Mcconnell M.D.;  Location: [facility];  Service: General    IRRIGATION & DEBRIDEMENT ORTHO Left 6/3/2020    Procedure: IRRIGATION AND  "DEBRIDEMENT, WOUND- FOR RING FINGER NAIL REMOVAL;  Surgeon: Guero Blankenship M.D.;  Location: SUR[facility]ERY [facility];  Service: Orthopedics    MASS EXCISION ORTHO Left 6/3/2020    Procedure: EXCISION, MASS- SUBUNGUAL;  Surgeon: Guero Blankenship M.D.;  Location: SURGERY [facility];  Service: Orthopedics    ENDOMETRIAL ABLATION      2009    MAMMOPLASTY AUGMENTATION      age 25    ORIF, FRACTURE, CLAVICLE      TONSILLECTOMY       SOCHX:  reports that she has never smoked. She has never used smokeless tobacco. She reports current alcohol use of about 3.6 oz of alcohol per week. She reports current drug use. Drug: Oral.  FH: Family history was reviewed, no pertinent findings to report      Objective     /66   Pulse 87   Temp 36.8 °C (98.2 °F) (Temporal)   Resp 16   Ht 1.6 m (5' 3\")   Wt 85.7 kg (189 lb)   SpO2 98%   BMI 33.48 kg/m²      Physical Exam  Constitutional:       General: She is not in acute distress.     Appearance: She is not diaphoretic.   HENT:      Head: Normocephalic and atraumatic.      Right Ear: External ear normal.      Left Ear: External ear normal.   Eyes:      Conjunctiva/sclera: Conjunctivae normal.      Pupils: Pupils are equal, round, and reactive to light.   Pulmonary:      Effort: Pulmonary effort is normal. No respiratory distress.   Musculoskeletal:      Cervical back: Normal range of motion.      Comments: Right wrist: Diffuse TTP on dorsum and ulnar aspect. Tenderness extends into ulnar aspect of forearm. Tenderness seems most pronounced over the ulnar styloid process. There is mildly decreased ROM with flexion and extension secondary to pain. 5/5  strength. No TTP in anatomical snuffbox.    Right hand: Trace soft tissue swelling of dorsal aspect of right hand overlying the distal 3rd-5th metacarpals. Diffuse tenderness in this region. Tenderness does not seem to extend into the phalanges themselves. Full ROM but does have some increased pain with full " flexion. Increased pain in had/fingers also noted with resisted flexion. No overlying erythema or ecchymosis.    Left wrist: Normal     Left hand: Left 1st digit is diffusely TTP extending into the thenar eminence. Full ROM with pain. No obvious soft tissue swelling noted. No overlying erythema or ecchymosis. Distal N/V intact. Cap refill < 2 seconds.   Skin:     Findings: No rash.   Neurological:      Mental Status: She is alert and oriented to person, place, and time.   Psychiatric:         Mood and Affect: Mood and affect normal.         Cognition and Memory: Memory normal.         Judgment: Judgment normal.         DX-HAND 3+ RIGHT  IMPRESSION:  No radiographic evidence of acute traumatic injury.        DX-WRIST-COMPLETE 3+ RIGHT  FINDINGS:  MINERALIZATION: Mineralization is unremarkable for age.     INJURY: No acute fracture or gross malalignment is seen.     JOINTS: No erosive arthropathy is evident.        DX-HAND 3+ LEFT  IMPRESSION:  No radiographic evidence of acute traumatic injury.        FINDINGS:  MINERALIZATION: Mineralization is unremarkable for age.     INJURY: No acute fracture or gross malalignment is seen.     JOINTS: No erosive arthropathy is evident.           IMPRESSION:  No radiographic evidence of acute traumatic injury.            *X-rays were reviewed and interpreted independently by me. I agree with the radiologist's findings     Assessment & Plan       1. Sprain and strain of right wrist  - DX-WRIST-COMPLETE 3+ RIGHT; Future  - meloxicam (MOBIC) 15 MG tablet; Take 1 Tablet by mouth every day for 10 days.  Dispense: 10 Tablet; Refill: 0    2. Hand strain, right, initial encounter  - DX-HAND 3+ RIGHT; Future  - meloxicam (MOBIC) 15 MG tablet; Take 1 Tablet by mouth every day for 10 days.  Dispense: 10 Tablet; Refill: 0    3. Injury of left thumb, initial encounter  - DX-HAND 3+ LEFT; Future  - meloxicam (MOBIC) 15 MG tablet; Take 1 Tablet by mouth every day for 10 days.  Dispense: 10  Tablet; Refill: 0    4. Strain of left hand, initial encounter  - DX-HAND 3+ LEFT; Future  - meloxicam (MOBIC) 15 MG tablet; Take 1 Tablet by mouth every day for 10 days.  Dispense: 10 Tablet; Refill: 0      Patient presented to the  today with continued complaints of bilateral wrist/hand pain after an an injury while moving heavy furniture/boxes 8 days ago. Given her persistent discomfort, x-rays were obtained. There was no evidence of any acute osseous abnormalities. BJ and exam findings are consistent with soft tissue injuries. Since she has not been getting relied with OTC measures and supportive care, we will trial a course of meloxicam. She was advised not to use other NSAIDs during this time but can use OTC acetaminophen. She may continue to apply ice/heat to the affected areas. Thumb spica braces also provided for both wrists today. Referral to orthopedics placed for more definitive management and evaluation if symptoms still fail to resolve.            Differential Diagnosis, natural history, and supportive care discussed. Return to the Urgent Care or follow up with your PCP if symptoms fail to resolve, or for any new or worsening symptoms. Emergency room precautions discussed. Patient and/or family appears understanding of information.

## 2024-02-26 ASSESSMENT — ENCOUNTER SYMPTOMS
NAUSEA: 0
CHILLS: 0
FEVER: 0
SENSORY CHANGE: 0
VOMITING: 0
TINGLING: 0

## 2024-03-18 ENCOUNTER — OFFICE VISIT (OUTPATIENT)
Dept: MEDICAL GROUP | Facility: LAB | Age: 49
End: 2024-03-18
Payer: COMMERCIAL

## 2024-03-18 VITALS
RESPIRATION RATE: 12 BRPM | HEART RATE: 80 BPM | HEIGHT: 63 IN | TEMPERATURE: 96.7 F | OXYGEN SATURATION: 95 % | WEIGHT: 191 LBS | BODY MASS INDEX: 33.84 KG/M2 | SYSTOLIC BLOOD PRESSURE: 112 MMHG | DIASTOLIC BLOOD PRESSURE: 62 MMHG

## 2024-03-18 DIAGNOSIS — Z23 NEED FOR PNEUMOCOCCAL VACCINATION: ICD-10-CM

## 2024-03-18 DIAGNOSIS — Z00.00 WELL ADULT EXAM: ICD-10-CM

## 2024-03-18 DIAGNOSIS — M79.645 BILATERAL THUMB PAIN: ICD-10-CM

## 2024-03-18 DIAGNOSIS — Z12.11 SCREENING FOR COLORECTAL CANCER: ICD-10-CM

## 2024-03-18 DIAGNOSIS — R06.83 SNORING: ICD-10-CM

## 2024-03-18 DIAGNOSIS — N95.1 MENOPAUSAL VAGINAL DRYNESS: ICD-10-CM

## 2024-03-18 DIAGNOSIS — Z23 NEED FOR HEPATITIS B VACCINATION: ICD-10-CM

## 2024-03-18 DIAGNOSIS — Z12.12 SCREENING FOR COLORECTAL CANCER: ICD-10-CM

## 2024-03-18 DIAGNOSIS — M79.644 BILATERAL THUMB PAIN: ICD-10-CM

## 2024-03-18 PROCEDURE — 3078F DIAST BP <80 MM HG: CPT | Performed by: NURSE PRACTITIONER

## 2024-03-18 PROCEDURE — 3074F SYST BP LT 130 MM HG: CPT | Performed by: NURSE PRACTITIONER

## 2024-03-18 PROCEDURE — 90472 IMMUNIZATION ADMIN EACH ADD: CPT | Performed by: NURSE PRACTITIONER

## 2024-03-18 PROCEDURE — 99396 PREV VISIT EST AGE 40-64: CPT | Mod: 25 | Performed by: NURSE PRACTITIONER

## 2024-03-18 PROCEDURE — 90471 IMMUNIZATION ADMIN: CPT | Performed by: NURSE PRACTITIONER

## 2024-03-18 PROCEDURE — 90677 PCV20 VACCINE IM: CPT | Performed by: NURSE PRACTITIONER

## 2024-03-18 PROCEDURE — 90746 HEPB VACCINE 3 DOSE ADULT IM: CPT | Performed by: NURSE PRACTITIONER

## 2024-03-18 RX ORDER — PHENTERMINE HYDROCHLORIDE 37.5 MG/1
37.5 TABLET ORAL
Qty: 90 TABLET | Refills: 0 | Status: SHIPPED | OUTPATIENT
Start: 2024-03-18 | End: 2024-06-16

## 2024-03-18 RX ORDER — ESTRADIOL 0.1 MG/G
CREAM VAGINAL
Qty: 42.5 G | Refills: 0 | Status: SHIPPED | OUTPATIENT
Start: 2024-03-18

## 2024-03-18 ASSESSMENT — PATIENT HEALTH QUESTIONNAIRE - PHQ9: CLINICAL INTERPRETATION OF PHQ2 SCORE: 0

## 2024-03-18 ASSESSMENT — FIBROSIS 4 INDEX: FIB4 SCORE: 0.88

## 2024-03-18 NOTE — PROGRESS NOTES
"Subjective     Patito Briscoe is a 48 y.o. female who presents for her annual exam.    Sleep apnea: She was told that her Hx of a TBI increases her risk for sleep apnea. Interested in at home sleep study. Falls asleep well, but wakes up 3-4 times per night. She does snore and her partner tells her she stops breathing at times. STOP BANG score of 4 (Intermediate risk).     Thumbs: She injured both thumbs while moving furniture 5 weeks ago. Xrays were negative for fracture. She wore a brace for 2 weeks which hardly helped. She is still in severe pain with any lateral movement of both thumbs and she would like to see ortho for this since she has had no improvement.    Menopause: Her LMP was in approximately 2009 - ablation due to heavy bleeding. She does not have hot flashes or night sweats anymore but she is now having severe vaginal dryness, fatigue, and trouble sleeping. She also reports frequent urination (3-4 times per night).    Her Pap is due in September and she is due for her first colonoscopy.    Her Phentermine is working great.     Objective     /62 (BP Location: Right arm, Patient Position: Sitting, BP Cuff Size: Large adult)   Pulse 80   Temp 35.9 °C (96.7 °F)   Resp 12   Ht 1.6 m (5' 3\")   Wt 86.6 kg (191 lb)   SpO2 95%   BMI 33.83 kg/m²      Physical Exam  Vitals reviewed.   Constitutional:       Appearance: Normal appearance.   HENT:      Head: Normocephalic.      Right Ear: Tympanic membrane, ear canal and external ear normal.      Left Ear: Tympanic membrane, ear canal and external ear normal.      Nose: Nose normal.      Mouth/Throat:      Mouth: Mucous membranes are moist.   Eyes:      Pupils: Pupils are equal, round, and reactive to light.   Cardiovascular:      Rate and Rhythm: Normal rate and regular rhythm.      Heart sounds: Normal heart sounds.   Pulmonary:      Effort: Pulmonary effort is normal.      Breath sounds: Normal breath sounds.   Musculoskeletal:         General: " Normal range of motion.      Cervical back: Normal range of motion.   Skin:     General: Skin is warm and dry.   Neurological:      General: No focal deficit present.      Mental Status: She is alert.   Psychiatric:         Mood and Affect: Mood normal.       Assessment & Plan   1. Well adult exam        2. Screening for colorectal cancer  Referral to GI for Colonoscopy      3. Snoring  Overnight Home Sleep Study      4. Menopausal vaginal dryness        5. Bilateral thumb pain  Referral to Orthopedics      6. Need for hepatitis B vaccination  Hep B Adult 20+      7. BMI 33.0-33.9,adult  phentermine (ADIPEX-P) 37.5 MG tablet      8. Need for pneumococcal vaccination  Pneumococcal Conjugate Vaccine 20-Valent (6 wks+)        She is due for her first colonoscopy and her pap smear this year - referral placed to GI and return fall 2024 for pap.     Referral placed for an overnight sleep study for a STOP BANG score of 4.     Referral placed for orthopedics due to a bilateral thumb injury 5 weeks ago that is not improving and she has severe thumb pain with any lateral movement. She had xrays at urgent care that showed no fractures. She wore thumb braces for 2 weeks but had no improvement in pain. She will f/u with Neyda who she has seen before.    Recommended a vaginal estrogen cream for vaginal dryness symptoms. Discussed the risk and benefits of hormone replacement therapy and the low rate of systemic absorption with a vaginal cream.     Hep B and Pnumococcal vaccines given today. Vaccine Information statements given for each vaccine administered. Discussed benefits and side effects of each vaccine given with patient /family, answered all patient /family questions     Follow up in September 2024 for a pap and annual lab draws or sooner if needed.

## 2024-03-18 NOTE — PROGRESS NOTES
"Verbal consent was acquired by the patient to use [employer] listening note generation during this visit Yes      Subjective   Patito Briscoe is a 48 y.o. female who presents for annual exam without pap.  History of Present Illness  The patient is here for her annual.    She is not due for labs yet. She is due for her Pap in 09/2024 and she is due for her first colonoscopy. She is concerned about vaginal dryness. She is interested in estrogen cream for that.  Last labs showed menopause.  Amenorrheic.      She also had a bilateral thumb injury 5 weeks ago. She had x-rays of both thumbs through [facility]. No fractures, but they are still very painful and she would like to move forward with an MRI of each due to pain with any use during the day.  Est with Dr. Blankenship.     She has been seeing another doctor who is concerned she has sleep apnea. Her STOP-Bang score was 4, which puts her at intermediate risk, so we talked about an at-home sleep study.    She is still taking the phentermine daily. She needs a refill of that.    Review of Systems  Neg except for hpi    Objective   /62 (BP Location: Right arm, Patient Position: Sitting, BP Cuff Size: Large adult)   Pulse 80   Temp 35.9 °C (96.7 °F)   Resp 12   Ht 1.6 m (5' 3\")   Wt 86.6 kg (191 lb)   SpO2 95%   Physical Exam  Well developed, well nourished female in no apparent distress.  Eyes: Conjunctivae and sclerae are clear and non-icteric. Pupils are equally round and reactive to light, extra-ocular movements intact.   ENT: Nares are patent and without discharge. Oropharynx is clear and without erythema or exudates. Buccal mucosa is moist.  Neck: Supple; there is no adenopathy. No supraclavicular adenopathy is noted.  CV: Heart is regular without murmur, rub or gallop.  Pulm: Clear to auscultation.  GI: Abdomen is soft, non-tender,  with normoactive bowel sounds in all four quadrants. No hepatosplenomegaly is appreciated. No masses are " palpated. No guarding or rebound is noted.  Psychiatric: The patient is alert and oriented in all four spheres. Mood is euthymic. Affect is appropriate for the situation.  Skin: No rashes were noted.  Musculoskeletal: Gait is normal. Patient is able to transfer from sitting position to exam table without assistance.         Assessment & Plan  1. Well adult exam.  She is not due for labs until 08/2024. She will also return in 08/2024 for her Pap. I encouraged exercise and continued efforts at weight loss. I encouraged her to see dermatology yearly.    2. BMI 33.  She does well on phentermine and has kept her BMI down from 40 to the lower 30s. I reminded her of side effects such as anxiety, insomnia, heart palpitations, all of which she will stop this medication for if they begin to occur. She preferred phentermine at her mail order.  In prescribing controlled substances to this patient, I certify that I have obtained and reviewed the medical history of Patito Briscoe. I have also made a good leslie effort to obtain applicable records from other providers who have treated the patient and records did not demonstrate any increased risk of substance abuse that would prevent me from prescribing controlled substances.     I have conducted a physical exam and documented it. I have reviewed Ms. Briscoe’s prescription history as maintained by the [place] Prescription Monitoring Program.       3. Menopausal syndrome.  She is having vaginal dryness. I prescribed vaginal estrogen to use nightly week 1 and then twice weekly as maintenance. We discussed risks versus benefits of vaginal estrogen including safety profile of topical vaginal estrogen.    4. Snoring.  She is referred for a sleep study. We discussed a home sleep study due to backup with our sleep medicine department - order faxed to [facility].      5. Bilateral thumb pain.  She is established with Dr. Faye. I encouraged her to return to him for exam and further  treatment options.    Follow-up  I will follow up with her after sleep study results return.               Please note that this dictation was created using voice recognition software. I have made every reasonable attempt to correct obvious errors, but I expect that there are errors of grammar and possibly content that I did not discover before finalizing the note.

## 2024-03-19 ENCOUNTER — TELEPHONE (OUTPATIENT)
Dept: MEDICAL GROUP | Facility: LAB | Age: 49
End: 2024-03-19
Payer: COMMERCIAL

## 2024-04-10 ENCOUNTER — TELEPHONE (OUTPATIENT)
Dept: HEALTH INFORMATION MANAGEMENT | Facility: OTHER | Age: 49
End: 2024-04-10
Payer: COMMERCIAL

## 2024-04-15 DIAGNOSIS — R06.83 SNORING: ICD-10-CM

## 2024-05-14 ENCOUNTER — OFFICE VISIT (OUTPATIENT)
Dept: MEDICAL GROUP | Facility: LAB | Age: 49
End: 2024-05-14
Payer: COMMERCIAL

## 2024-05-14 VITALS
HEIGHT: 63 IN | SYSTOLIC BLOOD PRESSURE: 100 MMHG | BODY MASS INDEX: 32.78 KG/M2 | HEART RATE: 82 BPM | DIASTOLIC BLOOD PRESSURE: 60 MMHG | TEMPERATURE: 96.7 F | RESPIRATION RATE: 16 BRPM | OXYGEN SATURATION: 98 % | WEIGHT: 185 LBS

## 2024-05-14 DIAGNOSIS — H69.91 DYSFUNCTION OF RIGHT EUSTACHIAN TUBE: ICD-10-CM

## 2024-05-14 PROCEDURE — 99213 OFFICE O/P EST LOW 20 MIN: CPT | Performed by: NURSE PRACTITIONER

## 2024-05-14 PROCEDURE — 3074F SYST BP LT 130 MM HG: CPT | Performed by: NURSE PRACTITIONER

## 2024-05-14 PROCEDURE — 3078F DIAST BP <80 MM HG: CPT | Performed by: NURSE PRACTITIONER

## 2024-05-14 ASSESSMENT — FIBROSIS 4 INDEX: FIB4 SCORE: 0.88

## 2024-05-14 NOTE — PROGRESS NOTES
"Verbal consent was acquired by the patient to use [employer] ambient listening note generation during this visit Yes      Subjective   Patito Briscoe is a 48 y.o. female who presents for right ear fullness / noises in ear.   History of Present Illness  The patient is a 48-year-old established female who presents for evaluation of ear clogging.    The patient recounts an [place] - immediately feeling right ear fullness  /clogging.   Following this activity, she experienced a persistent sensation of blockage in her ear, specifically at the top of the bridge. Currently, she reports a sensation akin to a microphone in a concert lizarraga, which she suspects may be related to her auditory blood flow. She also recalls a recent incident at [facility] where she experienced an elevated heartbeat in her ear while climbing stairs. She recalls a similar episode during a trip to [place] in the past years, during which she was advised to use Afrin for sinus clearance and to take NyQuil at night. Although this treatment provided some relief, the current episode has not been as effective. She has a history of allergies.  She has Flonase at home.     Review of Systems  Neg except hpi  Objective   /60 (BP Location: Right arm, Patient Position: Sitting, BP Cuff Size: Large adult)   Pulse 82   Temp 35.9 °C (96.7 °F)   Resp 16   Ht 1.6 m (5' 3\")   Wt 83.9 kg (185 lb)   SpO2 98%   Physical Exam  Bilateral Middle ear effusion is present. The ear canal appears normal and the eardrum is intact and not erythematous.   The back of the throat appears normal without exudate / erythema.  Nasal mucosa is not boggy or inflamed  Gen:  NAD, WD, WN.         Assessment & Plan  1. Eustachian tube dysfunction.  The patient's symptoms are indicative of fluid in inner ear. The patient was advised to procure generic Claritin-D 12-hour, to be taken once in the morning, to " alleviate her ear congestion over a period of approximately 5 days. Additionally, she was advised to administer Flonase, 2 sprays in each nostril once daily in the morning for the ensuing 5 days. The potential side effects of Flonase were thoroughly discussed with the patient. Should there be no improvement in her condition next week she will notify me.  Encouraged blowing nose in humid shower and staying well hydrated.                Please note that this dictation was created using voice recognition software. I have made every reasonable attempt to correct obvious errors, but I expect that there are errors of grammar and possibly content that I did not discover before finalizing the note.

## 2024-06-07 NOTE — TELEPHONE ENCOUNTER
Received request via: Pharmacy    Was the patient seen in the last year in this department? Yes5/14/24    Does the patient have an active prescription (recently filled or refills available) for medication(s) requested? No    Pharmacy Name: mail    Does the patient have [facility] Plus and need 100 day supply (blood pressure, diabetes and cholesterol meds only)? Medication is not for cholesterol, blood pressure or diabetes

## 2024-06-10 RX ORDER — PHENTERMINE HYDROCHLORIDE 37.5 MG/1
37.5 TABLET ORAL
Qty: 90 TABLET | Refills: 0 | Status: SHIPPED | OUTPATIENT
Start: 2024-06-18 | End: 2024-09-16

## 2024-09-03 RX ORDER — PHENTERMINE HYDROCHLORIDE 37.5 MG/1
37.5 TABLET ORAL
Qty: 90 TABLET | Refills: 0 | Status: SHIPPED | OUTPATIENT
Start: 2024-09-03 | End: 2024-12-02

## 2024-09-26 RX ORDER — NITROFURANTOIN 25; 75 MG/1; MG/1
100 CAPSULE ORAL 2 TIMES DAILY
Qty: 10 CAPSULE | Refills: 0 | Status: SHIPPED | OUTPATIENT
Start: 2024-09-26

## 2024-11-19 NOTE — TELEPHONE ENCOUNTER
Received request via: Pharmacy    Was the patient seen in the last year in this department? Yes    Does the patient have an active prescription (recently filled or refills available) for medication(s) requested? No    Pharmacy Name: MAIL    Does the patient have [facility] and need 100-day supply? (This applies to ALL medications) Patient does not have SCP

## 2024-11-20 RX ORDER — PHENTERMINE HYDROCHLORIDE 37.5 MG/1
37.5 TABLET ORAL
Qty: 30 TABLET | Refills: 0 | Status: SHIPPED | OUTPATIENT
Start: 2024-11-20 | End: 2024-12-20

## 2024-12-12 ENCOUNTER — APPOINTMENT (OUTPATIENT)
Dept: URBAN - METROPOLITAN AREA CLINIC 6 | Facility: CLINIC | Age: 49
Setting detail: DERMATOLOGY
End: 2024-12-12

## 2024-12-12 DIAGNOSIS — L81.8 OTHER SPECIFIED DISORDERS OF PIGMENTATION: ICD-10-CM

## 2024-12-12 DIAGNOSIS — L70.8 OTHER ACNE: ICD-10-CM

## 2024-12-12 DIAGNOSIS — Z71.89 OTHER SPECIFIED COUNSELING: ICD-10-CM

## 2024-12-12 DIAGNOSIS — I83.9 ASYMPTOMATIC VARICOSE VEINS OF LOWER EXTREMITIES: ICD-10-CM

## 2024-12-12 DIAGNOSIS — D22 MELANOCYTIC NEVI: ICD-10-CM

## 2024-12-12 DIAGNOSIS — D18.0 HEMANGIOMA: ICD-10-CM

## 2024-12-12 DIAGNOSIS — L98.8 OTHER SPECIFIED DISORDERS OF THE SKIN AND SUBCUTANEOUS TISSUE: ICD-10-CM

## 2024-12-12 DIAGNOSIS — L81.4 OTHER MELANIN HYPERPIGMENTATION: ICD-10-CM

## 2024-12-12 DIAGNOSIS — L73.8 OTHER SPECIFIED FOLLICULAR DISORDERS: ICD-10-CM

## 2024-12-12 DIAGNOSIS — L30.9 DERMATITIS, UNSPECIFIED: ICD-10-CM

## 2024-12-12 DIAGNOSIS — L82.1 OTHER SEBORRHEIC KERATOSIS: ICD-10-CM

## 2024-12-12 PROBLEM — D22.5 MELANOCYTIC NEVI OF TRUNK: Status: ACTIVE | Noted: 2024-12-12

## 2024-12-12 PROBLEM — D23.61 OTHER BENIGN NEOPLASM OF SKIN OF RIGHT UPPER LIMB, INCLUDING SHOULDER: Status: ACTIVE | Noted: 2024-12-12

## 2024-12-12 PROBLEM — I83.93 ASYMPTOMATIC VARICOSE VEINS OF BILATERAL LOWER EXTREMITIES: Status: ACTIVE | Noted: 2024-12-12

## 2024-12-12 PROBLEM — D18.01 HEMANGIOMA OF SKIN AND SUBCUTANEOUS TISSUE: Status: ACTIVE | Noted: 2024-12-12

## 2024-12-12 PROCEDURE — ? MEDICATION COUNSELING

## 2024-12-12 PROCEDURE — 99204 OFFICE O/P NEW MOD 45 MIN: CPT

## 2024-12-12 PROCEDURE — ? SUNSCREEN RECOMMENDATIONS

## 2024-12-12 PROCEDURE — ? COUNSELING

## 2024-12-12 PROCEDURE — ? PRESCRIPTION

## 2024-12-12 RX ORDER — PIMECROLIMUS 10 MG/G
CREAM TOPICAL
Qty: 30 | Refills: 3 | Status: ERX | COMMUNITY
Start: 2024-12-12

## 2024-12-12 RX ORDER — TRETIONIN 0.25 MG/G
CREAM TOPICAL QHS
Qty: 45 | Refills: 6 | Status: ERX | COMMUNITY
Start: 2024-12-12

## 2024-12-12 RX ADMIN — PIMECROLIMUS: 10 CREAM TOPICAL at 00:00

## 2024-12-12 RX ADMIN — TRETIONIN: 0.25 CREAM TOPICAL at 00:00

## 2024-12-12 ASSESSMENT — LOCATION SIMPLE DESCRIPTION DERM
LOCATION SIMPLE: RIGHT PLANTAR SURFACE
LOCATION SIMPLE: CHEST
LOCATION SIMPLE: RIGHT BREAST
LOCATION SIMPLE: RIGHT THIGH
LOCATION SIMPLE: RIGHT TEMPLE
LOCATION SIMPLE: ABDOMEN
LOCATION SIMPLE: RIGHT LOWER LABIAL MUCOSA
LOCATION SIMPLE: LEFT FOREHEAD
LOCATION SIMPLE: RIGHT CHEEK
LOCATION SIMPLE: RIGHT FOREHEAD
LOCATION SIMPLE: GLABELLA
LOCATION SIMPLE: LEFT CHEEK
LOCATION SIMPLE: LEFT THIGH
LOCATION SIMPLE: LEFT HAND
LOCATION SIMPLE: RIGHT TEMPLE
LOCATION SIMPLE: NOSE
LOCATION SIMPLE: RIGHT HAND
LOCATION SIMPLE: CHIN
LOCATION SIMPLE: RIGHT NOSE
LOCATION SIMPLE: RIGHT KNEE

## 2024-12-12 ASSESSMENT — LOCATION DETAILED DESCRIPTION DERM
LOCATION DETAILED: RIGHT MEDIAL SUPERIOR CHEST
LOCATION DETAILED: LEFT ANTERIOR PROXIMAL THIGH
LOCATION DETAILED: RIGHT CENTRAL BUCCAL CHEEK
LOCATION DETAILED: LEFT INFERIOR MEDIAL MALAR CHEEK
LOCATION DETAILED: LEFT INFERIOR LATERAL FOREHEAD
LOCATION DETAILED: RIGHT NASAL SIDEWALL
LOCATION DETAILED: EPIGASTRIC SKIN
LOCATION DETAILED: LEFT INFERIOR CENTRAL MALAR CHEEK
LOCATION DETAILED: LEFT ULNAR DORSAL HAND
LOCATION DETAILED: RIGHT CENTRAL TEMPLE
LOCATION DETAILED: RIGHT RADIAL DORSAL HAND
LOCATION DETAILED: LEFT CENTRAL MALAR CHEEK
LOCATION DETAILED: RIGHT MEDIAL BREAST 4-5:00 REGION
LOCATION DETAILED: LEFT MEDIAL FOREHEAD
LOCATION DETAILED: LEFT ANTERIOR DISTAL THIGH
LOCATION DETAILED: PERIUMBILICAL SKIN
LOCATION DETAILED: LEFT CENTRAL BUCCAL CHEEK
LOCATION DETAILED: RIGHT CENTRAL MALAR CHEEK
LOCATION DETAILED: RIGHT CHIN
LOCATION DETAILED: NASAL DORSUM
LOCATION DETAILED: RIGHT KNEE
LOCATION DETAILED: RIGHT LOWER LABIAL MUCOSA
LOCATION DETAILED: RIGHT CENTRAL TEMPLE
LOCATION DETAILED: RIGHT INFERIOR CENTRAL MALAR CHEEK
LOCATION DETAILED: GLABELLA
LOCATION DETAILED: RIGHT INFERIOR FOREHEAD
LOCATION DETAILED: RIGHT ANTERIOR PROXIMAL THIGH
LOCATION DETAILED: RIGHT ARCH

## 2024-12-12 ASSESSMENT — LOCATION ZONE DERM
LOCATION ZONE: LIP
LOCATION ZONE: LEG
LOCATION ZONE: FACE
LOCATION ZONE: TRUNK
LOCATION ZONE: FEET
LOCATION ZONE: HAND
LOCATION ZONE: FACE
LOCATION ZONE: NOSE

## 2024-12-12 NOTE — PROCEDURE: COUNSELING
Detail Level: Generalized
Detail Level: Zone
Detail Level: Detailed
Tetracycline Counseling: Patient counseled regarding possible photosensitivity and increased risk for sunburn.  Patient instructed to avoid sunlight, if possible.  When exposed to sunlight, patients should wear protective clothing, sunglasses, and sunscreen.  The patient was instructed to call the office immediately if the following severe adverse effects occur:  hearing changes, easy bruising/bleeding, severe headache, or vision changes.  The patient verbalized understanding of the proper use and possible adverse effects of tetracycline.  All of the patient's questions and concerns were addressed. Patient understands to avoid pregnancy while on therapy due to potential birth defects.
Dapsone Counseling: I discussed with the patient the risks of dapsone including but not limited to hemolytic anemia, agranulocytosis, rashes, methemoglobinemia, kidney failure, peripheral neuropathy, headaches, GI upset, and liver toxicity.  Patients who start dapsone require monitoring including baseline LFTs and weekly CBCs for the first month, then every month thereafter.  The patient verbalized understanding of the proper use and possible adverse effects of dapsone.  All of the patient's questions and concerns were addressed.
Benzoyl Peroxide Pregnancy And Lactation Text: This medication is Pregnancy Category C. It is unknown if benzoyl peroxide is excreted in breast milk.
Topical Sulfur Applications Counseling: Topical Sulfur Counseling: Patient counseled that this medication may cause skin irritation or allergic reactions.  In the event of skin irritation, the patient was advised to reduce the amount of the drug applied or use it less frequently.   The patient verbalized understanding of the proper use and possible adverse effects of topical sulfur application.  All of the patient's questions and concerns were addressed.
High Dose Vitamin A Pregnancy And Lactation Text: High dose vitamin A therapy is contraindicated during pregnancy and breast feeding.
Doxycycline Counseling:  Patient counseled regarding possible photosensitivity and increased risk for sunburn.  Patient instructed to avoid sunlight, if possible.  When exposed to sunlight, patients should wear protective clothing, sunglasses, and sunscreen.  The patient was instructed to call the office immediately if the following severe adverse effects occur:  hearing changes, easy bruising/bleeding, severe headache, or vision changes.  The patient verbalized understanding of the proper use and possible adverse effects of doxycycline.  All of the patient's questions and concerns were addressed.
Azithromycin Pregnancy And Lactation Text: This medication is considered safe during pregnancy and is also secreted in breast milk.
Winlevi Counseling:  I discussed with the patient the risks of topical clascoterone including but not limited to erythema, scaling, itching, and stinging. Patient voiced their understanding.
Include Pregnancy/Lactation Warning?: No
Aklief counseling:  Patient advised to apply a pea-sized amount only at bedtime and wait 30 minutes after washing their face before applying.  If too drying, patient may add a non-comedogenic moisturizer.  The most commonly reported side effects including irritation, redness, scaling, dryness, stinging, burning, itching, and increased risk of sunburn.  The patient verbalized understanding of the proper use and possible adverse effects of retinoids.  All of the patient's questions and concerns were addressed.
Minocycline Pregnancy And Lactation Text: This medication is Pregnancy Category D and not consider safe during pregnancy. It is also excreted in breast milk.
Topical Retinoid Pregnancy And Lactation Text: This medication is Pregnancy Category C. It is unknown if this medication is excreted in breast milk.
Sarecycline Counseling: Patient advised regarding possible photosensitivity and discoloration of the teeth, skin, lips, tongue and gums.  Patient instructed to avoid sunlight, if possible.  When exposed to sunlight, patients should wear protective clothing, sunglasses, and sunscreen.  The patient was instructed to call the office immediately if the following severe adverse effects occur:  hearing changes, easy bruising/bleeding, severe headache, or vision changes.  The patient verbalized understanding of the proper use and possible adverse effects of sarecycline.  All of the patient's questions and concerns were addressed.
Tazorac Counseling:  Patient advised that medication is irritating and drying.  Patient may need to apply sparingly and wash off after an hour before eventually leaving it on overnight.  The patient verbalized understanding of the proper use and possible adverse effects of tazorac.  All of the patient's questions and concerns were addressed.
Aklief Pregnancy And Lactation Text: It is unknown if this medication is safe to use during pregnancy.  It is unknown if this medication is excreted in breast milk.  Breastfeeding women should use the topical cream on the smallest area of the skin for the shortest time needed while breastfeeding.  Do not apply to nipple and areola.
Erythromycin Pregnancy And Lactation Text: This medication is Pregnancy Category B and is considered safe during pregnancy. It is also excreted in breast milk.
Topical Clindamycin Counseling: Patient counseled that this medication may cause skin irritation or allergic reactions.  In the event of skin irritation, the patient was advised to reduce the amount of the drug applied or use it less frequently.   The patient verbalized understanding of the proper use and possible adverse effects of clindamycin.  All of the patient's questions and concerns were addressed.
Birth Control Pills Counseling: Birth Control Pill Counseling: I discussed with the patient the potential side effects of OCPs including but not limited to increased risk of stroke, heart attack, thrombophlebitis, deep venous thrombosis, hepatic adenomas, breast changes, GI upset, headaches, and depression.  The patient verbalized understanding of the proper use and possible adverse effects of OCPs. All of the patient's questions and concerns were addressed.
Azelaic Acid Pregnancy And Lactation Text: This medication is considered safe during pregnancy and breast feeding.
Isotretinoin Pregnancy And Lactation Text: This medication is Pregnancy Category X and is considered extremely dangerous during pregnancy. It is unknown if it is excreted in breast milk.
Spironolactone Counseling: Patient advised regarding risks of diarrhea, abdominal pain, hyperkalemia, birth defects (for female patients), liver toxicity and renal toxicity. The patient may need blood work to monitor liver and kidney function and potassium levels while on therapy. The patient verbalized understanding of the proper use and possible adverse effects of spironolactone.  All of the patient's questions and concerns were addressed.
Dapsone Pregnancy And Lactation Text: This medication is Pregnancy Category C and is not considered safe during pregnancy or breast feeding.
High Dose Vitamin A Counseling: Side effects reviewed, pt to contact office should one occur.
Spironolactone Pregnancy And Lactation Text: This medication can cause feminization of the male fetus and should be avoided during pregnancy. The active metabolite is also found in breast milk.
Winlevi Pregnancy And Lactation Text: This medication is considered safe during pregnancy and breastfeeding.
Topical Retinoid counseling:  Patient advised to apply a pea-sized amount only at bedtime and wait 30 minutes after washing their face before applying.  If too drying, patient may add a non-comedogenic moisturizer. The patient verbalized understanding of the proper use and possible adverse effects of retinoids.  All of the patient's questions and concerns were addressed.
Topical Sulfur Applications Pregnancy And Lactation Text: This medication is Pregnancy Category C and has an unknown safety profile during pregnancy. It is unknown if this topical medication is excreted in breast milk.
Azithromycin Counseling:  I discussed with the patient the risks of azithromycin including but not limited to GI upset, allergic reaction, drug rash, diarrhea, and yeast infections.
Minocycline Counseling: Patient advised regarding possible photosensitivity and discoloration of the teeth, skin, lips, tongue and gums.  Patient instructed to avoid sunlight, if possible.  When exposed to sunlight, patients should wear protective clothing, sunglasses, and sunscreen.  The patient was instructed to call the office immediately if the following severe adverse effects occur:  hearing changes, easy bruising/bleeding, severe headache, or vision changes.  The patient verbalized understanding of the proper use and possible adverse effects of minocycline.  All of the patient's questions and concerns were addressed.
Bactrim Counseling:  I discussed with the patient the risks of sulfa antibiotics including but not limited to GI upset, allergic reaction, drug rash, diarrhea, dizziness, photosensitivity, and yeast infections.  Rarely, more serious reactions can occur including but not limited to aplastic anemia, agranulocytosis, methemoglobinemia, blood dyscrasias, liver or kidney failure, lung infiltrates or desquamative/blistering drug rashes.
Doxycycline Pregnancy And Lactation Text: This medication is Pregnancy Category D and not consider safe during pregnancy. It is also excreted in breast milk but is considered safe for shorter treatment courses.
Bactrim Pregnancy And Lactation Text: This medication is Pregnancy Category D and is known to cause fetal risk.  It is also excreted in breast milk.
Tazorac Pregnancy And Lactation Text: This medication is not safe during pregnancy. It is unknown if this medication is excreted in breast milk.
Erythromycin Counseling:  I discussed with the patient the risks of erythromycin including but not limited to GI upset, allergic reaction, drug rash, diarrhea, increase in liver enzymes, and yeast infections.
Azelaic Acid Counseling: Patient counseled that medicine may cause skin irritation and to avoid applying near the eyes.  In the event of skin irritation, the patient was advised to reduce the amount of the drug applied or use it less frequently.   The patient verbalized understanding of the proper use and possible adverse effects of azelaic acid.  All of the patient's questions and concerns were addressed.
Isotretinoin Counseling: Patient should get monthly blood tests, not donate blood, not drive at night if vision affected, not share medication, and not undergo elective surgery for 6 months after tx completed. Side effects reviewed, pt to contact office should one occur.
Birth Control Pills Pregnancy And Lactation Text: This medication should be avoided if pregnant and for the first 30 days post-partum.
Benzoyl Peroxide Counseling: Patient counseled that medicine may cause skin irritation and bleach clothing.  In the event of skin irritation, the patient was advised to reduce the amount of the drug applied or use it less frequently.   The patient verbalized understanding of the proper use and possible adverse effects of benzoyl peroxide.  All of the patient's questions and concerns were addressed.
Topical Clindamycin Pregnancy And Lactation Text: This medication is Pregnancy Category B and is considered safe during pregnancy. It is unknown if it is excreted in breast milk.

## 2024-12-12 NOTE — PROCEDURE: MEDICATION COUNSELING
Skyrizi Counseling: I discussed with the patient the risks of risankizumab-rzaa including but not limited to immunosuppression, and serious infections.  The patient understands that monitoring is required including a PPD at baseline and must alert us or the primary physician if symptoms of infection or other concerning signs are noted.
Prednisone Counseling:  I discussed with the patient the risks of prolonged use of prednisone including but not limited to weight gain, insomnia, osteoporosis, mood changes, diabetes, susceptibility to infection, glaucoma and high blood pressure.  In cases where prednisone use is prolonged, patients should be monitored with blood pressure checks, serum glucose levels and an eye exam.  Additionally, the patient may need to be placed on GI prophylaxis, PCP prophylaxis, and calcium and vitamin D supplementation and/or a bisphosphonate.  The patient verbalized understanding of the proper use and the possible adverse effects of prednisone.  All of the patient's questions and concerns were addressed.
Semaglutide Counseling: I reviewed the possible side effects including: thyroid tumors, kidney disease, gallbladder disease, abdominal pain, constipation, diarrhea, nausea, vomiting and pancreatitis. Do not take this medication if you have a history or family history of multiple endocrine neoplasia syndrome type 2. Side effects reviewed, pt to contact office should one occur.
Topical Sulfur Applications Counseling: Topical Sulfur Counseling: Patient counseled that this medication may cause skin irritation or allergic reactions.  In the event of skin irritation, the patient was advised to reduce the amount of the drug applied or use it less frequently.   The patient verbalized understanding of the proper use and possible adverse effects of topical sulfur application.  All of the patient's questions and concerns were addressed.
Fluconazole Counseling:  Patient counseled regarding adverse effects of fluconazole including but not limited to headache, diarrhea, nausea, upset stomach, liver function test abnormalities, taste disturbance, and stomach pain.  There is a rare possibility of liver failure that can occur when taking fluconazole.  The patient understands that monitoring of LFTs and kidney function test may be required, especially at baseline. The patient verbalized understanding of the proper use and possible adverse effects of fluconazole.  All of the patient's questions and concerns were addressed.
Eucrisa Pregnancy And Lactation Text: This medication has not been assigned a Pregnancy Risk Category but animal studies failed to show danger with the topical medication. It is unknown if the medication is excreted in breast milk.
Low Dose Naltrexone Pregnancy And Lactation Text: Naltrexone is pregnancy category C.  There have been no adequate and well-controlled studies in pregnant women.  It should be used in pregnancy only if the potential benefit justifies the potential risk to the fetus.   Limited data indicates that naltrexone is minimally excreted into breastmilk.
Bexarotene Pregnancy And Lactation Text: This medication is Pregnancy Category X and should not be given to women who are pregnant or may become pregnant. This medication should not be used if you are breast feeding.
5-Fu Counseling: 5-Fluorouracil Counseling:  I discussed with the patient the risks of 5-fluorouracil including but not limited to erythema, scaling, itching, weeping, crusting, and pain.
5-Fu Pregnancy And Lactation Text: This medication is Pregnancy Category X and contraindicated in pregnancy and in women who may become pregnant. It is unknown if this medication is excreted in breast milk.
Cimetidine Counseling:  I discussed with the patient the risks of Cimetidine including but not limited to gynecomastia, headache, diarrhea, nausea, drowsiness, arrhythmias, pancreatitis, skin rashes, psychosis, bone marrow suppression and kidney toxicity.
Isotretinoin Counseling: Patient should get monthly blood tests, not donate blood, not drive at night if vision affected, not share medication, and not undergo elective surgery for 6 months after tx completed. Side effects reviewed, pt to contact office should one occur.
Fluconazole Pregnancy And Lactation Text: This medication is Pregnancy Category C and it isn't know if it is safe during pregnancy. It is also excreted in breast milk.
Humira Counseling:  I discussed with the patient the risks of adalimumab including but not limited to myelosuppression, immunosuppression, autoimmune hepatitis, demyelinating diseases, lymphoma, and serious infections.  The patient understands that monitoring is required including a PPD at baseline and must alert us or the primary physician if symptoms of infection or other concerning signs are noted.
Dutasteride Male Counseling: Dustasteride Counseling:  I discussed with the patient the risks of use of dutasteride including but not limited to decreased libido, decreased ejaculate volume, and gynecomastia. Women who can become pregnant should not handle medication.  All of the patient's questions and concerns were addressed.
Rhofade Counseling: Rhofade is a topical medication which can decrease superficial blood flow where applied. Side effects are uncommon and include stinging, redness and allergic reactions.
Colchicine Counseling:  Patient counseled regarding adverse effects including but not limited to stomach upset (nausea, vomiting, stomach pain, or diarrhea).  Patient instructed to limit alcohol consumption while taking this medication.  Colchicine may reduce blood counts especially with prolonged use.  The patient understands that monitoring of kidney function and blood counts may be required, especially at baseline. The patient verbalized understanding of the proper use and possible adverse effects of colchicine.  All of the patient's questions and concerns were addressed.
Tranexamic Acid Counseling:  Patient advised of the small risk of bleeding problems with tranexamic acid. They were also instructed to call if they developed any nausea, vomiting or diarrhea. All of the patient's questions and concerns were addressed.
Rinvoq Pregnancy And Lactation Text: Based on animal studies, Rinvoq may cause embryo-fetal harm when administered to pregnant women.  The medication should not be used in pregnancy.  Breastfeeding is not recommended during treatment and for 6 days after the last dose.
Quinolones Counseling:  I discussed with the patient the risks of fluoroquinolones including but not limited to GI upset, allergic reaction, drug rash, diarrhea, dizziness, photosensitivity, yeast infections, liver function test abnormalities, tendonitis/tendon rupture.
Prednisone Pregnancy And Lactation Text: This medication is Pregnancy Category C and it isn't know if it is safe during pregnancy. This medication is excreted in breast milk.
Semaglutide Pregnancy And Lactation Text: The fetal risk of this medication is unknown and taking while pregnant is not recommended. It is unknown if this medication is present in breast milk.
Niacinamide Counseling: I recommended taking niacin or niacinamide, also know as vitamin B3, twice daily. Recent evidence suggests that taking vitamin B3 (500 mg twice daily) can reduce the risk of actinic keratoses and non-melanoma skin cancers. Side effects of vitamin B3 include flushing and headache.
Skyrizi Pregnancy And Lactation Text: The risk during pregnancy and breastfeeding is uncertain with this medication.
Hydroquinone Counseling:  Patient advised that medication may result in skin irritation, lightening (hypopigmentation), dryness, and burning.  In the event of skin irritation, the patient was advised to reduce the amount of the drug applied or use it less frequently.  Rarely, spots that are treated with hydroquinone can become darker (pseudoochronosis).  Should this occur, patient instructed to stop medication and call the office. The patient verbalized understanding of the proper use and possible adverse effects of hydroquinone.  All of the patient's questions and concerns were addressed.
Topical Sulfur Applications Pregnancy And Lactation Text: This medication is Pregnancy Category C and has an unknown safety profile during pregnancy. It is unknown if this topical medication is excreted in breast milk.
Azithromycin Counseling:  I discussed with the patient the risks of azithromycin including but not limited to GI upset, allergic reaction, drug rash, diarrhea, and yeast infections.
Niacinamide Pregnancy And Lactation Text: These medications are considered safe during pregnancy.
Isotretinoin Pregnancy And Lactation Text: This medication is Pregnancy Category X and is considered extremely dangerous during pregnancy. It is unknown if it is excreted in breast milk.
Cimetidine Pregnancy And Lactation Text: This medication is Pregnancy Category B and is considered safe during pregnancy. It is also excreted in breast milk and breast feeding isn't recommended.
Drysol Counseling:  I discussed with the patient the risks of drysol/aluminum chloride including but not limited to skin rash, itching, irritation, burning.
Wartpeel Counseling:  I discussed with the patient the risks of Wartpeel including but not limited to erythema, scaling, itching, weeping, crusting, and pain.
Sotyktu Counseling:  I discussed the most common side effects of Sotyktu including: common cold, sore throat, sinus infections, cold sores, canker sores, folliculitis, and acne.? I also discussed more serious side effects of Sotyktu including but not limited to: serious allergic reactions; increased risk for infections such as TB; cancers such as lymphomas; rhabdomyolysis and elevated CPK; and elevated triglycerides and liver enzymes.?
Tranexamic Acid Pregnancy And Lactation Text: It is unknown if this medication is safe during pregnancy or breast feeding.
Dutasteride Female Counseling: Dutasteride Counseling:  I discussed with the patient the risks of use of dutasteride including but not limited to decreased libido and sexual dysfunction. Explained the teratogenic nature of the medication and stressed the importance of not getting pregnant during treatment. All of the patient's questions and concerns were addressed.
Rhofade Pregnancy And Lactation Text: This medication has not been assigned a Pregnancy Risk Category. It is unknown if the medication is excreted in breast milk.
Griseofulvin Counseling:  I discussed with the patient the risks of griseofulvin including but not limited to photosensitivity, cytopenia, liver damage, nausea/vomiting and severe allergy.  The patient understands that this medication is best absorbed when taken with a fatty meal (e.g., ice cream or french fries).
Colchicine Pregnancy And Lactation Text: This medication is Pregnancy Category C and isn't considered safe during pregnancy. It is excreted in breast milk.
Humira Pregnancy And Lactation Text: This medication is Pregnancy Category B and is considered safe during pregnancy. It is unknown if this medication is excreted in breast milk.
Valtrex Counseling: I discussed with the patient the risks of valacyclovir including but not limited to kidney damage, nausea, vomiting and severe allergy.  The patient understands that if the infection seems to be worsening or is not improving, they are to call.
Rifampin Counseling: I discussed with the patient the risks of rifampin including but not limited to liver damage, kidney damage, red-orange body fluids, nausea/vomiting and severe allergy.
Dapsone Counseling: I discussed with the patient the risks of dapsone including but not limited to hemolytic anemia, agranulocytosis, rashes, methemoglobinemia, kidney failure, peripheral neuropathy, headaches, GI upset, and liver toxicity.  Patients who start dapsone require monitoring including baseline LFTs and weekly CBCs for the first month, then every month thereafter.  The patient verbalized understanding of the proper use and possible adverse effects of dapsone.  All of the patient's questions and concerns were addressed.
Solaraze Counseling:  I discussed with the patient the risks of Solaraze including but not limited to erythema, scaling, itching, weeping, crusting, and pain.
Wegovy Counseling: I reviewed the possible side effects including: thyroid tumors, kidney disease, gallbladder disease, abdominal pain, constipation, diarrhea, nausea, vomiting and pancreatitis. Do not take this medication if you have a history or family history of multiple endocrine neoplasia syndrome type 2. Side effects reviewed, pt to contact office should one occur.
Spevigo Counseling: I discussed with the patient the risks of Spevigo including but not limited to fatigue, nasuea, vomiting, headache, pruritus, urinary tract infection, an infusion related reactions.  The patient understands that monitoring is required including screening for tuberculosis at baseline and yearly screening thereafter while continuing Spevigo therapy. They should contact us if symptoms of infection or other concerning signs are noted.
Opioid Counseling: I discussed with the patient the potential side effects of opioids including but not limited to addiction, altered mental status, and depression. I stressed avoiding alcohol, benzodiazepines, muscle relaxants and sleep aids unless specifically okayed by a physician. The patient verbalized understanding of the proper use and possible adverse effects of opioids. All of the patient's questions and concerns were addressed. They were instructed to flush the remaining pills down the toilet if they did not need them for pain.
Spevigo Pregnancy And Lactation Text: The risk during pregnancy and breastfeeding is uncertain with this medication. This medication does cross the placenta. It is unknown if this medication is found in breast milk.
Doxepin Counseling:  Patient advised that the medication is sedating and not to drive a car after taking this medication. Patient informed of potential adverse effects including but not limited to dry mouth, urinary retention, and blurry vision.  The patient verbalized understanding of the proper use and possible adverse effects of doxepin.  All of the patient's questions and concerns were addressed.
Imiquimod Counseling:  I discussed with the patient the risks of imiquimod including but not limited to erythema, scaling, itching, weeping, crusting, and pain.  Patient understands that the inflammatory response to imiquimod is variable from person to person and was educated regarded proper titration schedule.  If flu-like symptoms develop, patient knows to discontinue the medication and contact us.
Sotyktu Pregnancy And Lactation Text: There is insufficient data to evaluate whether or not Sotyktu is safe to use during pregnancy.? ?It is not known if Sotyktu passes into breast milk and whether or not it is safe to use when breastfeeding.??
Griseofulvin Pregnancy And Lactation Text: This medication is Pregnancy Category X and is known to cause serious birth defects. It is unknown if this medication is excreted in breast milk but breast feeding should be avoided.
Azithromycin Pregnancy And Lactation Text: This medication is considered safe during pregnancy and is also secreted in breast milk.
Hyrimoz Counseling:  I discussed with the patient the risks of adalimumab including but not limited to myelosuppression, immunosuppression, autoimmune hepatitis, demyelinating diseases, lymphoma, and serious infections.  The patient understands that monitoring is required including a PPD at baseline and must alert us or the primary physician if symptoms of infection or other concerning signs are noted.
Drysol Pregnancy And Lactation Text: This medication is considered safe during pregnancy and breast feeding.
Nsaids Counseling: NSAID Counseling: I discussed with the patient that NSAIDs should be taken with food. Prolonged use of NSAIDs can result in the development of stomach ulcers.  Patient advised to stop taking NSAIDs if abdominal pain occurs.  The patient verbalized understanding of the proper use and possible adverse effects of NSAIDs.  All of the patient's questions and concerns were addressed.
High Dose Vitamin A Counseling: Side effects reviewed, pt to contact office should one occur.
Dutasteride Pregnancy And Lactation Text: This medication is absolutely contraindicated in women, especially during pregnancy and breast feeding. Feminization of male fetuses is possible if taking while pregnant.
Finasteride Male Counseling: Finasteride Counseling:  I discussed with the patient the risks of use of finasteride including but not limited to decreased libido, decreased ejaculate volume, gynecomastia, and depression. Women should not handle medication.  All of the patient's questions and concerns were addressed.
Bactrim Counseling:  I discussed with the patient the risks of sulfa antibiotics including but not limited to GI upset, allergic reaction, drug rash, diarrhea, dizziness, photosensitivity, and yeast infections.  Rarely, more serious reactions can occur including but not limited to aplastic anemia, agranulocytosis, methemoglobinemia, blood dyscrasias, liver or kidney failure, lung infiltrates or desquamative/blistering drug rashes.
Xeljanz Counseling: I discussed with the patient the risks of Xeljanz therapy including increased risk of infection, liver issues, headache, diarrhea, or cold symptoms. Live vaccines should be avoided. They were instructed to call if they have any problems.
Itraconazole Counseling:  I discussed with the patient the risks of itraconazole including but not limited to liver damage, nausea/vomiting, neuropathy, and severe allergy.  The patient understands that this medication is best absorbed when taken with acidic beverages such as non-diet cola or ginger ale.  The patient understands that monitoring is required including baseline LFTs and repeat LFTs at intervals.  The patient understands that they are to contact us or the primary physician if concerning signs are noted.
Dapsone Pregnancy And Lactation Text: This medication is Pregnancy Category C and is not considered safe during pregnancy or breast feeding.
Solaraze Pregnancy And Lactation Text: This medication is Pregnancy Category B and is considered safe. There is some data to suggest avoiding during the third trimester. It is unknown if this medication is excreted in breast milk.
Erivedge Counseling- I discussed with the patient the risks of Erivedge including but not limited to nausea, vomiting, diarrhea, constipation, weight loss, changes in the sense of taste, decreased appetite, muscle spasms, and hair loss.  The patient verbalized understanding of the proper use and possible adverse effects of Erivedge.  All of the patient's questions and concerns were addressed.
Valtrex Pregnancy And Lactation Text: this medication is Pregnancy Category B and is considered safe during pregnancy. This medication is not directly found in breast milk but it's metabolite acyclovir is present.
Rifampin Pregnancy And Lactation Text: This medication is Pregnancy Category C and it isn't know if it is safe during pregnancy. It is also excreted in breast milk and should not be used if you are breast feeding.
Erivedge Pregnancy And Lactation Text: This medication is Pregnancy Category X and is absolutely contraindicated during pregnancy. It is unknown if it is excreted in breast milk.
Opioid Pregnancy And Lactation Text: These medications can lead to premature delivery and should be avoided during pregnancy. These medications are also present in breast milk in small amounts.
Zepbound Counseling: I reviewed the possible side effects including: thyroid tumors, kidney disease, gallbladder disease, abdominal pain, constipation, diarrhea, nausea, vomiting and pancreatitis. Do not take this medication if you have a history or family history of multiple endocrine neoplasia syndrome type 2. Side effects reviewed, pt to contact office should one occur.
Doxepin Pregnancy And Lactation Text: This medication is Pregnancy Category C and it isn't known if it is safe during pregnancy. It is also excreted in breast milk and breast feeding isn't recommended.
Imiquimod Pregnancy And Lactation Text: This medication is Pregnancy Category C. It is unknown if this medication is excreted in breast milk.
Nsaids Pregnancy And Lactation Text: These medications are considered safe up to 30 weeks gestation. It is excreted in breast milk.
Winlevi Counseling:  I discussed with the patient the risks of topical clascoterone including but not limited to erythema, scaling, itching, and stinging. Patient voiced their understanding.
Stelara Counseling:  I discussed with the patient the risks of ustekinumab including but not limited to immunosuppression, malignancy, posterior leukoencephalopathy syndrome, and serious infections.  The patient understands that monitoring is required including a PPD at baseline and must alert us or the primary physician if symptoms of infection or other concerning signs are noted.
High Dose Vitamin A Pregnancy And Lactation Text: High dose vitamin A therapy is contraindicated during pregnancy and breast feeding.
Elidel Counseling: Patient may experience a mild burning sensation during topical application. Elidel is not approved in children less than 2 years of age. There have been case reports of hematologic and skin malignancies in patients using topical calcineurin inhibitors although causality is questionable.
Dupixent Pregnancy And Lactation Text: This medication likely crosses the placenta but the risk for the fetus is uncertain. This medication is excreted in breast milk.
Erythromycin Pregnancy And Lactation Text: This medication is Pregnancy Category B and is considered safe during pregnancy. It is also excreted in breast milk.
Litfulo Counseling: I discussed with the patient the risks of Litfulo therapy including but not limited to upper respiratory tract infections, shingles, cold sores, and nausea. Live vaccines should be avoided.  This medication has been linked to serious infections; higher rate of mortality; malignancy and lymphoproliferative disorders; major adverse cardiovascular events; thrombosis; gastrointestinal perforations; neutropenia; lymphopenia; anemia; liver enzyme elevations; and lipid elevations.
Cyclophosphamide Pregnancy And Lactation Text: This medication is Pregnancy Category D and it isn't considered safe during pregnancy. This medication is excreted in breast milk.
Propranolol Pregnancy And Lactation Text: This medication is Pregnancy Category C and it isn't known if it is safe during pregnancy. It is excreted in breast milk.
Simlandi Counseling:  I discussed with the patient the risks of adalimumab including but not limited to myelosuppression, immunosuppression, autoimmune hepatitis, demyelinating diseases, lymphoma, and serious infections.  The patient understands that monitoring is required including a PPD at baseline and must alert us or the primary physician if symptoms of infection or other concerning signs are noted.
Cyclosporine Counseling:  I discussed with the patient the risks of cyclosporine including but not limited to hypertension, gingival hyperplasia,myelosuppression, immunosuppression, liver damage, kidney damage, neurotoxicity, lymphoma, and serious infections. The patient understands that monitoring is required including baseline blood pressure, CBC, CMP, lipid panel and uric acid, and then 1-2 times monthly CMP and blood pressure.
Ozempic Counseling: I reviewed the possible side effects including: thyroid tumors, kidney disease, gallbladder disease, abdominal pain, constipation, diarrhea, nausea, vomiting and pancreatitis. Do not take this medication if you have a history or family history of multiple endocrine neoplasia syndrome type 2. Side effects reviewed, pt to contact office should one occur.
Topical Metronidazole Counseling: Metronidazole is a topical antibiotic medication. You may experience burning, stinging, redness, or allergic reactions.  Please call our office if you develop any problems from using this medication.
Glycopyrrolate Pregnancy And Lactation Text: This medication is Pregnancy Category B and is considered safe during pregnancy. It is unknown if it is excreted breast milk.
Calcipotriene Counseling:  I discussed with the patient the risks of calcipotriene including but not limited to erythema, scaling, itching, and irritation.
Acitretin Counseling:  I discussed with the patient the risks of acitretin including but not limited to hair loss, dry lips/skin/eyes, liver damage, hyperlipidemia, depression/suicidal ideation, photosensitivity.  Serious rare side effects can include but are not limited to pancreatitis, pseudotumor cerebri, bony changes, clot formation/stroke/heart attack.  Patient understands that alcohol is contraindicated since it can result in liver toxicity and significantly prolong the elimination of the drug by many years.
Protopic Counseling: Patient may experience a mild burning sensation during topical application. Protopic is not approved in children less than 2 years of age. There have been case reports of hematologic and skin malignancies in patients using topical calcineurin inhibitors although causality is questionable.
Ebglyss Counseling: I discussed with the patient the risks of lebrikizumab including but not limited to eye inflammation and irritation, cold sores, injection site reactions, allergic reactions and increased risk of parasitic infection. The patient understands that monitoring is required and they must alert us or the primary physician if symptoms of infection or other concerning signs are noted.
Arava Counseling:  Patient counseled regarding adverse effects of Arava including but not limited to nausea, vomiting, abnormalities in liver function tests. Patients may develop mouth sores, rash, diarrhea, and abnormalities in blood counts. The patient understands that monitoring is required including LFTs and blood counts.  There is a rare possibility of scarring of the liver and lung problems that can occur when taking methotrexate. Persistent nausea, loss of appetite, pale stools, dark urine, cough, and shortness of breath should be reported immediately. Patient advised to discontinue Arava treatment and consult with a physician prior to attempting conception. The patient will have to undergo a treatment to eliminate Arava from the body prior to conception.
Litfulo Pregnancy And Lactation Text: Based on animal studies, Lifulo may cause embryo-fetal harm when administered to pregnant women.  The medication should not be used in pregnancy.  Breastfeeding is not recommended during treatment.
SSKI Counseling:  I discussed with the patient the risks of SSKI including but not limited to thyroid abnormalities, metallic taste, GI upset, fever, headache, acne, arthralgias, paraesthesias, lymphadenopathy, easy bleeding, arrhythmias, and allergic reaction.
Metronidazole Counseling:  I discussed with the patient the risks of metronidazole including but not limited to seizures, nausea/vomiting, a metallic taste in the mouth, nausea/vomiting and severe allergy.
Metronidazole Pregnancy And Lactation Text: This medication is Pregnancy Category B and considered safe during pregnancy.  It is also excreted in breast milk.
Hydroxychloroquine Counseling:  I discussed with the patient that a baseline ophthalmologic exam is needed at the start of therapy and every year thereafter while on therapy. A CBC may also be warranted for monitoring.  The side effects of this medication were discussed with the patient, including but not limited to agranulocytosis, aplastic anemia, seizures, rashes, retinopathy, and liver toxicity. Patient instructed to call the office should any adverse effect occur.  The patient verbalized understanding of the proper use and possible adverse effects of Plaquenil.  All the patient's questions and concerns were addressed.
Topical Metronidazole Pregnancy And Lactation Text: This medication is Pregnancy Category B and considered safe during pregnancy.  It is also considered safe to use while breastfeeding.
Spironolactone Counseling: Patient advised regarding risks of diarrhea, abdominal pain, hyperkalemia, birth defects (for female patients), liver toxicity and renal toxicity. The patient may need blood work to monitor liver and kidney function and potassium levels while on therapy. The patient verbalized understanding of the proper use and possible adverse effects of spironolactone.  All of the patient's questions and concerns were addressed.
Detail Level: Simple
Acitretin Pregnancy And Lactation Text: This medication is Pregnancy Category X and should not be given to women who are pregnant or may become pregnant in the future. This medication is excreted in breast milk.
Hydroxychloroquine Pregnancy And Lactation Text: This medication has been shown to cause fetal harm but it isn't assigned a Pregnancy Risk Category. There are small amounts excreted in breast milk.
Calcipotriene Pregnancy And Lactation Text: The use of this medication during pregnancy or lactation is not recommended as there is insufficient data.
Sski Pregnancy And Lactation Text: This medication is Pregnancy Category D and isn't considered safe during pregnancy. It is excreted in breast milk.
Olumiant Counseling: I discussed with the patient the risks of Olumiant therapy including but not limited to upper respiratory tract infections, shingles, cold sores, and nausea. Live vaccines should be avoided.  This medication has been linked to serious infections; higher rate of mortality; malignancy and lymphoproliferative disorders; major adverse cardiovascular events; thrombosis; gastrointestinal perforations; neutropenia; lymphopenia; anemia; liver enzyme elevations; and lipid elevations.
Ebglyss Pregnancy And Lactation Text: This medication likely crosses the placenta but the risk for the fetus is uncertain. It is unknown if this medication is excreted in breast milk.
Protopic Pregnancy And Lactation Text: This medication is Pregnancy Category C. It is unknown if this medication is excreted in breast milk when applied topically.
Methotrexate Counseling:  Patient counseled regarding adverse effects of methotrexate including but not limited to nausea, vomiting, abnormalities in liver function tests. Patients may develop mouth sores, rash, diarrhea, and abnormalities in blood counts. The patient understands that monitoring is required including LFT's and blood counts.  There is a rare possibility of scarring of the liver and lung problems that can occur when taking methotrexate. Persistent nausea, loss of appetite, pale stools, dark urine, cough, and shortness of breath should be reported immediately. Patient advised to discontinue methotrexate treatment at least three months before attempting to become pregnant.  I discussed the need for folate supplements while taking methotrexate.  These supplements can decrease side effects during methotrexate treatment. The patient verbalized understanding of the proper use and possible adverse effects of methotrexate.  All of the patient's questions and concerns were addressed.
Thalidomide Counseling: I discussed with the patient the risks of thalidomide including but not limited to birth defects, anxiety, weakness, chest pain, dizziness, cough and severe allergy.
Minocycline Counseling: Patient advised regarding possible photosensitivity and discoloration of the teeth, skin, lips, tongue and gums.  Patient instructed to avoid sunlight, if possible.  When exposed to sunlight, patients should wear protective clothing, sunglasses, and sunscreen.  The patient was instructed to call the office immediately if the following severe adverse effects occur:  hearing changes, easy bruising/bleeding, severe headache, or vision changes.  The patient verbalized understanding of the proper use and possible adverse effects of minocycline.  All of the patient's questions and concerns were addressed.
Qbrexza Counseling:  I discussed with the patient the risks of Qbrexza including but not limited to headache, mydriasis, blurred vision, dry eyes, nasal dryness, dry mouth, dry throat, dry skin, urinary hesitation, and constipation.  Local skin reactions including erythema, burning, stinging, and itching can also occur.
Clofazimine Counseling:  I discussed with the patient the risks of clofazimine including but not limited to skin and eye pigmentation, liver damage, nausea/vomiting, gastrointestinal bleeding and allergy.
Spironolactone Pregnancy And Lactation Text: This medication can cause feminization of the male fetus and should be avoided during pregnancy. The active metabolite is also found in breast milk.
Saxenda Counseling: I reviewed the possible side effects including: thyroid tumors, kidney disease, gallbladder disease, abdominal pain, constipation, diarrhea, nausea, vomiting and pancreatitis. Do not take this medication if you have a history or family history of multiple endocrine neoplasia syndrome type 2. Side effects reviewed, pt to contact office should one occur.
Simponi Counseling:  I discussed with the patient the risks of golimumab including but not limited to myelosuppression, immunosuppression, autoimmune hepatitis, demyelinating diseases, lymphoma, and serious infections.  The patient understands that monitoring is required including a PPD at baseline and must alert us or the primary physician if symptoms of infection or other concerning signs are noted.
Topical Steroids Counseling: I discussed with the patient that prolonged use of topical steroids can result in the increased appearance of superficial blood vessels (telangiectasias), lightening (hypopigmentation) and thinning of the skin (atrophy).  Patient understands to avoid using high potency steroids in skin folds, the groin or the face.  The patient verbalized understanding of the proper use and possible adverse effects of topical steroids.  All of the patient's questions and concerns were addressed.
Low Dose Naltrexone Counseling- I discussed with the patient the potential risks and side effects of low dose naltrexone including but not limited to: more vivid dreams, headaches, nausea, vomiting, abdominal pain, fatigue, dizziness, and anxiety.
Bexarotene Counseling:  I discussed with the patient the risks of bexarotene including but not limited to hair loss, dry lips/skin/eyes, liver abnormalities, hyperlipidemia, pancreatitis, depression/suicidal ideation, photosensitivity, drug rash/allergic reactions, hypothyroidism, anemia, leukopenia, infection, cataracts, and teratogenicity.  Patient understands that they will need regular blood tests to check lipid profile, liver function tests, white blood cell count, thyroid function tests and pregnancy test if applicable.
Topical Steroids Applications Pregnancy And Lactation Text: Most topical steroids are considered safe to use during pregnancy and lactation.  Any topical steroid applied to the breast or nipple should be washed off before breastfeeding.
Eucrisa Counseling: Patient may experience a mild burning sensation during topical application. Eucrisa is not approved in children less than 2 years of age.
Olumiant Pregnancy And Lactation Text: Based on animal studies, Olumiant may cause embryo-fetal harm when administered to pregnant women.  The medication should not be used in pregnancy.  Breastfeeding is not recommended during treatment.
Enbrel Counseling:  I discussed with the patient the risks of etanercept including but not limited to myelosuppression, immunosuppression, autoimmune hepatitis, demyelinating diseases, lymphoma, and infections.  The patient understands that monitoring is required including a PPD at baseline and must alert us or the primary physician if symptoms of infection or other concerning signs are noted.
Cantharidin Counseling:  I discussed with the patient the risks of Cantharidin including but not limited to pain, redness, burning, itching, and blistering.
Include Pregnancy/Lactation Warning?: No
Minocycline Pregnancy And Lactation Text: This medication is Pregnancy Category D and not consider safe during pregnancy. It is also excreted in breast milk.
Rinvoq Counseling: I discussed with the patient the risks of Rinvoq therapy including but not limited to upper respiratory tract infections, shingles, cold sores, bronchitis, nausea, cough, fever, acne, and headache. Live vaccines should be avoided.  This medication has been linked to serious infections; higher rate of mortality; malignancy and lymphoproliferative disorders; major adverse cardiovascular events; thrombosis; thrombocytopenia, anemia, and neutropenia; lipid elevations; liver enzyme elevations; and gastrointestinal perforations.
Qbrexza Pregnancy And Lactation Text: There is no available data on Qbrexza use in pregnant women.  There is no available data on Qbrexza use in lactation.
Methotrexate Pregnancy And Lactation Text: This medication is Pregnancy Category X and is known to cause fetal harm. This medication is excreted in breast milk.
Azelaic Acid Counseling: Patient counseled that medicine may cause skin irritation and to avoid applying near the eyes.  In the event of skin irritation, the patient was advised to reduce the amount of the drug applied or use it less frequently.   The patient verbalized understanding of the proper use and possible adverse effects of azelaic acid.  All of the patient's questions and concerns were addressed.
Tazorac Pregnancy And Lactation Text: This medication is not safe during pregnancy. It is unknown if this medication is excreted in breast milk.
Mirvaso Counseling: Mirvaso is a topical medication which can decrease superficial blood flow where applied. Side effects are uncommon and include stinging, redness and allergic reactions.
Cimzia Counseling:  I discussed with the patient the risks of Cimzia including but not limited to immunosuppression, allergic reactions and infections.  The patient understands that monitoring is required including a PPD at baseline and must alert us or the primary physician if symptoms of infection or other concerning signs are noted.
Zoryve Pregnancy And Lactation Text: It is unknown if this medication can cause problems during pregnancy and breastfeeding.
Otezla Pregnancy And Lactation Text: This medication is Pregnancy Category C and it isn't known if it is safe during pregnancy. It is unknown if it is excreted in breast milk.
Xolair Counseling:  Patient informed of potential adverse effects including but not limited to fever, muscle aches, rash and allergic reactions.  The patient verbalized understanding of the proper use and possible adverse effects of Xolair.  All of the patient's questions and concerns were addressed.
Albendazole Counseling:  I discussed with the patient the risks of albendazole including but not limited to cytopenia, kidney damage, nausea/vomiting and severe allergy.  The patient understands that this medication is being used in an off-label manner.
Zyclara Counseling:  I discussed with the patient the risks of imiquimod including but not limited to erythema, scaling, itching, weeping, crusting, and pain.  Patient understands that the inflammatory response to imiquimod is variable from person to person and was educated regarded proper titration schedule.  If flu-like symptoms develop, patient knows to discontinue the medication and contact us.
Nemluvio Pregnancy And Lactation Text: It is not known if Nemluvio causes fetal harm or is present in breast milk. Please proceed with caution if patients who are pregnant or breastfeeding.
Azathioprine Pregnancy And Lactation Text: This medication is Pregnancy Category D and isn't considered safe during pregnancy. It is unknown if this medication is excreted in breast milk.
Doxycycline Counseling:  Patient counseled regarding possible photosensitivity and increased risk for sunburn.  Patient instructed to avoid sunlight, if possible.  When exposed to sunlight, patients should wear protective clothing, sunglasses, and sunscreen.  The patient was instructed to call the office immediately if the following severe adverse effects occur:  hearing changes, easy bruising/bleeding, severe headache, or vision changes.  The patient verbalized understanding of the proper use and possible adverse effects of doxycycline.  All of the patient's questions and concerns were addressed.
Rituxan Counseling:  I discussed with the patient the risks of Rituxan infusions. Side effects can include infusion reactions, severe drug rashes including mucocutaneous reactions, reactivation of latent hepatitis and other infections and rarely progressive multifocal leukoencephalopathy.  All of the patient's questions and concerns were addressed.
Topical Clindamycin Counseling: Patient counseled that this medication may cause skin irritation or allergic reactions.  In the event of skin irritation, the patient was advised to reduce the amount of the drug applied or use it less frequently.   The patient verbalized understanding of the proper use and possible adverse effects of clindamycin.  All of the patient's questions and concerns were addressed.
Cimzia Pregnancy And Lactation Text: This medication crosses the placenta but can be considered safe in certain situations. Cimzia may be excreted in breast milk.
Cosentyx Counseling:  I discussed with the patient the risks of Cosentyx including but not limited to worsening of Crohn's disease, immunosuppression, allergic reactions and infections.  The patient understands that monitoring is required including a PPD at baseline and must alert us or the primary physician if symptoms of infection or other concerning signs are noted.
Xolair Pregnancy And Lactation Text: This medication is Pregnancy Category B and is considered safe during pregnancy. This medication is excreted in breast milk.
Benzoyl Peroxide Counseling: Patient counseled that medicine may cause skin irritation and bleach clothing.  In the event of skin irritation, the patient was advised to reduce the amount of the drug applied or use it less frequently.   The patient verbalized understanding of the proper use and possible adverse effects of benzoyl peroxide.  All of the patient's questions and concerns were addressed.
Albendazole Pregnancy And Lactation Text: This medication is Pregnancy Category C and it isn't known if it is safe during pregnancy. It is also excreted in breast milk.
Opzelura Counseling:  I discussed with the patient the risks of Opzelura including but not limited to nasopharngitis, bronchitis, ear infection, eosinophila, hives, diarrhea, folliculitis, tonsillitis, and rhinorrhea.  Taken orally, this medication has been linked to serious infections; higher rate of mortality; malignancy and lymphoproliferative disorders; major adverse cardiovascular events; thrombosis; thrombocytopenia, anemia, and neutropenia; and lipid elevations.
Oxybutynin Counseling:  I discussed with the patient the risks of oxybutynin including but not limited to skin rash, drowsiness, dry mouth, difficulty urinating, and blurred vision.
Cellcept Counseling:  I discussed with the patient the risks of mycophenolate mofetil including but not limited to infection/immunosuppression, GI upset, hypokalemia, hypercholesterolemia, bone marrow suppression, lymphoproliferative disorders, malignancy, GI ulceration/bleed/perforation, colitis, interstitial lung disease, kidney failure, progressive multifocal leukoencephalopathy, and birth defects.  The patient understands that monitoring is required including a baseline creatinine and regular CBC testing. In addition, patient must alert us immediately if symptoms of infection or other concerning signs are noted.
Ivermectin Counseling:  Patient instructed to take medication on an empty stomach with a full glass of water.  Patient informed of potential adverse effects including but not limited to nausea, diarrhea, dizziness, itching, and swelling of the extremities or lymph nodes.  The patient verbalized understanding of the proper use and possible adverse effects of ivermectin.  All of the patient's questions and concerns were addressed.
Rituxan Pregnancy And Lactation Text: This medication is Pregnancy Category C and it isn't know if it is safe during pregnancy. It is unknown if this medication is excreted in breast milk but similar antibodies are known to be excreted.
Topical Clindamycin Pregnancy And Lactation Text: This medication is Pregnancy Category B and is considered safe during pregnancy. It is unknown if it is excreted in breast milk.
Benzoyl Peroxide Pregnancy And Lactation Text: This medication is Pregnancy Category C. It is unknown if benzoyl peroxide is excreted in breast milk.
Opzelura Pregnancy And Lactation Text: There is insufficient data to evaluate drug-associated risk for major birth defects, miscarriage, or other adverse maternal or fetal outcomes.  There is a pregnancy registry that monitors pregnancy outcomes in pregnant persons exposed to the medication during pregnancy.  It is unknown if this medication is excreted in breast milk.  Do not breastfeed during treatment and for about 4 weeks after the last dose.
Cibinqo Counseling: I discussed with the patient the risks of Cibinqo therapy including but not limited to common cold, nausea, headache, cold sores, increased blood CPK levels, dizziness, UTIs, fatigue, acne, and vomitting. Live vaccines should be avoided.  This medication has been linked to serious infections; higher rate of mortality; malignancy and lymphoproliferative disorders; major adverse cardiovascular events; thrombosis; thrombocytopenia and lymphopenia; lipid elevations; and retinal detachment.
Doxycycline Pregnancy And Lactation Text: This medication is Pregnancy Category D and not consider safe during pregnancy. It is also excreted in breast milk but is considered safe for shorter treatment courses.
Propranolol Counseling:  I discussed with the patient the risks of propranolol including but not limited to low heart rate, low blood pressure, low blood sugar, restlessness and increased cold sensitivity. They should call the office if they experience any of these side effects.
Cyclophosphamide Counseling:  I discussed with the patient the risks of cyclophosphamide including but not limited to hair loss, hormonal abnormalities, decreased fertility, abdominal pain, diarrhea, nausea and vomiting, bone marrow suppression and infection. The patient understands that monitoring is required while taking this medication.
Erythromycin Counseling:  I discussed with the patient the risks of erythromycin including but not limited to GI upset, allergic reaction, drug rash, diarrhea, increase in liver enzymes, and yeast infections.
Siliq Counseling:  I discussed with the patient the risks of Siliq including but not limited to new or worsening depression, suicidal thoughts and behavior, immunosuppression, malignancy, posterior leukoencephalopathy syndrome, and serious infections.  The patient understands that monitoring is required including a PPD at baseline and must alert us or the primary physician if symptoms of infection or other concerning signs are noted. There is also a special program designed to monitor depression which is required with Siliq.
Topical Ketoconazole Counseling: Patient counseled that this medication may cause skin irritation or allergic reactions.  In the event of skin irritation, the patient was advised to reduce the amount of the drug applied or use it less frequently.   The patient verbalized understanding of the proper use and possible adverse effects of ketoconazole.  All of the patient's questions and concerns were addressed.
Carac Counseling:  I discussed with the patient the risks of Carac including but not limited to erythema, scaling, itching, weeping, crusting, and pain.
Cibinqo Pregnancy And Lactation Text: It is unknown if this medication will adversely affect pregnancy or breast feeding.  You should not take this medication if you are currently pregnant or planning a pregnancy or while breastfeeding.
Picato Counseling:  I discussed with the patient the risks of Picato including but not limited to erythema, scaling, itching, weeping, crusting, and pain.
Dupixent Counseling: I discussed with the patient the risks of dupilumab including but not limited to eye infection and irritation, cold sores, injection site reactions, worsening of asthma, allergic reactions and increased risk of parasitic infection.  Live vaccines should be avoided while taking dupilumab. Dupilumab will also interact with certain medications such as warfarin and cyclosporine. The patient understands that monitoring is required and they must alert us or the primary physician if symptoms of infection or other concerning signs are noted.
Bactrim Pregnancy And Lactation Text: This medication is Pregnancy Category D and is known to cause fetal risk.  It is also excreted in breast milk.
Hydroxyzine Counseling: Patient advised that the medication is sedating and not to drive a car after taking this medication.  Patient informed of potential adverse effects including but not limited to dry mouth, urinary retention, and blurry vision.  The patient verbalized understanding of the proper use and possible adverse effects of hydroxyzine.  All of the patient's questions and concerns were addressed.
Soolantra Counseling: I discussed with the patients the risks of topial Soolantra. This is a medicine which decreases the number of mites and inflammation in the skin. You experience burning, stinging, eye irritation or allergic reactions.  Please call our office if you develop any problems from using this medication.
Finasteride Female Counseling: Finasteride Counseling:  I discussed with the patient the risks of use of finasteride including but not limited to decreased libido and sexual dysfunction. Explained the teratogenic nature of the medication and stressed the importance of not getting pregnant during treatment. All of the patient's questions and concerns were addressed.
Ilumya Counseling: I discussed with the patient the risks of tildrakizumab including but not limited to immunosuppression, malignancy, posterior leukoencephalopathy syndrome, and serious infections.  The patient understands that monitoring is required including a PPD at baseline and must alert us or the primary physician if symptoms of infection or other concerning signs are noted.
Xelpankajz Pregnancy And Lactation Text: This medication is Pregnancy Category D and is not considered safe during pregnancy.  The risk during breast feeding is also uncertain.
Gabapentin Counseling: I discussed with the patient the risks of gabapentin including but not limited to dizziness, somnolence, fatigue and ataxia.
Sarecycline Counseling: Patient advised regarding possible photosensitivity and discoloration of the teeth, skin, lips, tongue and gums.  Patient instructed to avoid sunlight, if possible.  When exposed to sunlight, patients should wear protective clothing, sunglasses, and sunscreen.  The patient was instructed to call the office immediately if the following severe adverse effects occur:  hearing changes, easy bruising/bleeding, severe headache, or vision changes.  The patient verbalized understanding of the proper use and possible adverse effects of sarecycline.  All of the patient's questions and concerns were addressed.
Libtayo Counseling- I discussed with the patient the risks of Libtayo including but not limited to nausea, vomiting, diarrhea, and bone or muscle pain.  The patient verbalized understanding of the proper use and possible adverse effects of Libtayo.  All of the patient's questions and concerns were addressed.
Olanzapine Counseling- I discussed with the patient the common side effects of olanzapine including but are not limited to: lack of energy, dry mouth, increased appetite, sleepiness, tremor, constipation, dizziness, changes in behavior, or restlessness.  Explained that teenagers are more likely to experience headaches, abdominal pain, pain in the arms or legs, tiredness, and sleepiness.  Serious side effects include but are not limited: increased risk of death in elderly patients who are confused, have memory loss, or dementia-related psychosis; hyperglycemia; increased cholesterol and triglycerides; and weight gain.
Adbry Counseling: I discussed with the patient the risks of tralokinumab including but not limited to eye infection and irritation, cold sores, injection site reactions, worsening of asthma, allergic reactions and increased risk of parasitic infection.  Live vaccines should be avoided while taking tralokinumab. The patient understands that monitoring is required and they must alert us or the primary physician if symptoms of infection or other concerning signs are noted.
Klisyri Counseling:  I discussed with the patient the risks of Klisyri including but not limited to erythema, scaling, itching, weeping, crusting, and pain.
Winlevi Pregnancy And Lactation Text: This medication is considered safe during pregnancy and breastfeeding.
Olanzapine Pregnancy And Lactation Text: This medication is pregnancy category C.   There are no adequate and well controlled trials with olanzapine in pregnant females.  Olanzapine should be used during pregnancy only if the potential benefit justifies the potential risk to the fetus.   In a study in lactating healthy women, olanzapine was excreted in breast milk.  It is recommended that women taking olanzapine should not breast feed.
Cephalexin Counseling: I counseled the patient regarding use of cephalexin as an antibiotic for prophylactic and/or therapeutic purposes. Cephalexin (commonly prescribed under brand name Keflex) is a cephalosporin antibiotic which is active against numerous classes of bacteria, including most skin bacteria. Side effects may include nausea, diarrhea, gastrointestinal upset, rash, hives, yeast infections, and in rare cases, hepatitis, kidney disease, seizures, fever, confusion, neurologic symptoms, and others. Patients with severe allergies to penicillin medications are cautioned that there is about a 10% incidence of cross-reactivity with cephalosporins. When possible, patients with penicillin allergies should use alternatives to cephalosporins for antibiotic therapy.
Hydroxyzine Pregnancy And Lactation Text: This medication is not safe during pregnancy and should not be taken. It is also excreted in breast milk and breast feeding isn't recommended.
Klisyri Pregnancy And Lactation Text: It is unknown if this medication can harm a developing fetus or if it is excreted in breast milk.
VTAMA Counseling: I discussed with the patient that VTAMA is not for use in the eyes, mouth or mouth. They should call the office if they develop any signs of allergic reactions to VTAMA. The patient verbalized understanding of the proper use and possible adverse effects of VTAMA.  All of the patient's questions and concerns were addressed.
Soolantra Pregnancy And Lactation Text: This medication is Pregnancy Category C. This medication is considered safe during breast feeding.
Ketoconazole Counseling:   Patient counseled regarding improving absorption with orange juice.  Adverse effects include but are not limited to breast enlargement, headache, diarrhea, nausea, upset stomach, liver function test abnormalities, taste disturbance, and stomach pain.  There is a rare possibility of liver failure that can occur when taking ketoconazole. The patient understands that monitoring of LFTs may be required, especially at baseline. The patient verbalized understanding of the proper use and possible adverse effects of ketoconazole.  All of the patient's questions and concerns were addressed.
Finasteride Pregnancy And Lactation Text: This medication is absolutely contraindicated during pregnancy. It is unknown if it is excreted in breast milk.
Infliximab Counseling:  I discussed with the patient the risks of infliximab including but not limited to myelosuppression, immunosuppression, autoimmune hepatitis, demyelinating diseases, lymphoma, and serious infections.  The patient understands that monitoring is required including a PPD at baseline and must alert us or the primary physician if symptoms of infection or other concerning signs are noted.
Birth Control Pills Counseling: Birth Control Pill Counseling: I discussed with the patient the potential side effects of OCPs including but not limited to increased risk of stroke, heart attack, thrombophlebitis, deep venous thrombosis, hepatic adenomas, breast changes, GI upset, headaches, and depression.  The patient verbalized understanding of the proper use and possible adverse effects of OCPs. All of the patient's questions and concerns were addressed.
Tetracycline Counseling: Patient counseled regarding possible photosensitivity and increased risk for sunburn.  Patient instructed to avoid sunlight, if possible.  When exposed to sunlight, patients should wear protective clothing, sunglasses, and sunscreen.  The patient was instructed to call the office immediately if the following severe adverse effects occur:  hearing changes, easy bruising/bleeding, severe headache, or vision changes.  The patient verbalized understanding of the proper use and possible adverse effects of tetracycline.  All of the patient's questions and concerns were addressed. Patient understands to avoid pregnancy while on therapy due to potential birth defects.
Glycopyrrolate Counseling:  I discussed with the patient the risks of glycopyrrolate including but not limited to skin rash, drowsiness, dry mouth, difficulty urinating, and blurred vision.
Topical Retinoid counseling:  Patient advised to apply a pea-sized amount only at bedtime and wait 30 minutes after washing their face before applying.  If too drying, patient may add a non-comedogenic moisturizer. The patient verbalized understanding of the proper use and possible adverse effects of retinoids.  All of the patient's questions and concerns were addressed.
Libtayo Pregnancy And Lactation Text: This medication is contraindicated in pregnancy and when breast feeding.
Adbry Pregnancy And Lactation Text: It is unknown if this medication will adversely affect pregnancy or breast feeding.
Taltz Counseling: I discussed with the patient the risks of ixekizumab including but not limited to immunosuppression, serious infections, worsening of inflammatory bowel disease and drug reactions.  The patient understands that monitoring is required including a PPD at baseline and must alert us or the primary physician if symptoms of infection or other concerning signs are noted.
Bimzelx Counseling:  I discussed with the patient the risks of Bimzelx including but not limited to depression, immunosuppression, allergic reactions and infections.  The patient understands that monitoring is required including a PPD at baseline and must alert us or the primary physician if symptoms of infection or other concerning signs are noted.
Aklief counseling:  Patient advised to apply a pea-sized amount only at bedtime and wait 30 minutes after washing their face before applying.  If too drying, patient may add a non-comedogenic moisturizer.  The most commonly reported side effects including irritation, redness, scaling, dryness, stinging, burning, itching, and increased risk of sunburn.  The patient verbalized understanding of the proper use and possible adverse effects of retinoids.  All of the patient's questions and concerns were addressed.
Ketoconazole Pregnancy And Lactation Text: This medication is Pregnancy Category C and it isn't know if it is safe during pregnancy. It is also excreted in breast milk and breast feeding isn't recommended.
Minoxidil Counseling: Minoxidil is a topical medication which can increase blood flow where it is applied. It is uncertain how this medication increases hair growth. Side effects are uncommon and include stinging and allergic reactions.
Oral Minoxidil Counseling- I discussed with the patient the risks of oral minoxidil including but not limited to shortness of breath, swelling of the feet or ankles, dizziness, lightheadedness, unwanted hair growth and allergic reaction.  The patient verbalized understanding of the proper use and possible adverse effects of oral minoxidil.  All of the patient's questions and concerns were addressed.
Cephalexin Pregnancy And Lactation Text: This medication is Pregnancy Category B and considered safe during pregnancy.  It is also excreted in breast milk but can be used safely for shorter doses.
Terbinafine Counseling: Patient counseling regarding adverse effects of terbinafine including but not limited to headache, diarrhea, rash, upset stomach, liver function test abnormalities, itching, taste/smell disturbance, nausea, abdominal pain, and flatulence.  There is a rare possibility of liver failure that can occur when taking terbinafine.  The patient understands that a baseline LFT and kidney function test may be required. The patient verbalized understanding of the proper use and possible adverse effects of terbinafine.  All of the patient's questions and concerns were addressed.
Clindamycin Counseling: I counseled the patient regarding use of clindamycin as an antibiotic for prophylactic and/or therapeutic purposes. Clindamycin is active against numerous classes of bacteria, including skin bacteria. Side effects may include nausea, diarrhea, gastrointestinal upset, rash, hives, yeast infections, and in rare cases, colitis.
Odomzo Counseling- I discussed with the patient the risks of Odomzo including but not limited to nausea, vomiting, diarrhea, constipation, weight loss, changes in the sense of taste, decreased appetite, muscle spasms, and hair loss.  The patient verbalized understanding of the proper use and possible adverse effects of Odomzo.  All of the patient's questions and concerns were addressed.
Birth Control Pills Pregnancy And Lactation Text: This medication should be avoided if pregnant and for the first 30 days post-partum.
Aklief Pregnancy And Lactation Text: It is unknown if this medication is safe to use during pregnancy.  It is unknown if this medication is excreted in breast milk.  Breastfeeding women should use the topical cream on the smallest area of the skin for the shortest time needed while breastfeeding.  Do not apply to nipple and areola.
Tremfya Counseling: I discussed with the patient the risks of guselkumab including but not limited to immunosuppression, serious infections, and drug reactions.  The patient understands that monitoring is required including a PPD at baseline and must alert us or the primary physician if symptoms of infection or other concerning signs are noted.
Bimzelx Pregnancy And Lactation Text: This medication crosses the placenta and the safety is uncertain during pregnancy. It is unknown if this medication is present in breast milk.
Zoryve Counseling:  I discussed with the patient that Zoryve is not for use in the eyes, mouth or vagina. The most commonly reported side effects include diarrhea, headache, insomnia, application site pain, upper respiratory tract infections, and urinary tract infections.  All of the patient's questions and concerns were addressed.
Oral Minoxidil Pregnancy And Lactation Text: This medication should only be used when clearly needed if you are pregnant, attempting to become pregnant or breast feeding.
Otezla Counseling: The side effects of Otezla were discussed with the patient, including but not limited to worsening or new depression, weight loss, diarrhea, nausea, upper respiratory tract infection, and headache. Patient instructed to call the office should any adverse effect occur.  The patient verbalized understanding of the proper use and possible adverse effects of Otezla.  All the patient's questions and concerns were addressed.
Clindamycin Pregnancy And Lactation Text: This medication can be used in pregnancy if certain situations. Clindamycin is also present in breast milk.
Azathioprine Counseling:  I discussed with the patient the risks of azathioprine including but not limited to myelosuppression, immunosuppression, hepatotoxicity, lymphoma, and infections.  The patient understands that monitoring is required including baseline LFTs, Creatinine, possible TPMP genotyping and weekly CBCs for the first month and then every 2 weeks thereafter.  The patient verbalized understanding of the proper use and possible adverse effects of azathioprine.  All of the patient's questions and concerns were addressed.
Cantharidin Pregnancy And Lactation Text: This medication has not been proven safe during pregnancy. It is unknown if this medication is excreted in breast milk.
Nemluvio Counseling: I discussed with the patient the risks of nemolizumab including but not limited to headache, gastrointestinal complaints, nasopharyngitis, musculoskeletal complaints, injection site reactions, and allergic reactions. The patient understands that monitoring is required and they must alert us or the primary physician if any side effects are noted.
Tazorac Counseling:  Patient advised that medication is irritating and drying.  Patient may need to apply sparingly and wash off after an hour before eventually leaving it on overnight.  The patient verbalized understanding of the proper use and possible adverse effects of tazorac.  All of the patient's questions and concerns were addressed.

## 2024-12-31 ENCOUNTER — APPOINTMENT (OUTPATIENT)
Dept: MEDICAL GROUP | Facility: LAB | Age: 49
End: 2024-12-31
Payer: COMMERCIAL

## 2025-01-10 ENCOUNTER — APPOINTMENT (OUTPATIENT)
Dept: MEDICAL GROUP | Facility: PHYSICIAN GROUP | Age: 50
End: 2025-01-10
Payer: COMMERCIAL

## 2025-01-10 ENCOUNTER — OFFICE VISIT (OUTPATIENT)
Dept: URGENT CARE | Facility: CLINIC | Age: 50
End: 2025-01-10
Payer: COMMERCIAL

## 2025-01-10 VITALS
OXYGEN SATURATION: 100 % | TEMPERATURE: 97.5 F | HEART RATE: 96 BPM | HEIGHT: 64 IN | BODY MASS INDEX: 30.9 KG/M2 | DIASTOLIC BLOOD PRESSURE: 80 MMHG | WEIGHT: 181 LBS | RESPIRATION RATE: 18 BRPM | SYSTOLIC BLOOD PRESSURE: 120 MMHG

## 2025-01-10 DIAGNOSIS — H69.92 EUSTACHIAN TUBE DYSFUNCTION, LEFT: ICD-10-CM

## 2025-01-10 DIAGNOSIS — J01.00 ACUTE MAXILLARY SINUSITIS, RECURRENCE NOT SPECIFIED: ICD-10-CM

## 2025-01-10 PROCEDURE — 3074F SYST BP LT 130 MM HG: CPT

## 2025-01-10 PROCEDURE — 3079F DIAST BP 80-89 MM HG: CPT

## 2025-01-10 PROCEDURE — 99213 OFFICE O/P EST LOW 20 MIN: CPT

## 2025-01-10 RX ORDER — FLUTICASONE PROPIONATE 50 MCG
1 SPRAY, SUSPENSION (ML) NASAL DAILY
Qty: 16 G | Refills: 0 | Status: SHIPPED | OUTPATIENT
Start: 2025-01-10

## 2025-01-10 ASSESSMENT — ENCOUNTER SYMPTOMS
ABDOMINAL PAIN: 0
SHORTNESS OF BREATH: 0
NAUSEA: 0
ORTHOPNEA: 0
WHEEZING: 0
PALPITATIONS: 0
DIARRHEA: 0
HEMOPTYSIS: 0
VOMITING: 0

## 2025-01-10 ASSESSMENT — FIBROSIS 4 INDEX: FIB4 SCORE: 0.9

## 2025-01-10 NOTE — PROGRESS NOTES
"Subjective:   Patito Briscoe is a 49 y.o. female who presents for Cough (Cough, sore throat, bilateral ear pain, fatigue, sinus drainage x2wks, )    HPI: This is a very pleasant patient presenting with prolonged symptoms of productive cough, sore throat, bilateral ear fullness and pain (L>R), sinus pressure, congestion x2 weeks. Subjective fevers/chills two nights ago. Denies SOB, +hx of exercise induced asthma - she feels this is fairly well-controlled at this time.     Review of Systems   HENT:  Positive for congestion and ear pain. Negative for ear discharge and hearing loss.    Respiratory:  Negative for hemoptysis, shortness of breath and wheezing.    Cardiovascular:  Negative for chest pain, palpitations and orthopnea.   Gastrointestinal:  Negative for abdominal pain, diarrhea, nausea and vomiting.   Skin:  Negative for rash.   All other systems reviewed and are negative.      Medications:    albuterol Nebu  [facility] CALCIUM-600/VIT D PO  estradiol  fluticasone-salmeterol Aepb  losartan Tabs  omeprazole  traZODone Tabs  VITAMIN D PO  zaleplon    Allergies: Tape    Problem List: Patito Briscoe does not have any pertinent problems on file.      Past Social Hx: Patito Briscoe  reports that she has never smoked. She has never used smokeless tobacco. She reports current alcohol use of about 3.6 oz of alcohol per week. She reports current drug use. Drug: Oral.     Past Family Hx:  Patito Briscoe family history includes Diabetes in her maternal grandfather, mother, and paternal uncle; Heart Disease in her father; Hypertension in her brother, father, and paternal grandmother; Stroke in her father.     Problem list, medications, and allergies reviewed by myself today in Epic.     Objective:     /80 (BP Location: Left arm, Patient Position: Sitting, BP Cuff Size: Adult)   Pulse 96   Temp 36.4 °C (97.5 °F) (Temporal)   Resp 18   Ht 1.626 m (5' 4\")   Wt 82.1 kg (181 lb)   SpO2 100%   BMI 31.07 " kg/m²     Physical Exam  Vitals reviewed.   Constitutional:       Appearance: Normal appearance.   HENT:      Head: Normocephalic and atraumatic.      Right Ear: Ear canal and external ear normal.      Left Ear: Ear canal and external ear normal.      Ears:      Comments: Slightly bulging left TM     Nose: Congestion present.      Right Sinus: Maxillary sinus tenderness present.      Left Sinus: Maxillary sinus tenderness present.      Mouth/Throat:      Mouth: Mucous membranes are moist.   Eyes:      Conjunctiva/sclera: Conjunctivae normal.   Cardiovascular:      Rate and Rhythm: Normal rate.      Heart sounds: Normal heart sounds.   Pulmonary:      Effort: Pulmonary effort is normal.      Breath sounds: Normal breath sounds.   Skin:     General: Skin is warm and dry.      Capillary Refill: Capillary refill takes less than 2 seconds.   Neurological:      Mental Status: She is alert and oriented to person, place, and time.       Assessment/Plan:     Diagnosis and associated orders:     1. Acute maxillary sinusitis, recurrence not specified  amoxicillin-clavulanate (AUGMENTIN) 875-125 MG Tab    fluticasone (FLONASE) 50 MCG/ACT nasal spray      2. Eustachian tube dysfunction, left  fluticasone (FLONASE) 50 MCG/ACT nasal spray         Comments/MDM:     Suspect sinusitis. We've discussed possible etiologies of acute sinusitis, including allergens, environmental irritants, and/or infection (viruses majority), bacteria and fungi. Recommend adjunct therapy such as saline irrigation and/or Flonase. Reasonable side affects and potential adverse effects of antibiotics discussed. Discussed typical length and natural progression. Instructed to return to clinic or nearest emergency department for any change in condition, further concerns, or worsening symptoms.       Differential diagnosis, natural history, supportive care, and indications for immediate follow-up discussed. Advised the patient to follow-up with PCP for recheck,  reevaluation, and consideration of further management.    Deances CARINA Rob, APRN, FNP-BC

## 2025-01-13 NOTE — HOSPITAL COURSE
"Ms. Briscoe is a 46 y.o. female with a PMHx of HTN who presented 8/17/2021 with chest pain and epigastric pain.  Patient reports that she was awoken last night with right upper quadrant pain that radiated to her mid abdomen and up to her chest.  No alleviating or exacerbating factor were noted.  Patient reports that it felt like it was \"ripping her apart \".  Patient reports that she has not had this type of pain in the past.  She denies any nausea, no vomiting.  She denies any history of GERD, no GI issues in the past.  Patient denies any fever, no chills, no sick contact.  Patient denies smoking, drinks alcohol occasionally, and denies any illicit drug use.     In ER, patient found to be hypertensive which is mostly induced with pain.  Blood work is within normal limits.  Initial troponin is at 10, we will trend troponin levels.  CTA of the thorax was obtained which noted no evidence of aortic aneurysm or dissection, but noted nonobstructing left nephrolithiasis.  US RUQ was also obtained which noted cholelithiasis without evidence of cholecystitis.  HIDA scan was also ordered.  I have personally reviewed initial EKG which noted a sinus rhythm with borderline T wave abnormalities in inferior leads.  CXR noted no cardiopulmonary process.  Patient will be admitted into the observation unit for ACS rule out and possible surgical intervention.  Dr. Mcconnell was also consulted for possible cholecystectomy.  " Asha,   Pt was seen today in office.   She follows with Nivia and Richie. Plan is for further evaluation of her mitral valve now that her CAD is fixed. I spoke to richie today. Can you send referral to [facility] and call pt to notify of plan.   Thx.

## 2025-01-27 DIAGNOSIS — I10 ESSENTIAL HYPERTENSION: ICD-10-CM

## 2025-01-28 RX ORDER — LOSARTAN POTASSIUM 100 MG/1
100 TABLET ORAL DAILY
Qty: 90 TABLET | Refills: 3 | Status: SHIPPED | OUTPATIENT
Start: 2025-01-28

## 2025-02-17 SDOH — ECONOMIC STABILITY: INCOME INSECURITY: IN THE LAST 12 MONTHS, WAS THERE A TIME WHEN YOU WERE NOT ABLE TO PAY THE MORTGAGE OR RENT ON TIME?: PATIENT DECLINED

## 2025-02-17 SDOH — ECONOMIC STABILITY: TRANSPORTATION INSECURITY
IN THE PAST 12 MONTHS, HAS LACK OF TRANSPORTATION KEPT YOU FROM MEETINGS, WORK, OR FROM GETTING THINGS NEEDED FOR DAILY LIVING?: PATIENT DECLINED

## 2025-02-17 SDOH — ECONOMIC STABILITY: TRANSPORTATION INSECURITY
IN THE PAST 12 MONTHS, HAS THE LACK OF TRANSPORTATION KEPT YOU FROM MEDICAL APPOINTMENTS OR FROM GETTING MEDICATIONS?: PATIENT DECLINED

## 2025-02-17 SDOH — ECONOMIC STABILITY: INCOME INSECURITY: HOW HARD IS IT FOR YOU TO PAY FOR THE VERY BASICS LIKE FOOD, HOUSING, MEDICAL CARE, AND HEATING?: PATIENT DECLINED

## 2025-02-17 SDOH — HEALTH STABILITY: PHYSICAL HEALTH: ON AVERAGE, HOW MANY DAYS PER WEEK DO YOU ENGAGE IN MODERATE TO STRENUOUS EXERCISE (LIKE A BRISK WALK)?: 4 DAYS

## 2025-02-17 SDOH — ECONOMIC STABILITY: FOOD INSECURITY: WITHIN THE PAST 12 MONTHS, YOU WORRIED THAT YOUR FOOD WOULD RUN OUT BEFORE YOU GOT MONEY TO BUY MORE.: PATIENT DECLINED

## 2025-02-17 SDOH — HEALTH STABILITY: PHYSICAL HEALTH: ON AVERAGE, HOW MANY MINUTES DO YOU ENGAGE IN EXERCISE AT THIS LEVEL?: 50 MIN

## 2025-02-17 SDOH — ECONOMIC STABILITY: FOOD INSECURITY: WITHIN THE PAST 12 MONTHS, THE FOOD YOU BOUGHT JUST DIDN'T LAST AND YOU DIDN'T HAVE MONEY TO GET MORE.: PATIENT DECLINED

## 2025-02-17 SDOH — HEALTH STABILITY: MENTAL HEALTH
STRESS IS WHEN SOMEONE FEELS TENSE, NERVOUS, ANXIOUS, OR CAN'T SLEEP AT NIGHT BECAUSE THEIR MIND IS TROUBLED. HOW STRESSED ARE YOU?: VERY MUCH

## 2025-02-17 SDOH — ECONOMIC STABILITY: HOUSING INSECURITY
IN THE LAST 12 MONTHS, WAS THERE A TIME WHEN YOU DID NOT HAVE A STEADY PLACE TO SLEEP OR SLEPT IN A SHELTER (INCLUDING NOW)?: PATIENT DECLINED

## 2025-02-17 SDOH — ECONOMIC STABILITY: TRANSPORTATION INSECURITY
IN THE PAST 12 MONTHS, HAS LACK OF RELIABLE TRANSPORTATION KEPT YOU FROM MEDICAL APPOINTMENTS, MEETINGS, WORK OR FROM GETTING THINGS NEEDED FOR DAILY LIVING?: PATIENT DECLINED

## 2025-02-17 ASSESSMENT — SOCIAL DETERMINANTS OF HEALTH (SDOH)
IN A TYPICAL WEEK, HOW MANY TIMES DO YOU TALK ON THE PHONE WITH FAMILY, FRIENDS, OR NEIGHBORS?: PATIENT DECLINED
HOW MANY DRINKS CONTAINING ALCOHOL DO YOU HAVE ON A TYPICAL DAY WHEN YOU ARE DRINKING: 1 OR 2
HOW OFTEN DO YOU ATTENT MEETINGS OF THE CLUB OR ORGANIZATION YOU BELONG TO?: PATIENT DECLINED
HOW HARD IS IT FOR YOU TO PAY FOR THE VERY BASICS LIKE FOOD, HOUSING, MEDICAL CARE, AND HEATING?: PATIENT DECLINED
HOW OFTEN DO YOU ATTENT MEETINGS OF THE CLUB OR ORGANIZATION YOU BELONG TO?: PATIENT DECLINED
HOW OFTEN DO YOU HAVE A DRINK CONTAINING ALCOHOL: 2-4 TIMES A MONTH
HOW OFTEN DO YOU GET TOGETHER WITH FRIENDS OR RELATIVES?: PATIENT DECLINED
HOW OFTEN DO YOU ATTEND CHURCH OR RELIGIOUS SERVICES?: PATIENT DECLINED
DO YOU BELONG TO ANY CLUBS OR ORGANIZATIONS SUCH AS CHURCH GROUPS UNIONS, FRATERNAL OR ATHLETIC GROUPS, OR SCHOOL GROUPS?: PATIENT DECLINED
IN THE PAST 12 MONTHS, HAS THE ELECTRIC, GAS, OIL, OR WATER COMPANY THREATENED TO SHUT OFF SERVICE IN YOUR HOME?: PATIENT DECLINED
HOW OFTEN DO YOU HAVE SIX OR MORE DRINKS ON ONE OCCASION: NEVER
IN A TYPICAL WEEK, HOW MANY TIMES DO YOU TALK ON THE PHONE WITH FAMILY, FRIENDS, OR NEIGHBORS?: PATIENT DECLINED
HOW OFTEN DO YOU ATTEND CHURCH OR RELIGIOUS SERVICES?: PATIENT DECLINED
DO YOU BELONG TO ANY CLUBS OR ORGANIZATIONS SUCH AS CHURCH GROUPS UNIONS, FRATERNAL OR ATHLETIC GROUPS, OR SCHOOL GROUPS?: PATIENT DECLINED
WITHIN THE PAST 12 MONTHS, YOU WORRIED THAT YOUR FOOD WOULD RUN OUT BEFORE YOU GOT THE MONEY TO BUY MORE: PATIENT DECLINED
HOW OFTEN DO YOU GET TOGETHER WITH FRIENDS OR RELATIVES?: PATIENT DECLINED

## 2025-02-17 ASSESSMENT — LIFESTYLE VARIABLES
SKIP TO QUESTIONS 9-10: 1
HOW OFTEN DO YOU HAVE A DRINK CONTAINING ALCOHOL: 2-4 TIMES A MONTH
AUDIT-C TOTAL SCORE: 2
HOW MANY STANDARD DRINKS CONTAINING ALCOHOL DO YOU HAVE ON A TYPICAL DAY: 1 OR 2
HOW OFTEN DO YOU HAVE SIX OR MORE DRINKS ON ONE OCCASION: NEVER

## 2025-02-18 ENCOUNTER — HOSPITAL ENCOUNTER (OUTPATIENT)
Facility: MEDICAL CENTER | Age: 50
End: 2025-02-18
Attending: NURSE PRACTITIONER
Payer: COMMERCIAL

## 2025-02-18 ENCOUNTER — OFFICE VISIT (OUTPATIENT)
Dept: MEDICAL GROUP | Facility: LAB | Age: 50
End: 2025-02-18
Payer: COMMERCIAL

## 2025-02-18 VITALS
OXYGEN SATURATION: 95 % | HEIGHT: 64 IN | RESPIRATION RATE: 16 BRPM | SYSTOLIC BLOOD PRESSURE: 120 MMHG | DIASTOLIC BLOOD PRESSURE: 76 MMHG | WEIGHT: 194 LBS | BODY MASS INDEX: 33.12 KG/M2 | HEART RATE: 90 BPM | TEMPERATURE: 96.4 F

## 2025-02-18 DIAGNOSIS — N95.1 MENOPAUSAL SYNDROME: ICD-10-CM

## 2025-02-18 DIAGNOSIS — Z23 NEED FOR HEPATITIS B VACCINATION: ICD-10-CM

## 2025-02-18 DIAGNOSIS — Z79.890 HORMONE REPLACEMENT THERAPY (HRT): ICD-10-CM

## 2025-02-18 DIAGNOSIS — Z00.00 PREVENTATIVE HEALTH CARE: ICD-10-CM

## 2025-02-18 DIAGNOSIS — Z12.4 SCREENING FOR MALIGNANT NEOPLASM OF CERVIX: ICD-10-CM

## 2025-02-18 DIAGNOSIS — Z01.419 ENCOUNTER FOR GYNECOLOGICAL EXAMINATION: ICD-10-CM

## 2025-02-18 PROCEDURE — 88142 CYTOPATH C/V THIN LAYER: CPT

## 2025-02-18 PROCEDURE — 90471 IMMUNIZATION ADMIN: CPT | Performed by: NURSE PRACTITIONER

## 2025-02-18 PROCEDURE — 3074F SYST BP LT 130 MM HG: CPT | Performed by: NURSE PRACTITIONER

## 2025-02-18 PROCEDURE — 3078F DIAST BP <80 MM HG: CPT | Performed by: NURSE PRACTITIONER

## 2025-02-18 PROCEDURE — 99396 PREV VISIT EST AGE 40-64: CPT | Mod: 25 | Performed by: NURSE PRACTITIONER

## 2025-02-18 PROCEDURE — 90746 HEPB VACCINE 3 DOSE ADULT IM: CPT | Performed by: NURSE PRACTITIONER

## 2025-02-18 RX ORDER — BUSPIRONE HYDROCHLORIDE 10 MG/1
TABLET ORAL
COMMUNITY

## 2025-02-18 RX ORDER — ZOLPIDEM TARTRATE 10 MG/1
TABLET ORAL
COMMUNITY
Start: 2025-02-11

## 2025-02-18 RX ORDER — ESTRADIOL 0.03 MG/D
1 FILM, EXTENDED RELEASE TRANSDERMAL
Qty: 24 PATCH | Refills: 3 | Status: SHIPPED | OUTPATIENT
Start: 2025-02-18

## 2025-02-18 RX ORDER — PROGESTERONE 100 MG/1
100 CAPSULE ORAL DAILY
Qty: 90 CAPSULE | Refills: 3 | Status: SHIPPED | OUTPATIENT
Start: 2025-02-18

## 2025-02-18 RX ORDER — PHENTERMINE HYDROCHLORIDE 37.5 MG/1
37.5 TABLET ORAL
Qty: 90 TABLET | Refills: 1 | Status: SHIPPED | OUTPATIENT
Start: 2025-02-18 | End: 2025-03-20

## 2025-02-18 ASSESSMENT — FIBROSIS 4 INDEX: FIB4 SCORE: 0.9

## 2025-02-18 ASSESSMENT — PATIENT HEALTH QUESTIONNAIRE - PHQ9: CLINICAL INTERPRETATION OF PHQ2 SCORE: 0

## 2025-02-18 NOTE — PATIENT INSTRUCTIONS
Call insurance and ask if GLP-1 agonist is covered for weight loss.  If so, what BMI do they require and do they require any other diagnosis?   Ask which one:  zepbound, wegovy.      [facility] in [place] is less expensive than [facility].
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
## 2025-02-19 DIAGNOSIS — Z12.4 SCREENING FOR MALIGNANT NEOPLASM OF CERVIX: ICD-10-CM

## 2025-02-26 ENCOUNTER — RESULTS FOLLOW-UP (OUTPATIENT)
Dept: MEDICAL GROUP | Facility: LAB | Age: 50
End: 2025-02-26

## 2025-02-26 LAB — THINPREP PAP, CYTOLOGY NL11781: NORMAL

## 2025-04-17 ENCOUNTER — HOSPITAL ENCOUNTER (OUTPATIENT)
Dept: RADIOLOGY | Facility: MEDICAL CENTER | Age: 50
End: 2025-04-17
Attending: NURSE PRACTITIONER
Payer: COMMERCIAL

## 2025-04-17 DIAGNOSIS — Z12.31 VISIT FOR SCREENING MAMMOGRAM: ICD-10-CM

## 2025-04-17 PROCEDURE — 77067 SCR MAMMO BI INCL CAD: CPT

## 2025-05-07 ENCOUNTER — HOSPITAL ENCOUNTER (OUTPATIENT)
Dept: RADIOLOGY | Facility: MEDICAL CENTER | Age: 50
End: 2025-05-07
Payer: COMMERCIAL

## 2025-05-08 ENCOUNTER — RESULTS FOLLOW-UP (OUTPATIENT)
Dept: MEDICAL GROUP | Facility: LAB | Age: 50
End: 2025-05-08

## 2025-06-03 ENCOUNTER — TELEMEDICINE (OUTPATIENT)
Dept: MEDICAL GROUP | Facility: LAB | Age: 50
End: 2025-06-03
Payer: COMMERCIAL

## 2025-06-03 ENCOUNTER — TELEPHONE (OUTPATIENT)
Dept: MEDICAL GROUP | Facility: LAB | Age: 50
End: 2025-06-03

## 2025-06-03 VITALS — BODY MASS INDEX: 34.55 KG/M2 | WEIGHT: 195 LBS | HEIGHT: 63 IN

## 2025-06-03 DIAGNOSIS — E78.5 HYPERLIPIDEMIA, UNSPECIFIED HYPERLIPIDEMIA TYPE: ICD-10-CM

## 2025-06-03 DIAGNOSIS — I10 ESSENTIAL HYPERTENSION: ICD-10-CM

## 2025-06-03 DIAGNOSIS — Z83.3 FAMILY HISTORY OF DIABETES MELLITUS: ICD-10-CM

## 2025-06-03 DIAGNOSIS — E28.2 PCOS (POLYCYSTIC OVARIAN SYNDROME): ICD-10-CM

## 2025-06-03 RX ORDER — TIRZEPATIDE 2.5 MG/.5ML
2.5 INJECTION, SOLUTION SUBCUTANEOUS
Qty: 2 ML | Refills: 1 | Status: SHIPPED | OUTPATIENT
Start: 2025-06-03

## 2025-06-03 ASSESSMENT — FIBROSIS 4 INDEX: FIB4 SCORE: 0.9

## 2025-06-03 NOTE — PROGRESS NOTES
Virtual Visit: Established Patient   This visit was conducted via Teams using secure and encrypted videoconferencing technology.   The patient was in their home in the [facility].    The patient's identity was confirmed and verbal consent was obtained for this virtual visit.    Subjective:   Verbal consent was acquired by the patient to use [email or web address] ambient listening note generation during this visit Yes     History of Present Illness  The patient is a 49-year-old established female who presents via virtual visit to discuss weight management.    She has been struggling with her weight chronically and would like to start a GLP-1 agonist. She has tried various diets, including keto, Weight Watchers, self speech, Myers, Whole30, paleo, and intermittent fasting. She has also tracked her food intake via [employer]. Despite these efforts, she has not achieved significant weight loss. She has tried phentermine for several years without success. She also tried hormones but gained weight. Her current weight is 195 pounds, and she is 5 feet 3 inches tall. She aims to lose approximately 50 pounds.     She has elevated cholesterol and high blood pressure.    She has a history of polycystic ovary syndrome (PCOS).    She reports no history of thyroid cancer or pancreatitis.    FAMILY HISTORY  Both her mother and father have type II diabetes and high blood pressure.        Current medicines (including changes today)  Current Medications[1]    Patient Active Problem List    Diagnosis Date Noted    PCOS (polycystic ovarian syndrome) 06/03/2025    Family history of diabetes mellitus - mom and dad 06/03/2025    Hyperlipidemia 06/03/2025    Family history of hypertension 09/20/2022    Essential hypertension 08/17/2021    Finger mass, left 06/03/2020    Sleep disturbance - melatonin - bad dreams; sonata  - wakes up 3 am; tolerating trazodone / ambien through psy as of 2/2025. 07/08/2019    Generalized headaches 02/06/2017     "Multiple joint pain 04/25/2016    Other fatigue 04/25/2016    Diarrhea 04/25/2016    Gastroesophageal reflux disease without esophagitis 04/24/2016    Exercise-induced asthma 03/03/2013        Objective:   Ht 1.6 m (5' 3\")   Wt 88.5 kg (195 lb)   BMI 34.54 kg/m²     Physical Exam:  Gen: NAD  Resp: nonlabored.  Able to speak in full sentences  Psy: pleasant / cooperative.   Neuro:  Alert and oriented x 3      Assessment and Plan:   The following treatment plan was discussed:     1. BMI 34.0-34.9,adult  - Tirzepatide-Weight Management (ZEPBOUND) 2.5 MG/0.5ML Solution Auto-injector; Inject 2.5 mg under the skin every 7 days.  Dispense: 2 mL; Refill: 1    2. Essential hypertension  - Tirzepatide-Weight Management (ZEPBOUND) 2.5 MG/0.5ML Solution Auto-injector; Inject 2.5 mg under the skin every 7 days.  Dispense: 2 mL; Refill: 1    3. PCOS (polycystic ovarian syndrome)  - Tirzepatide-Weight Management (ZEPBOUND) 2.5 MG/0.5ML Solution Auto-injector; Inject 2.5 mg under the skin every 7 days.  Dispense: 2 mL; Refill: 1    4. Family history of diabetes mellitus  - Tirzepatide-Weight Management (ZEPBOUND) 2.5 MG/0.5ML Solution Auto-injector; Inject 2.5 mg under the skin every 7 days.  Dispense: 2 mL; Refill: 1    5. Hyperlipidemia, unspecified hyperlipidemia type  - Tirzepatide-Weight Management (ZEPBOUND) 2.5 MG/0.5ML Solution Auto-injector; Inject 2.5 mg under the skin every 7 days.  Dispense: 2 mL; Refill: 1    Patito would greatly benefit from initiation of tirzepatide weekly to decrease her risk of developing type 2 diabetes, coronary artery disease, kidney disease and sleep apnea which can lead to atrial fibrillation and congestive heart failure.  She has put forth very valiant efforts for years at exercise and diet as well as trying prescriptions such as phentermine for weight loss, unsuccessfully.  BP well controlled.   Encouraged updated labs to assess lipids.   Consider CT calcium scoring by age 50.  Follow-up: " No follow-ups on file.              [1]   Current Outpatient Medications   Medication Sig Dispense Refill    Tirzepatide-Weight Management (ZEPBOUND) 2.5 MG/0.5ML Solution Auto-injector Inject 2.5 mg under the skin every 7 days. 2 mL 1    busPIRone (BUSPAR) 10 MG Tab tablet 60      zolpidem (AMBIEN) 10 MG Tab TAKE 1/2 (ONE-HALF) TO 1TABLET BY MOUTH ONCE DAILY AT BEDTIME AS NEEDED FOR INSOMNIA      losartan (COZAAR) 100 MG Tab TAKE 1 TABLET DAILY 90 Tablet 3    fluticasone (FLONASE) 50 MCG/ACT nasal spray Administer 1 Spray into affected nostril(S) every day. 16 g 0    traZODone (DESYREL) 50 MG Tab TAKE 1 TABLET DAILY AS NEEDED FOR SLEEP 90 Tablet 3    fluticasone-salmeterol (ADVAIR) 250-50 MCG/ACT AEROSOL POWDER, BREATH ACTIVATED Inhale 1 Puff every 12 hours. 60 Each 5    VITAMIN D PO Take 1 Tablet by mouth every day.      albuterol (PROVENTIL) 2.5mg/3ml Nebu Soln solution for nebulization Take 3 mL by nebulization every four hours as needed for Shortness of Breath. 300 mL 0    Calcium-Vitamin D (CVS CALCIUM-600/VIT D PO) Take 1 Each by mouth every day.      omeprazole (PRILOSEC) 20 MG delayed-release capsule TAKE ONE CAPSULE BY MOUTH ONCE DAILY 30 Cap 0     No current facility-administered medications for this visit.

## 2025-06-03 NOTE — TELEPHONE ENCOUNTER
Patito Briscoe (Key: [id number])  Rx #: [id number]  Zepbound 2.5MG/0.5ML pen-injectors  Form  [employer] Commercial Electronic PA Form (2017 NCPDP)

## 2025-07-02 ENCOUNTER — PATIENT MESSAGE (OUTPATIENT)
Dept: MEDICAL GROUP | Facility: LAB | Age: 50
End: 2025-07-02
Payer: COMMERCIAL

## 2025-07-02 RX ORDER — TIRZEPATIDE 5 MG/.5ML
5 INJECTION, SOLUTION SUBCUTANEOUS
Qty: 2 ML | Refills: 2 | Status: CANCELLED | OUTPATIENT
Start: 2025-07-02

## 2025-07-02 RX ORDER — TIRZEPATIDE 5 MG/.5ML
5 INJECTION, SOLUTION SUBCUTANEOUS
Qty: 2 ML | Refills: 2 | Status: SHIPPED | OUTPATIENT
Start: 2025-07-02 | End: 2025-07-29

## 2025-07-09 NOTE — ASSESSMENT & PLAN NOTE
-Patient counseled  -Patient would benefit from an outpatient weight loss program   Patient : Yuliana Willingham Age: 57 year old Sex: female   MRN: [id number] Encounter Date: 7/9/2025      History     Chief Complaint   Patient presents with    Fall       Fall  Pertinent negatives include no fever, no nausea, no hematuria and no headaches.     This is a 57-year-old female arrives the emergency room after mechanical trip and fall which occurred on Monday.  Patient went to urgent care had 2 sutures done to the head at that time.  Patient states she was told to go back to work.  Patient is now complaining of neck discomfort and nausea and \"feeling out of\".  Patient was told that she may have a concussion.  Patient also notes that she has bruising to her left eye.  Patient has no other complaints at this time.    Patient again states this is mechanical trip and fall.      Allergies   Allergen Reactions    Lamictal RASH    Ziprasidone Other (See Comments) and VISUAL DISTURBANCE     double vision         Current Discharge Medication List        Prior to Admission Medications    Details   Vraylar 1.5 MG capsule [None received]      clobetasol (TEMOVATE) 0.05 % ointment [None received]      metFORMIN (GLUCOPHAGE-XR) 500 MG 24 hr tablet [None received]      ondansetron (ZOFRAN ODT) 4 MG disintegrating tablet Place 1 tablet onto the tongue every 8 hours as needed for Nausea.  Qty: 20 tablet, Refills: 0      predniSONE (DELTASONE) 50 MG tablet Take 1 tablet by mouth every morning.  Qty: 5 tablet, Refills: 0      gabapentin (NEURONTIN) 100 MG capsule Take 1 pill po qhs  Qty: 90 capsule, Refills: 0      venlafaxine XR (EFFEXOR XR) 37.5 MG 24 hr capsule [None received]      semaglutide-Weight Management (Wegovy) 1.7 MG/0.75ML injection Inject 1.7 mg into the skin every 7 days. Indications: HLD w/ BMI 28 0.25 mg weekly for 4 weeks, then 0.5 mg weekly for 4 weeks, then 1 mg weekly for 4 weeks, then 1.7 mg weekly for 4 weeks, then 2.4 mg weekly thereafter  Qty: 3 mL, Refills: 0      buPROPion (WELLBUTRIN SR) 100 MG 12  hr tablet [None received]      hydroCORTisone (CORTIZONE) 2.5 % ointment       venlafaxine XR (EFFEXOR XR) 150 MG 24 hr capsule [None received]      rosuvastatin (CRESTOR) 40 MG tablet Take 1 tablet by mouth daily.  Qty: 90 tablet, Refills: 3      testosterone 50 MG/5GM (1%) gel APPLY 1 PACKET TOPICALLY TO THE AFFECTED AREA DAILY AS DIRECTED      Progesterone 100 MG Cap [None received]      Symbicort 80-4.5 MCG/ACT inhaler Inhale 2 puffs into the lungs as directed.      estradiol (ESTRACE) 0.1 MG/GM vaginal cream Apply pea-size amount to vaginal opening at bedtime two times a week.  Qty: 42.5 g, Refills: 1      propRANolol (INDERAL) 20 MG tablet       minoxidil (LONITEN) 2.5 MG tablet       1 g compounded hormone cream Place 1 suppository vaginally at bedtime. Testosterone and estrogen      Spacer/Aero-Holding Chambers Device To use with MDI 2 puffs every 4-6 hours as needed  Qty: 1 each, Refills: 0      albuterol 108 (90 Base) MCG/ACT inhaler Inhale 2 puffs into the lungs every 4 hours as needed for Shortness of Breath or Wheezing.  Qty: 1 Inhaler, Refills: 5      QUEtiapine (SEROQUEL) 25 MG tablet       divalproex (DEPAKOTE ER) 500 MG 24 hr ER tablet Take 1,000 mg by mouth at bedtime.  Refills: 0      traZODone (DESYREL) 50 MG tablet Take by mouth at bedtime.             Past Medical History:   Diagnosis Date    Allergy     Lamictal and Geodon    Alopecia     Anxiety disorder     Asthma (CMD)     Bipolar 1 disorder  (CMD)     Bipolar disorder, current episode depressed, moderate  (CMD) 01/14/2019    Chronic back pain     Hyperlipidemia        Past Surgical History:   Procedure Laterality Date    ANKLE SURGERY Left     COSMETIC SURGERY  1999    Liposuction    EYE SURGERY      LIPOSUCTION      NO PAST SURGERIES  2010    Ankle broken surgery to fix       Family History   Problem Relation Age of Onset    Rheumatologic Disease Mother         Chronic Fatigue     Osteoporosis Mother     Other Mother         Diffuse large  cell B lymphoma    Coronary Artery Disease Father     Myocardial Infarction Father 81    Cancer, Skin Father     Hyperlipidemia Father     Hypertension Father     Cancer, Skin Sister     Diabetes Maternal Grandmother     Stroke Maternal Grandmother     Arthritis Paternal Aunt     Cancer Paternal Aunt     High cholesterol Paternal Aunt     Cancer Paternal Grandmother     Cancer Paternal Grandfather     Depression Father     Heart disease Father     High cholesterol Father     Psychiatric Father         Mood disorder and personality disorder    Vision Loss Father     Diabetes Maternal Grandmother     Psychiatric Sister         Personality disorder    Stroke Maternal Grandfather     Asthma Mother     Cancer Mother     Stroke Father        Social History     Tobacco Use    Smoking status: Former     Current packs/day: 0.00     Average packs/day: 0.5 packs/day for 33.0 years (16.5 ttl pk-yrs)     Types: Cigarettes     Start date:      Quit date:      Years since quittin.5    Smokeless tobacco: Never   Vaping Use    Vaping status: never used   Substance Use Topics    Alcohol use: Yes     Alcohol/week: 1.0 - 3.0 standard drink of alcohol     Types: 1 - 3 Standard drinks or equivalent per week     Comment: OCCASIONAL    Drug use: Not Currently     Types: Marijuana     Comment: gummy       Review of Systems   Constitutional:  Negative for activity change, chills, fatigue and fever.   HENT:  Negative for ear pain, sinus pain, sneezing and sore throat.    Respiratory:  Negative for cough, shortness of breath and wheezing.    Cardiovascular:  Negative for chest pain, palpitations and leg swelling.   Gastrointestinal:  Negative for constipation, diarrhea and nausea.   Genitourinary:  Negative for dysuria, flank pain and hematuria.   Musculoskeletal:  Negative for myalgias and neck pain.   Skin:  Negative for pallor, rash and wound.   Neurological:  Negative for light-headedness and headaches.       Physical Exam      ED Triage Vitals [07/09/25 1542]   ED Triage Vitals Group      Temp 97.6 °F (36.4 °C)      Heart Rate (!) 102      Resp 20      /69      SpO2 96 %      EtCO2 mmHg       Height       Weight 180 lb 8.9 oz (81.9 kg)      Weight Scale Used Standing scale      BMI (Calculated)       IBW/kg (Calculated)        Physical Exam  Constitutional:       Appearance: She is not ill-appearing or diaphoretic.   HENT:      Right Ear: Tympanic membrane and ear canal normal.      Left Ear: Tympanic membrane and ear canal normal.      Nose: Nose normal.      Mouth/Throat:      Mouth: Mucous membranes are moist.   Eyes:      Pupils: Pupils are equal, round, and reactive to light.      Comments: No entrapment able to move eyes in all direction pain-free   Neck:      Comments: No focal tenderness to palpation  Cardiovascular:      Rate and Rhythm: Normal rate and regular rhythm.      Heart sounds: No murmur heard.  Pulmonary:      Effort: Pulmonary effort is normal. No respiratory distress.      Breath sounds: Normal breath sounds.   Abdominal:      General: There is no distension.      Palpations: Abdomen is soft. There is no mass.      Tenderness: There is no abdominal tenderness.   Musculoskeletal:         General: No swelling or tenderness.   Skin:     Coloration: Skin is not pale.   Neurological:      Mental Status: She is alert.      Comments: Normal finger-nose normal motion normal gait with ambulation.         ED Course     Procedures    Lab Results     Results for orders placed or performed during the hospital encounter of 07/09/25   Comprehensive Metabolic Panel    Specimen: Blood, Venous   Result Value Ref Range    Fasting Status      Sodium 139 135 - 145 mmol/L    Potassium 4.3 3.4 - 5.1 mmol/L    Chloride 102 97 - 110 mmol/L    Carbon Dioxide 29 21 - 32 mmol/L    Anion Gap 12 7 - 19 mmol/L    Glucose 93 70 - 99 mg/dL    BUN 15 6 - 20 mg/dL    Creatinine 0.73 0.51 - 0.95 mg/dL    Glomerular Filtration Rate >90 >=60     BUN/Cr 21 7 - 25    Calcium 8.8 8.4 - 10.2 mg/dL    Bilirubin, Total 0.3 0.2 - 1.0 mg/dL    GOT/AST 24 <=37 Units/L    GPT/ALT 30 <64 Units/L    Alkaline Phosphatase 66 45 - 117 Units/L    Albumin 3.7 3.4 - 5.0 g/dL    Protein, Total 7.3 6.4 - 8.2 g/dL    Globulin 3.6 2.0 - 4.0 g/dL    A/G Ratio 1.0 1.0 - 2.4   TROPONIN I, HIGH SENSITIVITY    Specimen: Blood, Venous   Result Value Ref Range    Troponin I, High Sensitivity <4 <52 ng/L   CBC with Automated Differential (performable only)    Specimen: Blood, Venous   Result Value Ref Range    WBC 8.5 4.2 - 11.0 K/mcL    RBC 4.41 4.00 - 5.20 mil/mcL    HGB 14.2 12.0 - 15.5 g/dL    HCT 39.8 36.0 - 46.5 %    MCV 90.2 78.0 - 100.0 fl    MCH 32.2 26.0 - 34.0 pg    MCHC 35.7 32.0 - 36.5 g/dL    RDW-CV 11.6 11.0 - 15.0 %    RDW-SD 38.5 (L) 39.0 - 50.0 fL     140 - 450 K/mcL    NRBC 0 <=0 /100 WBC    Neutrophil, Percent 56 %    Lymphocytes, Percent 28 %    Mono, Percent 11 %    Eosinophils, Percent 3 %    Basophils, Percent 1 %    Immature Granulocytes 1 %    Absolute Neutrophils 4.9 1.8 - 7.7 K/mcL    Absolute Lymphocytes 2.4 1.0 - 4.0 K/mcL    Absolute Monocytes 0.9 0.3 - 0.9 K/mcL    Absolute Eosinophils  0.2 0.0 - 0.5 K/mcL    Absolute Basophils 0.1 0.0 - 0.3 K/mcL    Absolute Immature Granulocytes 0.1 0.0 - 0.2 K/mcL       EKG Results         Radiology Results     Imaging Results              CT HEAD WO CONTRAST (Final result)  Result time 07/09/25 18:37:39      Final result                   Impression:    1.   No acute intracranial abnormality.  2.   No acute osseous abnormality of the facial bones.  3.   Small anterior frontal region scalp contusion.    Dictated by: BRETT FLOREZ MD  Dictated on: 7/9/2025 5:54 PM       I, GISELE LEMON M.D., have reviewed the images and report and concur with  these findings interpreted by BRETT FLOREZ MD.    Electronically Signed by: GISELE LEMON M.D.   Signed on: 7/9/2025 6:37 PM   Workstation ID:  [id number]               Narrative:    DATE OF EXAM: 7/9/2025 5:40 PM    EXAMINATION: CT HEAD WO CONTRAST, CT FACIAL BONES WO CONTRAST    HISTORY: Fall, head injury 2 days ago, nausea, lightheadedness, superficial  soft tissue injury    COMPARISON: None available.    TECHNIQUE:  Non-enhanced CT of the head and facial bones was performed.   Dose reduction techniques were utilized.    CT HEAD:  FINDINGS:  Parenchyma: No acute intraparenchymal hemorrhage. No abnormal parenchymal  hypodensity.  Ventricles: Ventricles and sulci are within normal limits for patient's  age.  Extra-axial space: No extra-axial fluid collection or hemorrhage.  Calvarium: No acute osseous abnormality.  Other: Small anterior frontal region scalp contusion.    CT Facial:  FINDINGS:  Orbits: Globes are intact. Osseous orbital walls within normal limits. Pre  and postseptal soft tissues are normal.  Paranasal sinuses: Paranasal sinus and mastoid air cells are clear.  Nasal bone: No acute osseous abnormality.  Maxilla: No acute osseous abnormality.  Mandible: No acute osseous abnormality.  Zygomatic arch: No acute osseous abnormality.  Other:  No significant abnormality.                        Preliminary result                   Impression:    1.   No acute intracranial abnormality.  2.   No acute osseous abnormalities.  3.   Small left frontal scalp contusion.          Dictated by: BRETT FLOREZ MD  Dictated on: 7/9/2025 5:54 PM       * * * * * * * * * * * * * * PRELIMINARY REPORT * * * * * * * * * * * * * *                    Narrative:        * * * * * * * * * * * * * * PRELIMINARY REPORT * * * * * * * * * * * * * *     DATE OF EXAM: 7/9/2025 5:40 PM    EXAMINATION: CT HEAD WO CONTRAST, CT FACIAL BONES WO CONTRAST    HISTORY: Fall, head injury 2 days ago, nausea, lightheadedness, superficial  soft tissue injury    COMPARISON: None available.    TECHNIQUE:  Non-enhanced CT of the head and facial bones was performed.   Dose reduction  techniques were utilized.    CT HEAD:  FINDINGS:  Parenchyma: No acute intraparenchymal hemorrhage. No abnormal parenchymal  hypodensity.  Ventricles: Ventricles and sulci are within normal limits for patient's  age.  Extra-axial space: No extra-axial fluid collection or hemorrhage.  Calvarium: No acute osseous abnormality.  Other: Small left frontal scalp contusion.    CT Facial:  FINDINGS:  Orbits: Globes are intact. Osseous orbital walls within normal limits. Pre  and postseptal soft tissues are normal.  Paranasal sinuses: Paranasal sinus and mastoid air cells are clear.  Nasal bone: No acute osseous abnormality.  Maxilla: No acute osseous abnormality.  Mandible: No acute osseous abnormality.  Zygomatic arch: No acute osseous abnormality.  Other:  No significant abnormality.                                       CT CERVICAL SPINE WO CONTRAST (Final result)  Result time 07/09/25 18:00:23      Final result                   Impression:    Normal cervical spine, specifically without evidence of  fracture or alignment abnormality. Normal prevertebral soft tissues . Disc  spaces and vertebral bodies well-preserved in height without evidence of  compression deformity. Left-sided C3-C4 facet arthropathy. Slight  straightening of normal cervical lordosis.    Electronically Signed by: CONNOR RASMUSSEN M.D.   Signed on: 7/9/2025 6:00 PM   Workstation ID: [id number]               Narrative:    EXAM:   CT CERVICAL SPINE WO CONTRAST    CLINICAL INDICATION: Status post fall. Pain    COMPARISON:  No previous exams available for review.    TECHNIQUE: Automated exposure control employed as radiation dose reduction  strategy on this patient.  Multi slice technique utilized with sagittal and  coronal reconstructions.                                       CT FACIAL BONES WO CONTRAST (Final result)  Result time 07/09/25 18:37:39      Final result                   Impression:    1.   No acute intracranial abnormality.  2.   No  acute osseous abnormality of the facial bones.  3.   Small anterior frontal region scalp contusion.    Dictated by: BRETT FLOREZ MD  Dictated on: 7/9/2025 5:54 PM       I, GISELE LEMON M.D., have reviewed the images and report and concur with  these findings interpreted by BRETT FLOREZ MD.    Electronically Signed by: GISELE LEMON M.D.   Signed on: 7/9/2025 6:37 PM   Workstation ID: [id number]               Narrative:    DATE OF EXAM: 7/9/2025 5:40 PM    EXAMINATION: CT HEAD WO CONTRAST, CT FACIAL BONES WO CONTRAST    HISTORY: Fall, head injury 2 days ago, nausea, lightheadedness, superficial  soft tissue injury    COMPARISON: None available.    TECHNIQUE:  Non-enhanced CT of the head and facial bones was performed.   Dose reduction techniques were utilized.    CT HEAD:  FINDINGS:  Parenchyma: No acute intraparenchymal hemorrhage. No abnormal parenchymal  hypodensity.  Ventricles: Ventricles and sulci are within normal limits for patient's  age.  Extra-axial space: No extra-axial fluid collection or hemorrhage.  Calvarium: No acute osseous abnormality.  Other: Small anterior frontal region scalp contusion.    CT Facial:  FINDINGS:  Orbits: Globes are intact. Osseous orbital walls within normal limits. Pre  and postseptal soft tissues are normal.  Paranasal sinuses: Paranasal sinus and mastoid air cells are clear.  Nasal bone: No acute osseous abnormality.  Maxilla: No acute osseous abnormality.  Mandible: No acute osseous abnormality.  Zygomatic arch: No acute osseous abnormality.  Other:  No significant abnormality.                        Preliminary result                   Impression:    1.   No acute intracranial abnormality.  2.   No acute osseous abnormalities.  3.   Small left frontal scalp contusion.          Dictated by: BRETT FLOREZ MD  Dictated on: 7/9/2025 5:54 PM       * * * * * * * * * * * * * * PRELIMINARY REPORT * * * * * * * * * * * * * *                     Narrative:        * * * * * * * * * * * * * * PRELIMINARY REPORT * * * * * * * * * * * * * *     DATE OF EXAM: 7/9/2025 5:40 PM    EXAMINATION: CT HEAD WO CONTRAST, CT FACIAL BONES WO CONTRAST    HISTORY: Fall, head injury 2 days ago, nausea, lightheadedness, superficial  soft tissue injury    COMPARISON: None available.    TECHNIQUE:  Non-enhanced CT of the head and facial bones was performed.   Dose reduction techniques were utilized.    CT HEAD:  FINDINGS:  Parenchyma: No acute intraparenchymal hemorrhage. No abnormal parenchymal  hypodensity.  Ventricles: Ventricles and sulci are within normal limits for patient's  age.  Extra-axial space: No extra-axial fluid collection or hemorrhage.  Calvarium: No acute osseous abnormality.  Other: Small left frontal scalp contusion.    CT Facial:  FINDINGS:  Orbits: Globes are intact. Osseous orbital walls within normal limits. Pre  and postseptal soft tissues are normal.  Paranasal sinuses: Paranasal sinus and mastoid air cells are clear.  Nasal bone: No acute osseous abnormality.  Maxilla: No acute osseous abnormality.  Mandible: No acute osseous abnormality.  Zygomatic arch: No acute osseous abnormality.  Other:  No significant abnormality.                                       XR CHEST PA AND LATERAL 2 VIEWS (Final result)  Result time 07/09/25 16:27:34      Final result                   Impression:      Normal heart and lungs. No change in comparison with previous 2/9/2023.    Electronically Signed by: CONNOR RASMUSSEN M.D.   Signed on: 7/9/2025 4:27 PM   Workstation ID: [id number]               Narrative:    EXAM: XR CHEST PA AND LATERAL 2 VIEWS    CLINICAL INDICATION: Shortness of breath.    COMPARISON: 2/9/2023                                      ED Medication Orders (From admission, onward)      Ordered Start     Status Ordering Provider    07/09/25 1643 07/09/25 1644  acetaminophen (TYLENOL) tablet 650 mg  ONCE         Last MAR action: Given PAOLA CORTEZ             ED Course as of 07/09/25 1852 Wed Jul 09, 2025   1849 CT scan of the cervical spine is negative for acute findings. [RB]   1849 CT facial bones no acute [RB]   1849  osseous abnormality noted there is a small anterior frontal region of a scalp contusion which was noted.  Patient also has no intracranial abnormalities on CT head.  Patient given copy of report.  Patient is neurologically intact will be discharged home. [RB]   1850 Chest x-ray is unremarkable.  Patient will be discharged home at this time. [RB]      ED Course User Index  [RB] Sandra Early PA-C       Medical Decision Making  MDM 57-year-old female arrives emergency after mechanical trip and fall is complaining of neck discomfort and headache did have sutures placed patient came to the ED today for reevaluation.  Patient will have CTs done will then be reassessed.  Patient had x-ray done in triage room which was negative no chest pain no shortness of breath no suspect ACS again states this was a mechanical trip and fall.        Clinical Impression     ED Diagnosis   1. Fall, initial encounter        2. Injury of head, initial encounter        3. Neck pain            Disposition        Discharge 7/9/2025  6:52 PM  Yuliana Willingham discharge to home/self care.      Disposition        Discharge 7/9/2025  6:52 PM  Yuliana Willingham discharge to home/self care.           Sandra Early PA-C  07/09/25 1852

## 2025-07-29 RX ORDER — TIRZEPATIDE 7.5 MG/.5ML
7.5 INJECTION, SOLUTION SUBCUTANEOUS
Qty: 2 ML | Refills: 2 | Status: SHIPPED | OUTPATIENT
Start: 2025-07-29

## 2025-08-05 RX ORDER — PHENTERMINE HYDROCHLORIDE 37.5 MG/1
37.5 TABLET ORAL
Qty: 90 TABLET | Refills: 0 | Status: SHIPPED | OUTPATIENT
Start: 2025-08-05 | End: 2025-11-03

## (undated) DEVICE — NEPTUNE 4 PORT MANIFOLD - (20/PK)

## (undated) DEVICE — CANNULA W/SEAL 5X100 Z-THRE - ADED KII (12/BX)

## (undated) DEVICE — TUBE, CULTURE AEROBIC

## (undated) DEVICE — LACTATED RINGERS INJ 1000 ML - (14EA/CA 60CA/PF)

## (undated) DEVICE — CHLORAPREP 26 ML APPLICATOR - ORANGE TINT(25/CA)

## (undated) DEVICE — BLADE SURGICAL #15 - (50/BX 3BX/CA)

## (undated) DEVICE — TUBING CLEARLINK DUO-VENT - C-FLO (48EA/CA)

## (undated) DEVICE — CANISTER SUCTION 3000ML MECHANICAL FILTER AUTO SHUTOFF MEDI-VAC NONSTERILE LF DISP  (40EA/CA)

## (undated) DEVICE — SUTURE 0 VICRYL PLUS UR-6 - 27 INCH (36/BX)

## (undated) DEVICE — SUTURE GENERAL

## (undated) DEVICE — SET LEADWIRE 5 LEAD BEDSIDE DISPOSABLE ECG (1SET OF 5/EA)

## (undated) DEVICE — SET EXTENSION WITH 2 PORTS (48EA/CA) ***PART #2C8610 IS A SUBSTITUTE*****

## (undated) DEVICE — GLOVE BIOGEL SZ 8 SURGICAL PF LTX - (50PR/BX 4BX/CA)

## (undated) DEVICE — TROCAR Z THREAD11MM OPTICAL - NON BLADED(6/BX)

## (undated) DEVICE — TROCAR 5X100 NON BLADED Z-TH - READ KII (6/BX)

## (undated) DEVICE — KIT ANESTHESIA W/CIRCUIT & 3/LT BAG W/FILTER (20EA/CA)

## (undated) DEVICE — SET TUBING PNEUMOCLEAR HIGH FLOW SMOKE EVACUATION (10EA/BX)

## (undated) DEVICE — SWAB ANAEROBIC SPEC.COLLECTOR - (25/PK 4PK/CA 100EA/CA)

## (undated) DEVICE — BAG RETRIEVAL 10ML (10EA/BX)

## (undated) DEVICE — ELECTRODE DUAL RETURN W/ CORD - (50/PK)

## (undated) DEVICE — SODIUM CHL IRRIGATION 0.9% 1000ML (12EA/CA)

## (undated) DEVICE — GAUZE TUBE 5/8 IN X 50 YDS. - (TUBE GAUZE)

## (undated) DEVICE — HEAD HOLDER JUNIOR/ADULT

## (undated) DEVICE — SUTURE 4-0 ETHILON PS-2 18 (12PK/BX)"

## (undated) DEVICE — TUBE CONNECT SUCTION CLEAR 120 X 1/4" (50EA/CA)"

## (undated) DEVICE — GOWN WARMING STANDARD FLEX - (30/CA)

## (undated) DEVICE — MASK ANESTHESIA ADULT  - (100/CA)

## (undated) DEVICE — PACK LOWER EXTREMITY - (2/CA)

## (undated) DEVICE — GLOVE, LITE (PAIR)

## (undated) DEVICE — SUTURE 4-0 CHROMIC FS-2 (36EA/BX)

## (undated) DEVICE — DRAPE LARGE 3 QUARTER - (20/CA)

## (undated) DEVICE — SENSOR SPO2 NEO LNCS ADHESIVE (20/BX) SEE USER NOTES

## (undated) DEVICE — APPLIER ENDO MEDIUM CLIP (3EA/BX)

## (undated) DEVICE — PACK UPPER EXTREMITY SM OR - (3/CA)

## (undated) DEVICE — SET SUCTION/IRRIGATION WITH DISPOSABLE TIP (6/CA )PART #0250-070-520 IS A SUB

## (undated) DEVICE — DERMABOND ADVANCED - (12EA/BX)

## (undated) DEVICE — GLOVE BIOGEL INDICATOR SZ 8 SURGICAL PF LTX - (50/BX 4BX/CA)

## (undated) DEVICE — GLOVE BIOGEL SZ 8.5 SURGICAL PF LTX - (50PR/BX 4BX/CA)

## (undated) DEVICE — ELECTRODE 850 FOAM ADHESIVE - HYDROGEL RADIOTRNSPRNT (50/PK)

## (undated) DEVICE — SUTURE 4-0 MONOCRYL PLUS PS-2 - 27 INCH (36/BX)

## (undated) DEVICE — PROTECTOR ULNA NERVE - (36PR/CA)

## (undated) DEVICE — SLEEVE, VASO, THIGH, MED

## (undated) DEVICE — TROCAR LAPSCP 100MM 12MM NTHRD - (6/BX)

## (undated) DEVICE — TOWEL STOP TIMEOUT SAFETY FLAG (40EA/CA)

## (undated) DEVICE — GLOVE BIOGEL INDICATOR SZ 8.5 SURGICAL PF LTX - (50/BX 4BX/CA)

## (undated) DEVICE — PACK LAP CHOLE OR - (2EA/CA)

## (undated) DEVICE — SUCTION INSTRUMENT YANKAUER BULBOUS TIP W/O VENT (50EA/CA)

## (undated) DEVICE — PAD PREP 24 X 48 CUFFED - (100/CA)

## (undated) DEVICE — BLADE BEAVER 6400 MINI EYE ROUND TIP SHARP ON ONE SIDE (20/CA)